# Patient Record
Sex: FEMALE | Race: WHITE | NOT HISPANIC OR LATINO
[De-identification: names, ages, dates, MRNs, and addresses within clinical notes are randomized per-mention and may not be internally consistent; named-entity substitution may affect disease eponyms.]

---

## 2017-07-12 ENCOUNTER — APPOINTMENT (OUTPATIENT)
Dept: CARDIOLOGY | Facility: CLINIC | Age: 52
End: 2017-07-12

## 2017-07-12 VITALS
BODY MASS INDEX: 47.09 KG/M2 | DIASTOLIC BLOOD PRESSURE: 84 MMHG | HEIGHT: 66 IN | HEART RATE: 74 BPM | SYSTOLIC BLOOD PRESSURE: 146 MMHG | OXYGEN SATURATION: 96 % | WEIGHT: 293 LBS

## 2017-07-12 DIAGNOSIS — F17.200 NICOTINE DEPENDENCE, UNSPECIFIED, UNCOMPLICATED: ICD-10-CM

## 2017-07-12 DIAGNOSIS — Z87.898 PERSONAL HISTORY OF OTHER SPECIFIED CONDITIONS: ICD-10-CM

## 2017-08-04 ENCOUNTER — APPOINTMENT (OUTPATIENT)
Dept: CARDIOLOGY | Facility: CLINIC | Age: 52
End: 2017-08-04
Payer: COMMERCIAL

## 2017-08-04 PROCEDURE — A9502: CPT

## 2017-08-04 PROCEDURE — 78452 HT MUSCLE IMAGE SPECT MULT: CPT

## 2017-08-04 PROCEDURE — 93015 CV STRESS TEST SUPVJ I&R: CPT

## 2017-08-04 PROCEDURE — 93306 TTE W/DOPPLER COMPLETE: CPT

## 2017-08-09 ENCOUNTER — APPOINTMENT (OUTPATIENT)
Dept: CARDIOLOGY | Facility: CLINIC | Age: 52
End: 2017-08-09
Payer: COMMERCIAL

## 2017-08-09 VITALS
OXYGEN SATURATION: 97 % | DIASTOLIC BLOOD PRESSURE: 88 MMHG | HEART RATE: 80 BPM | HEIGHT: 66 IN | SYSTOLIC BLOOD PRESSURE: 140 MMHG | BODY MASS INDEX: 47.09 KG/M2 | WEIGHT: 293 LBS

## 2017-08-09 PROCEDURE — 99214 OFFICE O/P EST MOD 30 MIN: CPT

## 2017-08-21 ENCOUNTER — APPOINTMENT (OUTPATIENT)
Dept: ULTRASOUND IMAGING | Facility: CLINIC | Age: 52
End: 2017-08-21

## 2017-08-21 ENCOUNTER — OUTPATIENT (OUTPATIENT)
Dept: OUTPATIENT SERVICES | Facility: HOSPITAL | Age: 52
LOS: 1 days | End: 2017-08-21
Payer: COMMERCIAL

## 2017-08-21 DIAGNOSIS — Z00.8 ENCOUNTER FOR OTHER GENERAL EXAMINATION: ICD-10-CM

## 2017-08-21 PROCEDURE — 76700 US EXAM ABDOM COMPLETE: CPT

## 2017-08-22 PROCEDURE — 76700 US EXAM ABDOM COMPLETE: CPT | Mod: 26

## 2017-09-09 ENCOUNTER — APPOINTMENT (OUTPATIENT)
Dept: ORTHOPEDIC SURGERY | Facility: CLINIC | Age: 52
End: 2017-09-09
Payer: COMMERCIAL

## 2017-09-09 VITALS
WEIGHT: 293 LBS | BODY MASS INDEX: 48.82 KG/M2 | HEIGHT: 65 IN | SYSTOLIC BLOOD PRESSURE: 122 MMHG | DIASTOLIC BLOOD PRESSURE: 76 MMHG | HEART RATE: 82 BPM

## 2017-09-09 DIAGNOSIS — Z87.39 PERSONAL HISTORY OF OTHER DISEASES OF THE MUSCULOSKELETAL SYSTEM AND CONNECTIVE TISSUE: ICD-10-CM

## 2017-09-09 DIAGNOSIS — M51.26 OTHER INTERVERTEBRAL DISC DISPLACEMENT, LUMBAR REGION: ICD-10-CM

## 2017-09-09 DIAGNOSIS — Z86.69 PERSONAL HISTORY OF OTHER DISEASES OF THE NERVOUS SYSTEM AND SENSE ORGANS: ICD-10-CM

## 2017-09-09 PROCEDURE — 73502 X-RAY EXAM HIP UNI 2-3 VIEWS: CPT

## 2017-09-09 PROCEDURE — 99203 OFFICE O/P NEW LOW 30 MIN: CPT

## 2017-09-09 RX ORDER — DICLOFENAC SODIUM 75 MG/1
75 TABLET, DELAYED RELEASE ORAL
Qty: 60 | Refills: 0 | Status: DISCONTINUED | COMMUNITY
Start: 2017-04-05 | End: 2017-09-09

## 2017-09-09 RX ORDER — HYDROCODONE BITARTRATE AND ACETAMINOPHEN 5; 300 MG/1; MG/1
5-300 TABLET ORAL
Qty: 15 | Refills: 0 | Status: DISCONTINUED | COMMUNITY
Start: 2017-07-06 | End: 2017-09-09

## 2017-09-09 RX ORDER — DIAZEPAM 5 MG/1
5 TABLET ORAL
Qty: 1 | Refills: 0 | Status: DISCONTINUED | COMMUNITY
Start: 2017-05-23 | End: 2017-09-09

## 2017-09-09 RX ORDER — TRAMADOL HYDROCHLORIDE 50 MG/1
50 TABLET, COATED ORAL
Qty: 60 | Refills: 0 | Status: DISCONTINUED | COMMUNITY
Start: 2017-03-06 | End: 2017-09-09

## 2017-09-20 PROBLEM — Z00.00 ENCOUNTER FOR PREVENTIVE HEALTH EXAMINATION: Noted: 2017-09-20

## 2017-10-16 ENCOUNTER — APPOINTMENT (OUTPATIENT)
Age: 52
End: 2017-10-16
Payer: COMMERCIAL

## 2017-10-16 ENCOUNTER — NON-APPOINTMENT (OUTPATIENT)
Age: 52
End: 2017-10-16

## 2017-10-16 ENCOUNTER — APPOINTMENT (OUTPATIENT)
Age: 52
End: 2017-10-16

## 2017-10-16 VITALS
HEART RATE: 86 BPM | HEIGHT: 65 IN | OXYGEN SATURATION: 96 % | DIASTOLIC BLOOD PRESSURE: 90 MMHG | SYSTOLIC BLOOD PRESSURE: 130 MMHG | BODY MASS INDEX: 48.82 KG/M2 | WEIGHT: 293 LBS

## 2017-10-16 VITALS
HEART RATE: 82 BPM | HEIGHT: 65 IN | SYSTOLIC BLOOD PRESSURE: 130 MMHG | TEMPERATURE: 98.2 F | DIASTOLIC BLOOD PRESSURE: 82 MMHG | BODY MASS INDEX: 48.82 KG/M2 | OXYGEN SATURATION: 97 % | RESPIRATION RATE: 16 BRPM | WEIGHT: 293 LBS

## 2017-10-16 PROCEDURE — 93000 ELECTROCARDIOGRAM COMPLETE: CPT

## 2017-10-16 PROCEDURE — 99214 OFFICE O/P EST MOD 30 MIN: CPT

## 2017-11-01 ENCOUNTER — OUTPATIENT (OUTPATIENT)
Dept: OUTPATIENT SERVICES | Facility: HOSPITAL | Age: 52
LOS: 1 days | End: 2017-11-01
Payer: COMMERCIAL

## 2017-11-01 VITALS — WEIGHT: 293 LBS | HEIGHT: 65.75 IN

## 2017-11-01 DIAGNOSIS — Z98.890 OTHER SPECIFIED POSTPROCEDURAL STATES: Chronic | ICD-10-CM

## 2017-11-01 DIAGNOSIS — Z01.818 ENCOUNTER FOR OTHER PREPROCEDURAL EXAMINATION: ICD-10-CM

## 2017-11-01 DIAGNOSIS — M19.90 UNSPECIFIED OSTEOARTHRITIS, UNSPECIFIED SITE: ICD-10-CM

## 2017-11-01 DIAGNOSIS — M16.11 UNILATERAL PRIMARY OSTEOARTHRITIS, RIGHT HIP: ICD-10-CM

## 2017-11-01 DIAGNOSIS — Z90.89 ACQUIRED ABSENCE OF OTHER ORGANS: Chronic | ICD-10-CM

## 2017-11-01 LAB
ALBUMIN SERPL ELPH-MCNC: 3.6 G/DL — SIGNIFICANT CHANGE UP (ref 3.3–5)
ALP SERPL-CCNC: 107 U/L — SIGNIFICANT CHANGE UP (ref 30–120)
ALT FLD-CCNC: 22 U/L DA — SIGNIFICANT CHANGE UP (ref 10–60)
ANION GAP SERPL CALC-SCNC: 7 MMOL/L — SIGNIFICANT CHANGE UP (ref 5–17)
APTT BLD: 32 SEC — SIGNIFICANT CHANGE UP (ref 27.5–37.4)
AST SERPL-CCNC: 20 U/L — SIGNIFICANT CHANGE UP (ref 10–40)
BILIRUB SERPL-MCNC: 0.3 MG/DL — SIGNIFICANT CHANGE UP (ref 0.2–1.2)
BLD GP AB SCN SERPL QL: SIGNIFICANT CHANGE UP
BUN SERPL-MCNC: 22 MG/DL — SIGNIFICANT CHANGE UP (ref 7–23)
CALCIUM SERPL-MCNC: 9.2 MG/DL — SIGNIFICANT CHANGE UP (ref 8.4–10.5)
CHLORIDE SERPL-SCNC: 105 MMOL/L — SIGNIFICANT CHANGE UP (ref 96–108)
CO2 SERPL-SCNC: 29 MMOL/L — SIGNIFICANT CHANGE UP (ref 22–31)
CREAT SERPL-MCNC: 0.88 MG/DL — SIGNIFICANT CHANGE UP (ref 0.5–1.3)
GLUCOSE SERPL-MCNC: 97 MG/DL — SIGNIFICANT CHANGE UP (ref 70–99)
HCT VFR BLD CALC: 48.2 % — HIGH (ref 34.5–45)
HGB BLD-MCNC: 15.1 G/DL — SIGNIFICANT CHANGE UP (ref 11.5–15.5)
INR BLD: 0.96 RATIO — SIGNIFICANT CHANGE UP (ref 0.88–1.16)
MCHC RBC-ENTMCNC: 30.6 PG — SIGNIFICANT CHANGE UP (ref 27–34)
MCHC RBC-ENTMCNC: 31.3 GM/DL — LOW (ref 32–36)
MCV RBC AUTO: 97.8 FL — SIGNIFICANT CHANGE UP (ref 80–100)
MRSA PCR RESULT.: SIGNIFICANT CHANGE UP
PLATELET # BLD AUTO: 282 K/UL — SIGNIFICANT CHANGE UP (ref 150–400)
POTASSIUM SERPL-MCNC: 4.4 MMOL/L — SIGNIFICANT CHANGE UP (ref 3.5–5.3)
POTASSIUM SERPL-SCNC: 4.4 MMOL/L — SIGNIFICANT CHANGE UP (ref 3.5–5.3)
PROT SERPL-MCNC: 7.6 G/DL — SIGNIFICANT CHANGE UP (ref 6–8.3)
PROTHROM AB SERPL-ACNC: 10.4 SEC — SIGNIFICANT CHANGE UP (ref 9.8–12.7)
RBC # BLD: 4.93 M/UL — SIGNIFICANT CHANGE UP (ref 3.8–5.2)
RBC # FLD: 12.9 % — SIGNIFICANT CHANGE UP (ref 10.3–14.5)
S AUREUS DNA NOSE QL NAA+PROBE: SIGNIFICANT CHANGE UP
SODIUM SERPL-SCNC: 141 MMOL/L — SIGNIFICANT CHANGE UP (ref 135–145)
WBC # BLD: 10.9 K/UL — HIGH (ref 3.8–10.5)
WBC # FLD AUTO: 10.9 K/UL — HIGH (ref 3.8–10.5)

## 2017-11-01 PROCEDURE — G0463: CPT

## 2017-11-01 PROCEDURE — 85027 COMPLETE CBC AUTOMATED: CPT

## 2017-11-01 PROCEDURE — 85610 PROTHROMBIN TIME: CPT

## 2017-11-01 PROCEDURE — 87641 MR-STAPH DNA AMP PROBE: CPT

## 2017-11-01 PROCEDURE — 86900 BLOOD TYPING SEROLOGIC ABO: CPT

## 2017-11-01 PROCEDURE — 93010 ELECTROCARDIOGRAM REPORT: CPT | Mod: NC

## 2017-11-01 PROCEDURE — 86850 RBC ANTIBODY SCREEN: CPT

## 2017-11-01 PROCEDURE — 85730 THROMBOPLASTIN TIME PARTIAL: CPT

## 2017-11-01 PROCEDURE — 93005 ELECTROCARDIOGRAM TRACING: CPT

## 2017-11-01 PROCEDURE — 80053 COMPREHEN METABOLIC PANEL: CPT

## 2017-11-01 PROCEDURE — 87640 STAPH A DNA AMP PROBE: CPT

## 2017-11-01 PROCEDURE — 86901 BLOOD TYPING SEROLOGIC RH(D): CPT

## 2017-11-01 NOTE — H&P PST ADULT - NSANTHOSAYNRD_GEN_A_CORE
No. SYLWIA screening performed.  STOP BANG Legend: 0-2 = LOW Risk; 3-4 = INTERMEDIATE Risk; 5-8 = HIGH Risk

## 2017-11-01 NOTE — H&P PST ADULT - PMH
Anxiety    Cervical disc herniation  unsure level  Goiter    Hypothyroid    Lumbar disc herniation  L4-5, treated with epidural injections with Dr. Stephenson. Last done in 2016  Mononucleosis  age 10  Morbid obesity    Neuropathy  bilateral lower extremities  Osteoarthritis    Sedentary lifestyle    Tendon laceration  right lower leg 25 years ago  URI (upper respiratory infection)  started at [facility] on 10/30/17

## 2017-11-01 NOTE — H&P PST ADULT - MUSCULOSKELETAL
details… detailed exam diminished strength/right hip/no calf tenderness/decreased ROM/no joint swelling/decreased ROM due to pain/no joint warmth/no joint erythema

## 2017-11-01 NOTE — H&P PST ADULT - HISTORY OF PRESENT ILLNESS
52 yo female presents with 2 year history of right hip., groin and buttocks pain with radiation to the knee. Pain worsened in the last year. Pain 9-10/10 constant in nature whether at rest or with activity. Pain mildly relieved with motrin. Takes lyrica at bedtime for neuropathy and gets relief to be able to sleep.

## 2017-11-01 NOTE — H&P PST ADULT - PSH
S/P laminectomy  T11-12, 2010  S/P T&A (status post tonsillectomy and adenoidectomy)  age 19  S/P tendon repair  right lower leg 25 years ago

## 2017-11-01 NOTE — H&P PST ADULT - PROBLEM SELECTOR PLAN 1
right THR, medical and cardiac clearances requested. synthroid AM of surgery with sips of water and may take xanax if needed. pepcid and surgical wash instructions reviewed and verbalized understanding. Patient stated that she is unable to attend the joint replacement class. Seen in PST by Sonia Guerrero.. Pharmacy consult done by Dr. Green.

## 2017-11-01 NOTE — H&P PST ADULT - RS GEN PE MLT RESP DETAILS PC
no wheezes/airway patent/breath sounds equal/no rales/good air movement/respirations non-labored/no rhonchi/clear to auscultation bilaterally

## 2017-11-10 NOTE — PROGRESS NOTE ADULT - SUBJECTIVE AND OBJECTIVE BOX
Admission medication reconciliation/Presurgical evaluation:  Allergies:  Topical steroid: skin rash from the ointment    Home Medications:   · 	Levothyroxine (Synthroid®) 175 mcg once a day  · 	Pregabalin (Lyrica®) 75 mg once a day (at bedtime)  · 	Nortriptyline 10 mg once a day (at bedtime)  · 	Alprazolam (Xanax®) 1 mg once a day, As Needed - for anxiety  · 	Zolpidem 5 mg once a day (at bedtime), As Needed - for insomnia  · 	Vitamin B-100 once a day  · 	Emergen-C: 2 tabs once a day  · 	Fluticasone-Salmeterol (Advair Diskus®) 250 mcg-50 mcg inhalation powder 2 puffs 2 times a day  · 	Azithromycin 5 Day Dose Pack 250 mg   · 	Benzonatate (Tessalon®) 200 mg 3 times a day  · 	Ibuprofen (Motrin®) 600 mg every 6 hours      Past Medical History:  Anxiety    Cervical disc herniation  unsure level  Current smoker (approx. 1/3 ppd)  Goiter    Hypothyroid    Lumbar disc herniation  L4-5, treated with epidural injections with Dr. Stephenson. Last done in 2016  Mononucleosis  age 10  Neuropathy  bilateral lower extremities  Osteoarthritis    Sedentary lifestyle    URI (upper respiratory infection)  started [facility] on 10/30/17    Past Surgical History:  S/P laminectomy  T11-12, 2010  S/P tendon repair  right lower leg 25 years ago    PST values of interest:  BMI > 50 (morbid obesity)  WBC 10.9 (­)  SCr 0.88 mg/dL  QTc 441 (WNL)  LFTs WNL    Recommendations:  1.	Repeat CBC (WBC) preop to confirm resolution of recent URI  2.	IV TXA  3.	Acetaminophen and celecoxib for multimodal pain management  4.	VTE prophylaxis: Eliquis 2.5mg q12h x 35 days (BMI > 40, per current protocol)  5.	Current smoker: offer patient nicotine patch (14mg or 7mg, depending on true smoking patterns) and gum/lozenge/inhaler for breakthrough cravings  6.	Note that patient takes 3 medications at bedtime to facilitate sleep: Lyrica 75mg, Nortriptyline 10mg, Zolpidem 5mg)

## 2017-11-14 RX ORDER — ONDANSETRON 8 MG/1
4 TABLET, FILM COATED ORAL EVERY 6 HOURS
Qty: 0 | Refills: 0 | Status: DISCONTINUED | OUTPATIENT
Start: 2017-11-15 | End: 2017-11-17

## 2017-11-14 RX ORDER — DOCUSATE SODIUM 100 MG
100 CAPSULE ORAL THREE TIMES A DAY
Qty: 0 | Refills: 0 | Status: DISCONTINUED | OUTPATIENT
Start: 2017-11-15 | End: 2017-11-17

## 2017-11-14 RX ORDER — SENNA PLUS 8.6 MG/1
2 TABLET ORAL AT BEDTIME
Qty: 0 | Refills: 0 | Status: DISCONTINUED | OUTPATIENT
Start: 2017-11-15 | End: 2017-11-17

## 2017-11-14 RX ORDER — POLYETHYLENE GLYCOL 3350 17 G/17G
17 POWDER, FOR SOLUTION ORAL DAILY
Qty: 0 | Refills: 0 | Status: DISCONTINUED | OUTPATIENT
Start: 2017-11-15 | End: 2017-11-17

## 2017-11-14 RX ORDER — MAGNESIUM HYDROXIDE 400 MG/1
30 TABLET, CHEWABLE ORAL DAILY
Qty: 0 | Refills: 0 | Status: DISCONTINUED | OUTPATIENT
Start: 2017-11-15 | End: 2017-11-17

## 2017-11-14 RX ORDER — SODIUM CHLORIDE 9 MG/ML
1000 INJECTION, SOLUTION INTRAVENOUS
Qty: 0 | Refills: 0 | Status: DISCONTINUED | OUTPATIENT
Start: 2017-11-15 | End: 2017-11-17

## 2017-11-15 ENCOUNTER — INPATIENT (INPATIENT)
Facility: HOSPITAL | Age: 52
LOS: 1 days | Discharge: NURSING, REHAB OR LONG-TERM CARE | DRG: 470 | End: 2017-11-17
Attending: ORTHOPAEDIC SURGERY | Admitting: ORTHOPAEDIC SURGERY
Payer: COMMERCIAL

## 2017-11-15 ENCOUNTER — APPOINTMENT (OUTPATIENT)
Dept: ORTHOPEDIC SURGERY | Facility: HOSPITAL | Age: 52
End: 2017-11-15

## 2017-11-15 ENCOUNTER — RESULT REVIEW (OUTPATIENT)
Age: 52
End: 2017-11-15

## 2017-11-15 VITALS
DIASTOLIC BLOOD PRESSURE: 82 MMHG | HEIGHT: 66 IN | RESPIRATION RATE: 24 BRPM | HEART RATE: 98 BPM | SYSTOLIC BLOOD PRESSURE: 145 MMHG | OXYGEN SATURATION: 99 % | TEMPERATURE: 98 F | WEIGHT: 293 LBS

## 2017-11-15 DIAGNOSIS — Z98.890 OTHER SPECIFIED POSTPROCEDURAL STATES: Chronic | ICD-10-CM

## 2017-11-15 DIAGNOSIS — M54.16 RADICULOPATHY, LUMBAR REGION: ICD-10-CM

## 2017-11-15 DIAGNOSIS — M16.11 UNILATERAL PRIMARY OSTEOARTHRITIS, RIGHT HIP: ICD-10-CM

## 2017-11-15 DIAGNOSIS — Z90.89 ACQUIRED ABSENCE OF OTHER ORGANS: Chronic | ICD-10-CM

## 2017-11-15 LAB
ANION GAP SERPL CALC-SCNC: 10 MMOL/L — SIGNIFICANT CHANGE UP (ref 5–17)
BUN SERPL-MCNC: 20 MG/DL — SIGNIFICANT CHANGE UP (ref 7–23)
CALCIUM SERPL-MCNC: 8.6 MG/DL — SIGNIFICANT CHANGE UP (ref 8.4–10.5)
CHLORIDE SERPL-SCNC: 102 MMOL/L — SIGNIFICANT CHANGE UP (ref 96–108)
CO2 SERPL-SCNC: 24 MMOL/L — SIGNIFICANT CHANGE UP (ref 22–31)
CREAT SERPL-MCNC: 1.02 MG/DL — SIGNIFICANT CHANGE UP (ref 0.5–1.3)
GLUCOSE SERPL-MCNC: 169 MG/DL — HIGH (ref 70–99)
HCG UR QL: NEGATIVE — SIGNIFICANT CHANGE UP
HCT VFR BLD CALC: 39.5 % — SIGNIFICANT CHANGE UP (ref 34.5–45)
HCT VFR BLD CALC: 45.8 % — HIGH (ref 34.5–45)
HGB BLD-MCNC: 13 G/DL — SIGNIFICANT CHANGE UP (ref 11.5–15.5)
HGB BLD-MCNC: 14.8 G/DL — SIGNIFICANT CHANGE UP (ref 11.5–15.5)
MCHC RBC-ENTMCNC: 30.7 PG — SIGNIFICANT CHANGE UP (ref 27–34)
MCHC RBC-ENTMCNC: 32.2 GM/DL — SIGNIFICANT CHANGE UP (ref 32–36)
MCV RBC AUTO: 95.3 FL — SIGNIFICANT CHANGE UP (ref 80–100)
PLATELET # BLD AUTO: 316 K/UL — SIGNIFICANT CHANGE UP (ref 150–400)
POTASSIUM SERPL-MCNC: 4.4 MMOL/L — SIGNIFICANT CHANGE UP (ref 3.5–5.3)
POTASSIUM SERPL-SCNC: 4.4 MMOL/L — SIGNIFICANT CHANGE UP (ref 3.5–5.3)
RBC # BLD: 4.81 M/UL — SIGNIFICANT CHANGE UP (ref 3.8–5.2)
RBC # FLD: 12.8 % — SIGNIFICANT CHANGE UP (ref 10.3–14.5)
SODIUM SERPL-SCNC: 136 MMOL/L — SIGNIFICANT CHANGE UP (ref 135–145)
WBC # BLD: 10.6 K/UL — HIGH (ref 3.8–10.5)
WBC # FLD AUTO: 10.6 K/UL — HIGH (ref 3.8–10.5)

## 2017-11-15 PROCEDURE — 88305 TISSUE EXAM BY PATHOLOGIST: CPT | Mod: 26

## 2017-11-15 PROCEDURE — 73502 X-RAY EXAM HIP UNI 2-3 VIEWS: CPT | Mod: 26,RT

## 2017-11-15 PROCEDURE — 99223 1ST HOSP IP/OBS HIGH 75: CPT

## 2017-11-15 PROCEDURE — 27130 TOTAL HIP ARTHROPLASTY: CPT | Mod: RT

## 2017-11-15 PROCEDURE — 88311 DECALCIFY TISSUE: CPT | Mod: 26

## 2017-11-15 PROCEDURE — 27130 TOTAL HIP ARTHROPLASTY: CPT | Mod: 82,RT

## 2017-11-15 RX ORDER — INFLUENZA VIRUS VACCINE 15; 15; 15; 15 UG/.5ML; UG/.5ML; UG/.5ML; UG/.5ML
0.5 SUSPENSION INTRAMUSCULAR ONCE
Qty: 0 | Refills: 0 | Status: DISCONTINUED | OUTPATIENT
Start: 2017-11-15 | End: 2017-11-17

## 2017-11-15 RX ORDER — OXYCODONE HYDROCHLORIDE 5 MG/1
10 TABLET ORAL
Qty: 0 | Refills: 0 | Status: DISCONTINUED | OUTPATIENT
Start: 2017-11-15 | End: 2017-11-16

## 2017-11-15 RX ORDER — ALPRAZOLAM 0.25 MG
1 TABLET ORAL DAILY
Qty: 0 | Refills: 0 | Status: DISCONTINUED | OUTPATIENT
Start: 2017-11-15 | End: 2017-11-16

## 2017-11-15 RX ORDER — ACETAMINOPHEN 500 MG
1000 TABLET ORAL EVERY 6 HOURS
Qty: 0 | Refills: 0 | Status: COMPLETED | OUTPATIENT
Start: 2017-11-15 | End: 2017-11-16

## 2017-11-15 RX ORDER — PANTOPRAZOLE SODIUM 20 MG/1
40 TABLET, DELAYED RELEASE ORAL
Qty: 0 | Refills: 0 | Status: DISCONTINUED | OUTPATIENT
Start: 2017-11-15 | End: 2017-11-17

## 2017-11-15 RX ORDER — HYDROMORPHONE HYDROCHLORIDE 2 MG/ML
0.5 INJECTION INTRAMUSCULAR; INTRAVENOUS; SUBCUTANEOUS
Qty: 0 | Refills: 0 | Status: DISCONTINUED | OUTPATIENT
Start: 2017-11-15 | End: 2017-11-15

## 2017-11-15 RX ORDER — HYDROMORPHONE HYDROCHLORIDE 2 MG/ML
0.5 INJECTION INTRAMUSCULAR; INTRAVENOUS; SUBCUTANEOUS ONCE
Qty: 0 | Refills: 0 | Status: DISCONTINUED | OUTPATIENT
Start: 2017-11-15 | End: 2017-11-15

## 2017-11-15 RX ORDER — LEVOTHYROXINE SODIUM 125 MCG
175 TABLET ORAL DAILY
Qty: 0 | Refills: 0 | Status: DISCONTINUED | OUTPATIENT
Start: 2017-11-15 | End: 2017-11-17

## 2017-11-15 RX ORDER — SODIUM CHLORIDE 9 MG/ML
1000 INJECTION, SOLUTION INTRAVENOUS
Qty: 0 | Refills: 0 | Status: DISCONTINUED | OUTPATIENT
Start: 2017-11-15 | End: 2017-11-15

## 2017-11-15 RX ORDER — BUDESONIDE AND FORMOTEROL FUMARATE DIHYDRATE 160; 4.5 UG/1; UG/1
2 AEROSOL RESPIRATORY (INHALATION)
Qty: 0 | Refills: 0 | Status: DISCONTINUED | OUTPATIENT
Start: 2017-11-15 | End: 2017-11-16

## 2017-11-15 RX ORDER — ACETAMINOPHEN 500 MG
1000 TABLET ORAL ONCE
Qty: 0 | Refills: 0 | Status: COMPLETED | OUTPATIENT
Start: 2017-11-15 | End: 2017-11-15

## 2017-11-15 RX ORDER — ONDANSETRON 8 MG/1
4 TABLET, FILM COATED ORAL ONCE
Qty: 0 | Refills: 0 | Status: DISCONTINUED | OUTPATIENT
Start: 2017-11-15 | End: 2017-11-15

## 2017-11-15 RX ORDER — CEFAZOLIN SODIUM 1 G
3000 VIAL (EA) INJECTION EVERY 8 HOURS
Qty: 0 | Refills: 0 | Status: COMPLETED | OUTPATIENT
Start: 2017-11-15 | End: 2017-11-16

## 2017-11-15 RX ORDER — CELECOXIB 200 MG/1
200 CAPSULE ORAL
Qty: 0 | Refills: 0 | Status: DISCONTINUED | OUTPATIENT
Start: 2017-11-15 | End: 2017-11-17

## 2017-11-15 RX ORDER — CELECOXIB 200 MG/1
200 CAPSULE ORAL ONCE
Qty: 0 | Refills: 0 | Status: COMPLETED | OUTPATIENT
Start: 2017-11-15 | End: 2017-11-15

## 2017-11-15 RX ORDER — OXYCODONE HYDROCHLORIDE 5 MG/1
5 TABLET ORAL
Qty: 0 | Refills: 0 | Status: DISCONTINUED | OUTPATIENT
Start: 2017-11-15 | End: 2017-11-16

## 2017-11-15 RX ORDER — APIXABAN 2.5 MG/1
2.5 TABLET, FILM COATED ORAL EVERY 12 HOURS
Qty: 0 | Refills: 0 | Status: DISCONTINUED | OUTPATIENT
Start: 2017-11-16 | End: 2017-11-17

## 2017-11-15 RX ORDER — TRANEXAMIC ACID 100 MG/ML
1000 INJECTION, SOLUTION INTRAVENOUS ONCE
Qty: 0 | Refills: 0 | Status: COMPLETED | OUTPATIENT
Start: 2017-11-15 | End: 2017-11-15

## 2017-11-15 RX ORDER — ACETAMINOPHEN 500 MG
1000 TABLET ORAL EVERY 8 HOURS
Qty: 0 | Refills: 0 | Status: DISCONTINUED | OUTPATIENT
Start: 2017-11-16 | End: 2017-11-17

## 2017-11-15 RX ORDER — APREPITANT 80 MG/1
40 CAPSULE ORAL ONCE
Qty: 0 | Refills: 0 | Status: COMPLETED | OUTPATIENT
Start: 2017-11-15 | End: 2017-11-15

## 2017-11-15 RX ORDER — NORTRIPTYLINE HYDROCHLORIDE 10 MG/1
10 CAPSULE ORAL AT BEDTIME
Qty: 0 | Refills: 0 | Status: DISCONTINUED | OUTPATIENT
Start: 2017-11-15 | End: 2017-11-17

## 2017-11-15 RX ORDER — HYDROMORPHONE HYDROCHLORIDE 2 MG/ML
0.5 INJECTION INTRAMUSCULAR; INTRAVENOUS; SUBCUTANEOUS
Qty: 0 | Refills: 0 | Status: DISCONTINUED | OUTPATIENT
Start: 2017-11-15 | End: 2017-11-17

## 2017-11-15 RX ORDER — CEFAZOLIN SODIUM 1 G
3000 VIAL (EA) INJECTION ONCE
Qty: 0 | Refills: 0 | Status: COMPLETED | OUTPATIENT
Start: 2017-11-15 | End: 2017-11-15

## 2017-11-15 RX ADMIN — HYDROMORPHONE HYDROCHLORIDE 0.5 MILLIGRAM(S): 2 INJECTION INTRAMUSCULAR; INTRAVENOUS; SUBCUTANEOUS at 11:23

## 2017-11-15 RX ADMIN — HYDROMORPHONE HYDROCHLORIDE 0.5 MILLIGRAM(S): 2 INJECTION INTRAMUSCULAR; INTRAVENOUS; SUBCUTANEOUS at 13:43

## 2017-11-15 RX ADMIN — HYDROMORPHONE HYDROCHLORIDE 0.5 MILLIGRAM(S): 2 INJECTION INTRAMUSCULAR; INTRAVENOUS; SUBCUTANEOUS at 16:37

## 2017-11-15 RX ADMIN — CELECOXIB 200 MILLIGRAM(S): 200 CAPSULE ORAL at 18:12

## 2017-11-15 RX ADMIN — Medication 100 MILLIGRAM(S): at 16:37

## 2017-11-15 RX ADMIN — Medication 400 MILLIGRAM(S): at 22:34

## 2017-11-15 RX ADMIN — HYDROMORPHONE HYDROCHLORIDE 0.5 MILLIGRAM(S): 2 INJECTION INTRAMUSCULAR; INTRAVENOUS; SUBCUTANEOUS at 11:12

## 2017-11-15 RX ADMIN — OXYCODONE HYDROCHLORIDE 10 MILLIGRAM(S): 5 TABLET ORAL at 21:44

## 2017-11-15 RX ADMIN — NORTRIPTYLINE HYDROCHLORIDE 10 MILLIGRAM(S): 10 CAPSULE ORAL at 21:14

## 2017-11-15 RX ADMIN — HYDROMORPHONE HYDROCHLORIDE 0.5 MILLIGRAM(S): 2 INJECTION INTRAMUSCULAR; INTRAVENOUS; SUBCUTANEOUS at 19:30

## 2017-11-15 RX ADMIN — HYDROMORPHONE HYDROCHLORIDE 0.5 MILLIGRAM(S): 2 INJECTION INTRAMUSCULAR; INTRAVENOUS; SUBCUTANEOUS at 16:50

## 2017-11-15 RX ADMIN — Medication 1 MILLIGRAM(S): at 14:00

## 2017-11-15 RX ADMIN — Medication 1000 MILLIGRAM(S): at 16:34

## 2017-11-15 RX ADMIN — HYDROMORPHONE HYDROCHLORIDE 0.5 MILLIGRAM(S): 2 INJECTION INTRAMUSCULAR; INTRAVENOUS; SUBCUTANEOUS at 13:29

## 2017-11-15 RX ADMIN — APREPITANT 40 MILLIGRAM(S): 80 CAPSULE ORAL at 06:34

## 2017-11-15 RX ADMIN — HYDROMORPHONE HYDROCHLORIDE 0.5 MILLIGRAM(S): 2 INJECTION INTRAMUSCULAR; INTRAVENOUS; SUBCUTANEOUS at 11:52

## 2017-11-15 RX ADMIN — HYDROMORPHONE HYDROCHLORIDE 0.5 MILLIGRAM(S): 2 INJECTION INTRAMUSCULAR; INTRAVENOUS; SUBCUTANEOUS at 22:34

## 2017-11-15 RX ADMIN — HYDROMORPHONE HYDROCHLORIDE 0.5 MILLIGRAM(S): 2 INJECTION INTRAMUSCULAR; INTRAVENOUS; SUBCUTANEOUS at 23:04

## 2017-11-15 RX ADMIN — Medication 75 MILLIGRAM(S): at 21:14

## 2017-11-15 RX ADMIN — OXYCODONE HYDROCHLORIDE 10 MILLIGRAM(S): 5 TABLET ORAL at 21:14

## 2017-11-15 RX ADMIN — CELECOXIB 200 MILLIGRAM(S): 200 CAPSULE ORAL at 18:30

## 2017-11-15 RX ADMIN — SODIUM CHLORIDE 100 MILLILITER(S): 9 INJECTION, SOLUTION INTRAVENOUS at 10:43

## 2017-11-15 RX ADMIN — HYDROMORPHONE HYDROCHLORIDE 0.5 MILLIGRAM(S): 2 INJECTION INTRAMUSCULAR; INTRAVENOUS; SUBCUTANEOUS at 12:10

## 2017-11-15 RX ADMIN — HYDROMORPHONE HYDROCHLORIDE 0.5 MILLIGRAM(S): 2 INJECTION INTRAMUSCULAR; INTRAVENOUS; SUBCUTANEOUS at 20:00

## 2017-11-15 RX ADMIN — Medication 400 MILLIGRAM(S): at 15:24

## 2017-11-15 RX ADMIN — CELECOXIB 200 MILLIGRAM(S): 200 CAPSULE ORAL at 06:34

## 2017-11-15 NOTE — BRIEF OPERATIVE NOTE - PROCEDURE
<<-----Click on this checkbox to enter Procedure Hip replacement  11/15/2017  Right  Active  Fabián Frankel

## 2017-11-15 NOTE — PHYSICAL THERAPY INITIAL EVALUATION ADULT - PRECAUTIONS/LIMITATIONS, REHAB EVAL
right hip precautions/surgical precautions/No flexion greater than 90 degrees; no internal rotation, no ADDUCTION past the midline

## 2017-11-15 NOTE — PHYSICAL THERAPY INITIAL EVALUATION ADULT - RANGE OF MOTION EXAMINATION, REHAB EVAL
deficits as listed below/Left LE ROM was WFL (within functional limits)/right hip 0-90 deg due to right hip precautions

## 2017-11-15 NOTE — OCCUPATIONAL THERAPY INITIAL EVALUATION ADULT - TRANSFER TRAINING, PT EVAL
Patient will transfer to toilet with DME as needed with supervision maintaining THP's within 2-3 sessions.

## 2017-11-15 NOTE — CONSULT NOTE ADULT - ASSESSMENT
POD#0 s/p RIGHT THR  - Pain control  - Bowel regimen  - PT/OT  - DVT PPx per Ortho - Eliquis    Insomnia  -     Nicotine Dependence  - POD#0 s/p RIGHT THR  - Pain control  - Bowel regimen  - PT/OT  - DVT PPx per Ortho - Eliquis    Insomnia  - continue Nortryptiline, Lyrica  - HOLD Ambien PRN    Anxiety (described as "severe" by patient)  - Xanax PRN    Nicotine Dependence  - Counseled for 4 minutes, not currently motivated to quit    Hypothyroidism  - c/w synthroid

## 2017-11-15 NOTE — PROGRESS NOTE ADULT - SUBJECTIVE AND OBJECTIVE BOX
Ortho Post Op Check    Procedure: Right posterior THR  Surgeon: Dr. Anders    Pt comfortable without complaints, pain moderately controlled-5/5; on interval pain Rx; Denies CP, SOB, N/V, numbness/tingling;   tolerated PO fluid well; minimal to moderate anxiety (from preop)  Pain Rx:  acetaminophen  IVPB. 1000 milliGRAM(s) IV Intermittent every 6 hours  ALPRAZolam 1 milliGRAM(s) Oral daily PRN  celecoxib 200 milliGRAM(s) Oral two times a day with meals  HYDROmorphone  Injectable 0.5 milliGRAM(s) IV Push every 3 hours PRN  nortriptyline 10 milliGRAM(s) Oral at bedtime  oxyCODONE    IR 5 milliGRAM(s) Oral every 3 hours PRN  oxyCODONE    IR 10 milliGRAM(s) Oral every 3 hours PRN  pregabalin 75 milliGRAM(s) Oral at bedtime    General Exam:  Vital Signs Last 24 Hrs  T(C): 36.6 (11-15-17 @ 11:58), Max: 36.6 (11-15-17 @ 11:58)  T(F): 97.8 (11-15-17 @ 11:58), Max: 97.8 (11-15-17 @ 11:58)  HR: 76 (11-15-17 @ 11:58) (74 - 84)  BP: 119/66 (11-15-17 @ 11:58) (105/69 - 124/69)  RR: 18 (11-15-17 @ 11:58) (16 - 22)  SpO2: 100% (11-15-17 @ 11:58) (99% - 100%)    General: Pt Alert and oriented, NAD, controlled moderately surgical pain.  Ext(hip): Right Hip Aquacel AG Dressing clean, dry, & intact, no soft tissue swelling thigh.  ROM Extension 0 deg.  Flexion 30 deg. PROM tolerable  Neuro/Vasc: Feet toes warm, pink. DP = 2+. No calf tenderness bilat.. Has sensation over feet & toes bilat. Full AROM bilat feet & toes. EHL =5/5  Urine Out [11P-7A] =voided; HVAC = N/A.  VTEP: On Venodynes Bilat ; Eliquis to start in AM.    A/P: 51yFemale POD#0 s/p right posterior THR  - Stable, from orthopedics stand point  - Pain Control interval Rx; Pain management RN to see patient  - DVT ppx: ordered  - Post op abx:ordered  - Hospitalist eval pending   - Weight bearing status: full  - postop labs to be checked by Medicine and Ortho

## 2017-11-15 NOTE — CONSULT NOTE ADULT - SUBJECTIVE AND OBJECTIVE BOX
PCP:  Dr. Jerome    CC:  Patient is a 51y old  Female who presents with a chief complaint of "right groin, hip and buttocks" (14 Nov 2017 16:28)    HPI:  52yo F admitted s/p right TKR today.  Has had progressively worsening right hip, groin, and buttock pain over last 2 years.  Worsened over the past year and starting to affect her quality of life.  Pain 9/10 consistently, mild relief with NSAIDS.    Anesthesia:    Baseline functional status:    REVIEW OF SYSTEMS:  CONSTITUTIONAL: No fever, weight loss, or fatigue  EYES: No eye pain, visual disturbances, or discharge  ENMT:  No difficulty hearing, tinnitus, vertigo; No sinus or throat pain  NECK: No pain or stiffness  BREASTS: No pain, masses, or nipple discharge  RESPIRATORY: No cough, wheezing, chills or hemoptysis; No shortness of breath  CARDIOVASCULAR: No chest pain, palpitations, dizziness, or leg swelling  GASTROINTESTINAL: No abdominal or epigastric pain. No nausea, vomiting, or hematemesis; No diarrhea or constipation. No melena or hematochezia.  GENITOURINARY: No dysuria, frequency, hematuria, or incontinence  NEUROLOGICAL: No headaches, memory loss, loss of strength, numbness, or tremors  SKIN: No itching, burning, rashes, or lesions   LYMPH NODES: No enlarged glands  ENDOCRINE: No heat or cold intolerance; No hair loss  MUSCULOSKELETAL: No muscle or back pain  PSYCHIATRIC: No depression, anxiety, mood swings, or difficulty sleeping  HEME/LYMPH: No easy bruising, or bleeding gums  ALLERGY AND IMMUNOLOGIC: No hives or eczema    PAST MEDICAL & SURGICAL HISTORY:  Sedentary lifestyle  Mononucleosis: age 10  Anxiety  Tendon laceration: right lower leg 25 years ago  Cervical disc herniation: unsure level  Morbid obesity  Neuropathy: bilateral lower extremities  URI (upper respiratory infection): started [facility]  Hypothyroid  Goiter  Lumbar disc herniation: L4-5, treated with epidural injections with Dr. Stephenson. Last done in 2016  Osteoarthritis  S/P tendon repair: right lower leg 25 years ago  S/P T&A (status post tonsillectomy and adenoidectomy): age 19  S/P laminectomy: T11-12, 2010    SOCIAL HISTORY:  Lives: Alone  Smoking Hx: Current smoker, 0.3 PPD x 40 years  ETOH Hx: 1-2 drinks/2-4 times a month on average  Illicit Drug Use:  None    Allergies    chlorohexadine (Rash)  topical corticosteroids (Rash)    Home Medications:  Advair Diskus 250 mcg-50 mcg inhalation powder: 2 puff(s) inhaled 2 times a day (15 Nov 2017 06:17)  Azithromycin 5 Day Dose Pack 250 mg oral tablet:  (15 Nov 2017 06:17)  benzonatate 200 mg oral capsule: 1 cap(s) orally 3 times a day (15 Nov 2017 06:17)  Emergen-C: 2 tab(s) orally once a day (15 Nov 2017 06:17)  Lyrica 75 mg oral capsule: 1 cap(s) orally once a day (at bedtime) (15 Nov 2017 06:17)  Motrin 600 mg oral tablet: 1 tab(s) orally every 6 hours (15 Nov 2017 06:17)  nortriptyline 10 mg oral capsule: 1 cap(s) orally once a day (at bedtime) (15 Nov 2017 06:17)  Synthroid 175 mcg (0.175 mg) oral tablet: 1 tab(s) orally once a day (15 Nov 2017 06:17)  Vitamin B-100 oral tablet: 1 tab(s) orally once a day (15 Nov 2017 06:17)  Xanax 1 mg oral tablet: 1 tab(s) orally once a day, As Needed - for anxiety (15 Nov 2017 06:17)  zolpidem 5 mg oral tablet: 1 tab(s) orally once a day (at bedtime), As Needed - for insomnia (15 Nov 2017 06:17)    FAMILY HISTORY:  Family history unknown: patient adopted    PHYSICAL EXAM:  Vital Signs Last 24 Hrs  T(C): 36.3 (15 Nov 2017 10:13), Max: 36.9 (15 Nov 2017 06:25)  T(F): 97.4 (15 Nov 2017 10:13), Max: 98.5 (15 Nov 2017 06:25)  HR: 74 (15 Nov 2017 10:45) (74 - 98)  BP: 108/47 (15 Nov 2017 10:45) (105/69 - 145/82)  BP(mean): --  RR: 18 (15 Nov 2017 10:45) (16 - 24)  SpO2: 100% (15 Nov 2017 10:45) (99% - 100%)    GENERAL: NAD, well-groomed, well-developed, awake, alert, oriented x 3, fluent and coherent speech  HEAD:  Atraumatic, Normocephalic  EYES: EOMI, PERRLA, conjunctiva and sclera clear  ENMT: No tonsillar erythema, exudates, or enlargement; Moist mucous membranes, Good dentition, No lesions  NECK: Supple, No JVD, No Cervical LAD, No thyromegaly, No thyroid nodules  NERVOUS SYSTEM:  Good concentration; Moving all 4 extremities;  No facial droop  CHEST/LUNG: Clear to auscultation bilaterally; No rales, rhonchi, wheezing, or rubs  HEART: Regular rate and rhythm; No murmurs, rubs, or gallops  ABDOMEN: Soft, Nontender, Nondistended, Bowel sounds present, No palpable masses or organomegaly, No bruits  EXTREMITIES:  2+ Peripheral Pulses, No clubbing, cyanosis, or edema  LYMPH: No lymphadenopathy note  SKIN: No rashes or lesions  INCISION:  Dressing clean/dry/intact    LABS:                        14.8   10.6  )-----------( 316      ( 15 Nov 2017 06:29 )             45.8      EKG:   Personally Reviewed: YES PCP:  Dr. Jerome    CC:  Patient is a 51y old  Female who presents with a chief complaint of "right groin, hip and buttocks" (14 Nov 2017 16:28)    HPI:  52yo F admitted s/p right THR today.  Has had progressively worsening right hip, groin, and buttock pain over last 2 years.  Worsened over the past year and starting to affect her quality of life.  Pain 9/10 consistently, mild relief with NSAIDS.  Had a fall in the 1990s onto right side which may have been one of the triggers for her osteoarthritis of left hip.  Gained 30lbs over last 6 months due to limited mobility (due to hip pain).    Baseline functional status:  Uses cane    REVIEW OF SYSTEMS:  CONSTITUTIONAL: No fever, weight loss, or fatigue  EYES: No eye pain, visual disturbances, or discharge  ENMT:  No difficulty hearing, tinnitus, vertigo; No sinus or throat pain  NECK: No pain or stiffness  BREASTS: No pain, masses, or nipple discharge  RESPIRATORY: No cough, wheezing, chills or hemoptysis; No shortness of breath  CARDIOVASCULAR: No chest pain, palpitations, dizziness, or leg swelling  GASTROINTESTINAL: No abdominal or epigastric pain. No nausea, vomiting, or hematemesis; No diarrhea or constipation. No melena or hematochezia.  GENITOURINARY: No dysuria, frequency, hematuria, or incontinence  NEUROLOGICAL: No headaches, memory loss, loss of strength,  or tremors, + b/l numbness of toes since back surgery  SKIN: No itching, burning, rashes, or lesions   LYMPH NODES: No enlarged glands  ENDOCRINE: No heat or cold intolerance; No hair loss  MUSCULOSKELETAL: No muscle or back pain  PSYCHIATRIC: No depression, anxiety, mood swings, or difficulty sleeping  HEME/LYMPH: No easy bruising, or bleeding gums  ALLERGY AND IMMUNOLOGIC: No hives or eczema    PAST MEDICAL & SURGICAL HISTORY:  Sedentary lifestyle  Mononucleosis: age 10  Anxiety  Tendon laceration: right lower leg 25 years ago  Cervical disc herniation: unsure level  Morbid obesity  Neuropathy: bilateral lower extremities  URI (upper respiratory infection): started [facility]17  Hypothyroid  Goiter  Lumbar disc herniation: L4-5, treated with epidural injections with Dr. Stephenson. Last done in 2016  Osteoarthritis  S/P tendon repair: right lower leg 25 years ago  S/P T&A (status post tonsillectomy and adenoidectomy): age 19  S/P laminectomy: T11-12, 2010    SOCIAL HISTORY:  Lives: Alone  Smoking Hx: Current smoker, 0.3 PPD x 40 years  ETOH Hx: 1-2 drinks/2-4 times a month on average  Illicit Drug Use:  None    Allergies    chlorohexadine (Rash)  topical corticosteroids (Rash)    Home Medications:  Advair Diskus 250 mcg-50 mcg inhalation powder: 2 puff(s) inhaled 2 times a day (15 Nov 2017 06:17)  Azithromycin 5 Day Dose Pack 250 mg oral tablet:  (15 Nov 2017 06:17)  benzonatate 200 mg oral capsule: 1 cap(s) orally 3 times a day (15 Nov 2017 06:17)  Emergen-C: 2 tab(s) orally once a day (15 Nov 2017 06:17)  Lyrica 75 mg oral capsule: 1 cap(s) orally once a day (at bedtime) (15 Nov 2017 06:17)  Motrin 600 mg oral tablet: 1 tab(s) orally every 6 hours (15 Nov 2017 06:17)  nortriptyline 10 mg oral capsule: 1 cap(s) orally once a day (at bedtime) (15 Nov 2017 06:17)  Synthroid 175 mcg (0.175 mg) oral tablet: 1 tab(s) orally once a day (15 Nov 2017 06:17)  Vitamin B-100 oral tablet: 1 tab(s) orally once a day (15 Nov 2017 06:17)  Xanax 1 mg oral tablet: 1 tab(s) orally once a day, As Needed - for anxiety (15 Nov 2017 06:17)  zolpidem 5 mg oral tablet: 1 tab(s) orally once a day (at bedtime), As Needed - for insomnia (15 Nov 2017 06:17)    FAMILY HISTORY:  Family history unknown: patient adopted    PHYSICAL EXAM:  Vital Signs Last 24 Hrs  T(C): 36.3 (15 Nov 2017 10:13), Max: 36.9 (15 Nov 2017 06:25)  T(F): 97.4 (15 Nov 2017 10:13), Max: 98.5 (15 Nov 2017 06:25)  HR: 74 (15 Nov 2017 10:45) (74 - 98)  BP: 108/47 (15 Nov 2017 10:45) (105/69 - 145/82)  BP(mean): --  RR: 18 (15 Nov 2017 10:45) (16 - 24)  SpO2: 100% (15 Nov 2017 10:45) (99% - 100%)    GENERAL: NAD, well-groomed, well-developed, awake, alert, oriented x 3, fluent and coherent speech, obese  HEAD:  Atraumatic, Normocephalic  EYES: EOMI, PERRLA, conjunctiva and sclera clear  ENMT: No tonsillar erythema, exudates, or enlargement; Moist mucous membranes, Good dentition, No lesions  NECK: Supple, No JVD, No Cervical LAD, No thyromegaly, No thyroid nodules  NERVOUS SYSTEM:  Good concentration; Moving all 4 extremities;  No facial droop  CHEST/LUNG: Clear to auscultation bilaterally; No rales, rhonchi, wheezing, or rubs  HEART: Regular rate and rhythm; No murmurs, rubs, or gallops  ABDOMEN: Soft, Nontender, Nondistended, Bowel sounds present, No palpable masses or organomegaly, No bruits  EXTREMITIES:  2+ Peripheral Pulses, No clubbing, cyanosis, or edema  LYMPH: No lymphadenopathy note  SKIN: No rashes or lesions  INCISION:  Dressing clean/dry/intact    LABS:                        14.8   10.6  )-----------( 316      ( 15 Nov 2017 06:29 )             45.8      EKG:   Personally Reviewed: YES  NSR

## 2017-11-15 NOTE — PHYSICAL THERAPY INITIAL EVALUATION ADULT - GAIT DEVIATIONS NOTED, PT EVAL
decreased weight-shifting ability/decreased travon/decreased step length/decreased velocity of limb motion

## 2017-11-15 NOTE — PHYSICAL THERAPY INITIAL EVALUATION ADULT - TRANSFER TRAINING, PT EVAL
Goals 2-4 days, Pt will perform sit to stand w/ rolling walker Goals 2-4 days, Pt will perform sit to stand w/ rolling walker w/ CGA

## 2017-11-16 ENCOUNTER — TRANSCRIPTION ENCOUNTER (OUTPATIENT)
Age: 52
End: 2017-11-16

## 2017-11-16 LAB
ANION GAP SERPL CALC-SCNC: 8 MMOL/L — SIGNIFICANT CHANGE UP (ref 5–17)
BUN SERPL-MCNC: 15 MG/DL — SIGNIFICANT CHANGE UP (ref 7–23)
CALCIUM SERPL-MCNC: 8.9 MG/DL — SIGNIFICANT CHANGE UP (ref 8.4–10.5)
CHLORIDE SERPL-SCNC: 104 MMOL/L — SIGNIFICANT CHANGE UP (ref 96–108)
CO2 SERPL-SCNC: 25 MMOL/L — SIGNIFICANT CHANGE UP (ref 22–31)
CREAT SERPL-MCNC: 0.77 MG/DL — SIGNIFICANT CHANGE UP (ref 0.5–1.3)
GLUCOSE SERPL-MCNC: 108 MG/DL — HIGH (ref 70–99)
HCT VFR BLD CALC: 38.2 % — SIGNIFICANT CHANGE UP (ref 34.5–45)
HGB BLD-MCNC: 12.8 G/DL — SIGNIFICANT CHANGE UP (ref 11.5–15.5)
MCHC RBC-ENTMCNC: 31.9 PG — SIGNIFICANT CHANGE UP (ref 27–34)
MCHC RBC-ENTMCNC: 33.5 GM/DL — SIGNIFICANT CHANGE UP (ref 32–36)
MCV RBC AUTO: 95.3 FL — SIGNIFICANT CHANGE UP (ref 80–100)
PLATELET # BLD AUTO: 261 K/UL — SIGNIFICANT CHANGE UP (ref 150–400)
POTASSIUM SERPL-MCNC: 4 MMOL/L — SIGNIFICANT CHANGE UP (ref 3.5–5.3)
POTASSIUM SERPL-SCNC: 4 MMOL/L — SIGNIFICANT CHANGE UP (ref 3.5–5.3)
RBC # BLD: 4 M/UL — SIGNIFICANT CHANGE UP (ref 3.8–5.2)
RBC # FLD: 12.3 % — SIGNIFICANT CHANGE UP (ref 10.3–14.5)
SODIUM SERPL-SCNC: 137 MMOL/L — SIGNIFICANT CHANGE UP (ref 135–145)
WBC # BLD: 12.7 K/UL — HIGH (ref 3.8–10.5)
WBC # FLD AUTO: 12.7 K/UL — HIGH (ref 3.8–10.5)

## 2017-11-16 PROCEDURE — 99233 SBSQ HOSP IP/OBS HIGH 50: CPT

## 2017-11-16 RX ORDER — HYDROMORPHONE HYDROCHLORIDE 2 MG/ML
6 INJECTION INTRAMUSCULAR; INTRAVENOUS; SUBCUTANEOUS
Qty: 0 | Refills: 0 | Status: DISCONTINUED | OUTPATIENT
Start: 2017-11-16 | End: 2017-11-17

## 2017-11-16 RX ORDER — ZOLPIDEM TARTRATE 10 MG/1
5 TABLET ORAL AT BEDTIME
Qty: 0 | Refills: 0 | Status: DISCONTINUED | OUTPATIENT
Start: 2017-11-16 | End: 2017-11-17

## 2017-11-16 RX ORDER — HYDROMORPHONE HYDROCHLORIDE 2 MG/ML
8 INJECTION INTRAMUSCULAR; INTRAVENOUS; SUBCUTANEOUS
Qty: 0 | Refills: 0 | Status: DISCONTINUED | OUTPATIENT
Start: 2017-11-16 | End: 2017-11-17

## 2017-11-16 RX ORDER — HYDROMORPHONE HYDROCHLORIDE 2 MG/ML
6 INJECTION INTRAMUSCULAR; INTRAVENOUS; SUBCUTANEOUS
Qty: 0 | Refills: 0 | Status: DISCONTINUED | OUTPATIENT
Start: 2017-11-16 | End: 2017-11-16

## 2017-11-16 RX ORDER — ALPRAZOLAM 0.25 MG
1 TABLET ORAL EVERY 8 HOURS
Qty: 0 | Refills: 0 | Status: DISCONTINUED | OUTPATIENT
Start: 2017-11-16 | End: 2017-11-17

## 2017-11-16 RX ORDER — HYDROMORPHONE HYDROCHLORIDE 2 MG/ML
4 INJECTION INTRAMUSCULAR; INTRAVENOUS; SUBCUTANEOUS
Qty: 0 | Refills: 0 | Status: DISCONTINUED | OUTPATIENT
Start: 2017-11-16 | End: 2017-11-16

## 2017-11-16 RX ADMIN — Medication 100 MILLIGRAM(S): at 00:13

## 2017-11-16 RX ADMIN — ZOLPIDEM TARTRATE 5 MILLIGRAM(S): 10 TABLET ORAL at 21:31

## 2017-11-16 RX ADMIN — HYDROMORPHONE HYDROCHLORIDE 8 MILLIGRAM(S): 2 INJECTION INTRAMUSCULAR; INTRAVENOUS; SUBCUTANEOUS at 21:31

## 2017-11-16 RX ADMIN — Medication 1000 MILLIGRAM(S): at 17:37

## 2017-11-16 RX ADMIN — CELECOXIB 200 MILLIGRAM(S): 200 CAPSULE ORAL at 09:08

## 2017-11-16 RX ADMIN — Medication 1000 MILLIGRAM(S): at 09:08

## 2017-11-16 RX ADMIN — CELECOXIB 200 MILLIGRAM(S): 200 CAPSULE ORAL at 17:37

## 2017-11-16 RX ADMIN — Medication 100 MILLIGRAM(S): at 21:30

## 2017-11-16 RX ADMIN — HYDROMORPHONE HYDROCHLORIDE 8 MILLIGRAM(S): 2 INJECTION INTRAMUSCULAR; INTRAVENOUS; SUBCUTANEOUS at 22:01

## 2017-11-16 RX ADMIN — SENNA PLUS 2 TABLET(S): 8.6 TABLET ORAL at 21:32

## 2017-11-16 RX ADMIN — Medication 1 MILLIGRAM(S): at 00:12

## 2017-11-16 RX ADMIN — Medication 1000 MILLIGRAM(S): at 03:48

## 2017-11-16 RX ADMIN — Medication 175 MICROGRAM(S): at 05:26

## 2017-11-16 RX ADMIN — Medication 400 MILLIGRAM(S): at 02:48

## 2017-11-16 RX ADMIN — HYDROMORPHONE HYDROCHLORIDE 8 MILLIGRAM(S): 2 INJECTION INTRAMUSCULAR; INTRAVENOUS; SUBCUTANEOUS at 18:36

## 2017-11-16 RX ADMIN — APIXABAN 2.5 MILLIGRAM(S): 2.5 TABLET, FILM COATED ORAL at 21:31

## 2017-11-16 RX ADMIN — HYDROMORPHONE HYDROCHLORIDE 6 MILLIGRAM(S): 2 INJECTION INTRAMUSCULAR; INTRAVENOUS; SUBCUTANEOUS at 09:08

## 2017-11-16 RX ADMIN — HYDROMORPHONE HYDROCHLORIDE 0.5 MILLIGRAM(S): 2 INJECTION INTRAMUSCULAR; INTRAVENOUS; SUBCUTANEOUS at 03:18

## 2017-11-16 RX ADMIN — HYDROMORPHONE HYDROCHLORIDE 0.5 MILLIGRAM(S): 2 INJECTION INTRAMUSCULAR; INTRAVENOUS; SUBCUTANEOUS at 05:26

## 2017-11-16 RX ADMIN — HYDROMORPHONE HYDROCHLORIDE 8 MILLIGRAM(S): 2 INJECTION INTRAMUSCULAR; INTRAVENOUS; SUBCUTANEOUS at 12:48

## 2017-11-16 RX ADMIN — HYDROMORPHONE HYDROCHLORIDE 8 MILLIGRAM(S): 2 INJECTION INTRAMUSCULAR; INTRAVENOUS; SUBCUTANEOUS at 16:14

## 2017-11-16 RX ADMIN — Medication 75 MILLIGRAM(S): at 21:31

## 2017-11-16 RX ADMIN — HYDROMORPHONE HYDROCHLORIDE 0.5 MILLIGRAM(S): 2 INJECTION INTRAMUSCULAR; INTRAVENOUS; SUBCUTANEOUS at 02:48

## 2017-11-16 RX ADMIN — HYDROMORPHONE HYDROCHLORIDE 8 MILLIGRAM(S): 2 INJECTION INTRAMUSCULAR; INTRAVENOUS; SUBCUTANEOUS at 15:28

## 2017-11-16 RX ADMIN — HYDROMORPHONE HYDROCHLORIDE 8 MILLIGRAM(S): 2 INJECTION INTRAMUSCULAR; INTRAVENOUS; SUBCUTANEOUS at 19:22

## 2017-11-16 RX ADMIN — NORTRIPTYLINE HYDROCHLORIDE 10 MILLIGRAM(S): 10 CAPSULE ORAL at 21:31

## 2017-11-16 RX ADMIN — Medication 1000 MILLIGRAM(S): at 00:00

## 2017-11-16 RX ADMIN — APIXABAN 2.5 MILLIGRAM(S): 2.5 TABLET, FILM COATED ORAL at 09:08

## 2017-11-16 RX ADMIN — Medication 1 MILLIGRAM(S): at 12:12

## 2017-11-16 RX ADMIN — HYDROMORPHONE HYDROCHLORIDE 0.5 MILLIGRAM(S): 2 INJECTION INTRAMUSCULAR; INTRAVENOUS; SUBCUTANEOUS at 05:56

## 2017-11-16 RX ADMIN — HYDROMORPHONE HYDROCHLORIDE 8 MILLIGRAM(S): 2 INJECTION INTRAMUSCULAR; INTRAVENOUS; SUBCUTANEOUS at 12:11

## 2017-11-16 RX ADMIN — Medication 100 MILLIGRAM(S): at 12:11

## 2017-11-16 RX ADMIN — HYDROMORPHONE HYDROCHLORIDE 6 MILLIGRAM(S): 2 INJECTION INTRAMUSCULAR; INTRAVENOUS; SUBCUTANEOUS at 10:00

## 2017-11-16 NOTE — DISCHARGE NOTE ADULT - PATIENT PORTAL LINK FT
“You can access the [employer] Patient Portal, offered by [facility], by registering with the following website: [email or web address]”

## 2017-11-16 NOTE — PROGRESS NOTE ADULT - ASSESSMENT
POD#1 s/p RIGHT THR  - Pain controlled overall  - Bowel regimen  - PT/OT  - DVT PPx per Ortho - Eliquis    Insomnia  - continue Nortryptiline, Lyrica  - will order Ambien PRN, patient distressed about not being able to sleep and does not want to take Xanax to help her sleep (would like to reserve for her anxiety)    Anxiety (described as "severe" by patient)  - Xanax PRN    Recent URI  - completed Zithromax, Tessalon Perles, and Advair (was placed on this for URI)  - d/c symbicort    Nicotine Dependence  - Counseled, not motivated to quit  - does not want nicotine patch     Hypothyroidism  - c/w synthroid

## 2017-11-16 NOTE — DISCHARGE NOTE ADULT - CARE PROVIDER_API CALL
Luis Anders), Orthopaedic Surgery  [address] [place] [address]  Phone: [phone]  Fax: [phone]

## 2017-11-16 NOTE — PROGRESS NOTE ADULT - SUBJECTIVE AND OBJECTIVE BOX
Post-Anesthetic Evaluation:    The Patient was interviewed and evaluated    Vital Signs Last 24 Hrs  T(C): 36.8 (16 Nov 2017 11:59), Max: 36.9 (15 Nov 2017 19:00)  T(F): 98.2 (16 Nov 2017 11:59), Max: 98.5 (15 Nov 2017 19:00)  HR: 87 (16 Nov 2017 11:59) (62 - 87)  BP: 103/68 (16 Nov 2017 11:59) (103/68 - 148/73)  BP(mean): --  RR: 16 (16 Nov 2017 11:59) (16 - 18)  SpO2: 100% (16 Nov 2017 11:59) (96% - 100%)    Evaluation:      (X) No apparent complications or complaints regarding anesthesia care at this time  (X) All questions were answered    Condition:  (X) Stable      ( ) Guarded      ( ) Critical    Recommendations:  (X) None     ( ) Other:

## 2017-11-16 NOTE — DIETITIAN INITIAL EVALUATION ADULT. - OTHER INFO
51F s/p R THR. Nutrition consult ordered for BMI >40. Current BMI 50.6, pt reports she had recent wt gain secondary to limited mobility secondary to hip pain as well as death of spouse (comfort eating). Pt reports she was going to have bariatric sx when she was cleared for hip replacement sx- she reports she saw dietitian and was educated on portion control and has written education material as well. Verbally reinforced general wt loss strategies through nutrition/exercise. Pt states she still may have bariatric sx in the future if lifestyles modifications are not successful.

## 2017-11-16 NOTE — DISCHARGE NOTE ADULT - MEDICATION SUMMARY - MEDICATIONS TO TAKE
I will START or STAY ON the medications listed below when I get home from the hospital:    acetaminophen 500 mg oral tablet  -- 2 tab(s) by mouth every 8 hours  -- Indication: For Pain    HYDROmorphone 8 mg oral tablet  -- 1 tab(s) by mouth every 3 hours, As needed, Moderate Pain (4 - 6)  -- Indication: For Pain     HYDROmorphone 2 mg oral tablet  -- 3 tab(s) by mouth every 3 hours, As needed, Mild Pain (1 - 3)  -- Indication: For Pain    celecoxib 200 mg oral capsule  -- 1 cap(s) by mouth 2 times a day (with meals) for 21 days total postoperatively  -- Indication: For Prevention  of excess bone growth at surgical site    apixaban 2.5 mg oral tablet  -- 1 tab(s) by mouth every 12 hours for 35 days total postoperatively   -- Indication: For Prevention of blood clots    Lyrica 75 mg oral capsule  -- 1 cap(s) by mouth once a day (at bedtime)  -- Indication: For Pain    nortriptyline 10 mg oral capsule  -- 1 cap(s) by mouth once a day (at bedtime)  -- Indication: For Pain    Xanax 1 mg oral tablet  -- 1 tab(s) by mouth once a day, As Needed - for anxiety  -- Indication: For anxiety    zolpidem 5 mg oral tablet  -- 1 tab(s) by mouth once a day (at bedtime), As Needed - for insomnia  -- Indication: For Sleep    senna oral tablet  -- 2 tab(s) by mouth once a day (at bedtime)  -- Indication: For constipation    docusate sodium 100 mg oral capsule  -- 1 cap(s) by mouth 3 times a day  -- Indication: For Stool softener    polyethylene glycol 3350 oral powder for reconstitution  -- 17 gram(s) by mouth once a day, As needed, Constipation  -- Indication: For constipation    pantoprazole 40 mg oral delayed release tablet  -- 1 tab(s) by mouth once a day (before a meal)  -- Indication: For Prevention of ulcers     nicotine 14 mg/24 hr transdermal film, extended release  -- transdermal once a day as directed  -- Indication: For Smoking cessation    Synthroid 175 mcg (0.175 mg) oral tablet  -- 1 tab(s) by mouth once a day  -- Indication: For thyroid disease    Vitamin B-100 oral tablet  -- 1 tab(s) by mouth once a day  -- Indication: For vitamin

## 2017-11-16 NOTE — DISCHARGE NOTE ADULT - MEDICATION SUMMARY - MEDICATIONS TO STOP TAKING
I will STOP taking the medications listed below when I get home from the hospital:    Emergen-C  -- 2 tab(s) by mouth once a day    Advair Diskus 250 mcg-50 mcg inhalation powder  -- 2 puff(s) inhaled 2 times a day    Motrin 600 mg oral tablet  -- 1 tab(s) by mouth every 6 hours

## 2017-11-16 NOTE — DISCHARGE NOTE ADULT - PLAN OF CARE
Improve quality of life Your new total hip replacement requires proper care.  Your surgical care provider is your best resource for information.  Your Physical Therapy /Occupational Therapy will include Ambulation, Transfers , Stairs, ADLs (activities of daily living), range of motion, and isometrics.  Your participation is vital for the fullest recovery and best results.  You may bear full weight as tolerated with rolling walker, cane or assistive device.    TOTAL HIP PRECAUTIONS   Do not bend your hip more than 90 degrees.   Do not rotate your leg drastically inward.   Continue abduction pillow while in bed.   Sit in Hip Chair or a chair that does not allow your to bend more than 90 degrees.  Do not sit on low chairs or low toilet seats.  Do not take a tub bath yet.   Do not resume driving until you have your surgeon’s permission.     Keep incision clean and dry.  Change the dressing daily if there is drainage noted.  When there is no drainage the wound may be open to air.   The wound is closed with either sutures (stiches) or Prineo (glued on mesh tape.)  Both sutures and Prineo are removed 2 weeks after surgery at rehab facility or in surgeon's office.  If there is no wet drainage you may shower and pat dry with a clean towel.

## 2017-11-16 NOTE — PROGRESS NOTE ADULT - SUBJECTIVE AND OBJECTIVE BOX
Post Op Day #1     SUBJECTIVE    50yo Female status post Right THR .   Patient is alert and comfortable.    Complains of severe pain, unrelieved with current pain regimen. Also complains of severe anxiety.   Denies nausea, vomiting, chest pain, shortness of breath, abdominal pain or fever.       OBJECTIVE    Vital Signs Last 24 Hrs  T(C): 36.7 (16 Nov 2017 07:48), Max: 36.9 (15 Nov 2017 19:00)  T(F): 98 (16 Nov 2017 07:48), Max: 98.5 (15 Nov 2017 19:00)  HR: 62 (16 Nov 2017 07:48) (62 - 80)  BP: 114/78 (16 Nov 2017 07:48) (107/68 - 148/73)  BP(mean): --  RR: 16 (16 Nov 2017 07:48) (16 - 18)  SpO2: 99% (16 Nov 2017 07:48) (96% - 100%)  I&O's Summary    15 Nov 2017 07:01  -  16 Nov 2017 07:00  --------------------------------------------------------  IN: 2100 mL / OUT: 900 mL / NET: 1200 mL        PHYSICAL EXAM    Right Hip dressing is clean, dry and intact.   The calf is supple/nontender.   Passive range of motion is acceptable to due postoperative pain.   Sensation to light touch is grossly intact distally.   The lateral cutaneous nerve is intact.   Motor function distally is intact.   No foot drop.   (2+) dorsalis pedis pulse. Capillary refill is less than 2 seconds. No cyanosis.                          12.8   12.7<H> )-----------( 261      ( 16 Nov 2017 07:13 )             38.2   16 Nov 2017 07:13                        13.0   x     )-----------( x        ( 15 Nov 2017 17:24 )             39.5   15 Nov 2017 17:24    16 Nov 2017 07:13    137    |  104    |  15     ----------------------------<  108<H>  4.0     |  25     |  0.77   15 Nov 2017 17:24    136    |  102    |  20     ----------------------------<  169<H>  4.4     |  24     |  1.02     Ca    8.9        16 Nov 2017 07:13  Ca    8.6        15 Nov 2017 17:24        ASSESSMENT AND PLAN    - Orthopedically stable  - DVT prophylaxis: PAS + Eliquis  - Anxiety- Xanax ordered  - Pain- Dilaudid dose adjusted based on pain management recommendation.  -  HO prophylaxis: Celebrex 200mg PO twice daily x21 days  - Continue physical therapy and occupational therapy  - Weight bearing as tolerated of the right lower extremity with assistance of a walker  - Incentive spirometry encouraged  - Pain control as clinically indicated  - Disposition: subacute rehabilitation

## 2017-11-16 NOTE — PROGRESS NOTE ADULT - SUBJECTIVE AND OBJECTIVE BOX
INTERVAL HPI/OVERNIGHT EVENTS:   Patient seen and examined.  Pain controlled overall.  Eating, voided, no BM yet.  C/o anxiety due to surgery,  passed away approx 1 year ago.  C/o insomnia last night and requesting Ambien PRN which she takes at home.    REVIEW OF SYSTEMS:  See HPI,  all others negative    PHYSICAL EXAM:  Vital Signs Last 24 Hrs  T(C): 36.7 (16 Nov 2017 07:48), Max: 36.9 (15 Nov 2017 19:00)  T(F): 98 (16 Nov 2017 07:48), Max: 98.5 (15 Nov 2017 19:00)  HR: 62 (16 Nov 2017 07:48) (62 - 84)  BP: 114/78 (16 Nov 2017 07:48) (105/69 - 148/73)  BP(mean): --  RR: 16 (16 Nov 2017 07:48) (16 - 22)  SpO2: 99% (16 Nov 2017 07:48) (96% - 100%)    GENERAL: NAD, well-groomed, well-developed, awake, alert, oriented x 3, fluent and coherent speech, anxious  HEAD:  Atraumatic, Normocephalic  EYES: EOMI, PERRLA, conjunctiva and sclera clear  ENMT: No tonsillar erythema, exudates, or enlargement; Moist mucous membranes, Good dentition, No lesions  NECK: Supple, No JVD, No Cervical LAD, No thyromegaly, No thyroid nodules felt  NERVOUS SYSTEM:  Good concentration; Moving all 4 extremities; No gross sensory deficits, No facial droop  CHEST/LUNG: Clear to auscultation bilaterally; No rales, rhonchi, wheezing, or rubs  HEART: Regular rate and rhythm; No murmurs, rubs, or gallops  ABDOMEN: Soft, Nontender, Nondistended, Bowel sounds present, No palpable masses or organomegaly, No bruits  EXTREMITIES:  2+ Peripheral Pulses, No clubbing, cyanosis, or edema  LYMPH: No lymphadenopathy noted  SKIN: No rashes or lesions  INCISION: dressing c/d/i    LABS:                        12.8   12.7  )-----------( 261      ( 16 Nov 2017 07:13 )             38.2     16 Nov 2017 07:13    137    |  104    |  15     ----------------------------<  108    4.0     |  25     |  0.77     Ca    8.9        16 Nov 2017 07:13

## 2017-11-17 VITALS
OXYGEN SATURATION: 100 % | SYSTOLIC BLOOD PRESSURE: 110 MMHG | TEMPERATURE: 98 F | HEART RATE: 85 BPM | RESPIRATION RATE: 18 BRPM | DIASTOLIC BLOOD PRESSURE: 72 MMHG

## 2017-11-17 LAB
ANION GAP SERPL CALC-SCNC: 7 MMOL/L — SIGNIFICANT CHANGE UP (ref 5–17)
BUN SERPL-MCNC: 19 MG/DL — SIGNIFICANT CHANGE UP (ref 7–23)
CALCIUM SERPL-MCNC: 8.9 MG/DL — SIGNIFICANT CHANGE UP (ref 8.4–10.5)
CHLORIDE SERPL-SCNC: 103 MMOL/L — SIGNIFICANT CHANGE UP (ref 96–108)
CO2 SERPL-SCNC: 27 MMOL/L — SIGNIFICANT CHANGE UP (ref 22–31)
CREAT SERPL-MCNC: 0.86 MG/DL — SIGNIFICANT CHANGE UP (ref 0.5–1.3)
GLUCOSE SERPL-MCNC: 85 MG/DL — SIGNIFICANT CHANGE UP (ref 70–99)
HCT VFR BLD CALC: 37.2 % — SIGNIFICANT CHANGE UP (ref 34.5–45)
HGB BLD-MCNC: 12 G/DL — SIGNIFICANT CHANGE UP (ref 11.5–15.5)
MCHC RBC-ENTMCNC: 31.3 PG — SIGNIFICANT CHANGE UP (ref 27–34)
MCHC RBC-ENTMCNC: 32.2 GM/DL — SIGNIFICANT CHANGE UP (ref 32–36)
MCV RBC AUTO: 97.1 FL — SIGNIFICANT CHANGE UP (ref 80–100)
PLATELET # BLD AUTO: 230 K/UL — SIGNIFICANT CHANGE UP (ref 150–400)
POTASSIUM SERPL-MCNC: 3.7 MMOL/L — SIGNIFICANT CHANGE UP (ref 3.5–5.3)
POTASSIUM SERPL-SCNC: 3.7 MMOL/L — SIGNIFICANT CHANGE UP (ref 3.5–5.3)
RBC # BLD: 3.83 M/UL — SIGNIFICANT CHANGE UP (ref 3.8–5.2)
RBC # FLD: 12.7 % — SIGNIFICANT CHANGE UP (ref 10.3–14.5)
SODIUM SERPL-SCNC: 137 MMOL/L — SIGNIFICANT CHANGE UP (ref 135–145)
WBC # BLD: 9.8 K/UL — SIGNIFICANT CHANGE UP (ref 3.8–10.5)
WBC # FLD AUTO: 9.8 K/UL — SIGNIFICANT CHANGE UP (ref 3.8–10.5)

## 2017-11-17 PROCEDURE — 80048 BASIC METABOLIC PNL TOTAL CA: CPT

## 2017-11-17 PROCEDURE — 88311 DECALCIFY TISSUE: CPT

## 2017-11-17 PROCEDURE — 97535 SELF CARE MNGMENT TRAINING: CPT

## 2017-11-17 PROCEDURE — 85027 COMPLETE CBC AUTOMATED: CPT

## 2017-11-17 PROCEDURE — 94664 DEMO&/EVAL PT USE INHALER: CPT

## 2017-11-17 PROCEDURE — C1776: CPT

## 2017-11-17 PROCEDURE — 97165 OT EVAL LOW COMPLEX 30 MIN: CPT

## 2017-11-17 PROCEDURE — 97161 PT EVAL LOW COMPLEX 20 MIN: CPT

## 2017-11-17 PROCEDURE — 97530 THERAPEUTIC ACTIVITIES: CPT

## 2017-11-17 PROCEDURE — 73502 X-RAY EXAM HIP UNI 2-3 VIEWS: CPT

## 2017-11-17 PROCEDURE — 97116 GAIT TRAINING THERAPY: CPT

## 2017-11-17 PROCEDURE — 99239 HOSP IP/OBS DSCHRG MGMT >30: CPT

## 2017-11-17 PROCEDURE — 81025 URINE PREGNANCY TEST: CPT

## 2017-11-17 PROCEDURE — 36415 COLL VENOUS BLD VENIPUNCTURE: CPT

## 2017-11-17 PROCEDURE — 88305 TISSUE EXAM BY PATHOLOGIST: CPT

## 2017-11-17 PROCEDURE — 85018 HEMOGLOBIN: CPT

## 2017-11-17 RX ORDER — ACETAMINOPHEN 500 MG
2 TABLET ORAL
Qty: 0 | Refills: 0 | DISCHARGE
Start: 2017-11-17 | End: 2017-11-30

## 2017-11-17 RX ORDER — APIXABAN 2.5 MG/1
1 TABLET, FILM COATED ORAL
Qty: 0 | Refills: 0 | DISCHARGE
Start: 2017-11-17

## 2017-11-17 RX ORDER — HYDROMORPHONE HYDROCHLORIDE 2 MG/ML
3 INJECTION INTRAMUSCULAR; INTRAVENOUS; SUBCUTANEOUS
Qty: 0 | Refills: 0 | DISCHARGE
Start: 2017-11-17

## 2017-11-17 RX ORDER — NICOTINE POLACRILEX 2 MG
1 GUM BUCCAL DAILY
Qty: 0 | Refills: 0 | Status: DISCONTINUED | OUTPATIENT
Start: 2017-11-17 | End: 2017-11-17

## 2017-11-17 RX ORDER — HYDROMORPHONE HYDROCHLORIDE 2 MG/ML
1 INJECTION INTRAMUSCULAR; INTRAVENOUS; SUBCUTANEOUS
Qty: 0 | Refills: 0 | DISCHARGE
Start: 2017-11-17

## 2017-11-17 RX ORDER — DOCUSATE SODIUM 100 MG
1 CAPSULE ORAL
Qty: 0 | Refills: 0 | DISCHARGE
Start: 2017-11-17

## 2017-11-17 RX ORDER — PANTOPRAZOLE SODIUM 20 MG/1
1 TABLET, DELAYED RELEASE ORAL
Qty: 0 | Refills: 0 | DISCHARGE
Start: 2017-11-17

## 2017-11-17 RX ORDER — POLYETHYLENE GLYCOL 3350 17 G/17G
17 POWDER, FOR SOLUTION ORAL
Qty: 0 | Refills: 0 | DISCHARGE
Start: 2017-11-17

## 2017-11-17 RX ORDER — NICOTINE POLACRILEX 2 MG
0 GUM BUCCAL
Qty: 0 | Refills: 0 | DISCHARGE
Start: 2017-11-17

## 2017-11-17 RX ORDER — SENNA PLUS 8.6 MG/1
2 TABLET ORAL
Qty: 0 | Refills: 0 | DISCHARGE
Start: 2017-11-17

## 2017-11-17 RX ORDER — CELECOXIB 200 MG/1
1 CAPSULE ORAL
Qty: 0 | Refills: 0 | DISCHARGE
Start: 2017-11-17

## 2017-11-17 RX ADMIN — Medication 1000 MILLIGRAM(S): at 00:19

## 2017-11-17 RX ADMIN — Medication 1000 MILLIGRAM(S): at 16:26

## 2017-11-17 RX ADMIN — HYDROMORPHONE HYDROCHLORIDE 8 MILLIGRAM(S): 2 INJECTION INTRAMUSCULAR; INTRAVENOUS; SUBCUTANEOUS at 11:00

## 2017-11-17 RX ADMIN — Medication 1000 MILLIGRAM(S): at 04:34

## 2017-11-17 RX ADMIN — APIXABAN 2.5 MILLIGRAM(S): 2.5 TABLET, FILM COATED ORAL at 09:49

## 2017-11-17 RX ADMIN — HYDROMORPHONE HYDROCHLORIDE 8 MILLIGRAM(S): 2 INJECTION INTRAMUSCULAR; INTRAVENOUS; SUBCUTANEOUS at 10:28

## 2017-11-17 RX ADMIN — Medication 100 MILLIGRAM(S): at 13:20

## 2017-11-17 RX ADMIN — Medication 175 MICROGRAM(S): at 05:40

## 2017-11-17 RX ADMIN — HYDROMORPHONE HYDROCHLORIDE 8 MILLIGRAM(S): 2 INJECTION INTRAMUSCULAR; INTRAVENOUS; SUBCUTANEOUS at 07:27

## 2017-11-17 RX ADMIN — HYDROMORPHONE HYDROCHLORIDE 8 MILLIGRAM(S): 2 INJECTION INTRAMUSCULAR; INTRAVENOUS; SUBCUTANEOUS at 00:19

## 2017-11-17 RX ADMIN — CELECOXIB 200 MILLIGRAM(S): 200 CAPSULE ORAL at 16:26

## 2017-11-17 RX ADMIN — Medication 1000 MILLIGRAM(S): at 16:25

## 2017-11-17 RX ADMIN — HYDROMORPHONE HYDROCHLORIDE 8 MILLIGRAM(S): 2 INJECTION INTRAMUSCULAR; INTRAVENOUS; SUBCUTANEOUS at 13:20

## 2017-11-17 RX ADMIN — HYDROMORPHONE HYDROCHLORIDE 8 MILLIGRAM(S): 2 INJECTION INTRAMUSCULAR; INTRAVENOUS; SUBCUTANEOUS at 08:15

## 2017-11-17 RX ADMIN — Medication 1 MILLIGRAM(S): at 00:20

## 2017-11-17 RX ADMIN — HYDROMORPHONE HYDROCHLORIDE 8 MILLIGRAM(S): 2 INJECTION INTRAMUSCULAR; INTRAVENOUS; SUBCUTANEOUS at 14:00

## 2017-11-17 RX ADMIN — HYDROMORPHONE HYDROCHLORIDE 8 MILLIGRAM(S): 2 INJECTION INTRAMUSCULAR; INTRAVENOUS; SUBCUTANEOUS at 00:49

## 2017-11-17 RX ADMIN — Medication 1000 MILLIGRAM(S): at 09:50

## 2017-11-17 RX ADMIN — HYDROMORPHONE HYDROCHLORIDE 8 MILLIGRAM(S): 2 INJECTION INTRAMUSCULAR; INTRAVENOUS; SUBCUTANEOUS at 04:50

## 2017-11-17 RX ADMIN — Medication 100 MILLIGRAM(S): at 05:40

## 2017-11-17 RX ADMIN — HYDROMORPHONE HYDROCHLORIDE 8 MILLIGRAM(S): 2 INJECTION INTRAMUSCULAR; INTRAVENOUS; SUBCUTANEOUS at 16:26

## 2017-11-17 RX ADMIN — Medication 1000 MILLIGRAM(S): at 09:49

## 2017-11-17 RX ADMIN — Medication 1 PATCH: at 13:24

## 2017-11-17 RX ADMIN — HYDROMORPHONE HYDROCHLORIDE 8 MILLIGRAM(S): 2 INJECTION INTRAMUSCULAR; INTRAVENOUS; SUBCUTANEOUS at 17:00

## 2017-11-17 RX ADMIN — CELECOXIB 200 MILLIGRAM(S): 200 CAPSULE ORAL at 09:50

## 2017-11-17 RX ADMIN — HYDROMORPHONE HYDROCHLORIDE 8 MILLIGRAM(S): 2 INJECTION INTRAMUSCULAR; INTRAVENOUS; SUBCUTANEOUS at 04:20

## 2017-11-17 RX ADMIN — PANTOPRAZOLE SODIUM 40 MILLIGRAM(S): 20 TABLET, DELAYED RELEASE ORAL at 05:40

## 2017-11-17 RX ADMIN — POLYETHYLENE GLYCOL 3350 17 GRAM(S): 17 POWDER, FOR SOLUTION ORAL at 09:49

## 2017-11-17 RX ADMIN — CELECOXIB 200 MILLIGRAM(S): 200 CAPSULE ORAL at 16:25

## 2017-11-17 RX ADMIN — CELECOXIB 200 MILLIGRAM(S): 200 CAPSULE ORAL at 09:49

## 2017-11-17 RX ADMIN — Medication 1 MILLIGRAM(S): at 16:55

## 2017-11-17 NOTE — PROGRESS NOTE ADULT - SUBJECTIVE AND OBJECTIVE BOX
INTERVAL HPI/OVERNIGHT EVENTS:   Patient seen and examined.  Pain controlled overall.  Anxiety better controlled this morning as well.  Eating, voided, no BM yet.  Slept much better with Ambien last night.  Ambulated to end of hallway with PT yesterday.    REVIEW OF SYSTEMS:  See HPI,  all others negative    PHYSICAL EXAM:  Vital Signs Last 24 Hrs  T(C): 36.6 (17 Nov 2017 07:10), Max: 36.8 (16 Nov 2017 11:59)  T(F): 97.9 (17 Nov 2017 07:10), Max: 98.3 (16 Nov 2017 23:30)  HR: 75 (17 Nov 2017 07:10) (65 - 87)  BP: 116/65 (17 Nov 2017 07:10) (103/68 - 132/68)  BP(mean): --  RR: 16 (17 Nov 2017 07:10) (16 - 16)  SpO2: 97% (17 Nov 2017 07:10) (97% - 100%)    GENERAL: NAD, well-groomed, well-developed, awake, alert, oriented x 3, fluent and coherent speech, obese, less anxious today  HEAD:  Atraumatic, Normocephalic  EYES: EOMI, PERRLA, conjunctiva and sclera clear  ENMT: No tonsillar erythema, exudates, or enlargement; Moist mucous membranes, Good dentition, No lesions  NECK: Supple, No JVD, No Cervical LAD, No thyromegaly, No thyroid nodules felt  NERVOUS SYSTEM:  Good concentration; Moving all 4 extremities; No gross sensory deficits, No facial droop  CHEST/LUNG: Clear to auscultation bilaterally; No rales, rhonchi, wheezing, or rubs  HEART: Regular rate and rhythm; No murmurs, rubs, or gallops  ABDOMEN: Soft, Nontender, Nondistended, Bowel sounds present, No palpable masses or organomegaly (though exam limited due to obesity), No bruits  EXTREMITIES:  2+ Peripheral Pulses, No clubbing, cyanosis, or pitting edema  LYMPH: No lymphadenopathy noted  SKIN: No rashes or lesions  INCISION: dressing c/d/i  PSYCH:  No suicidal ideation    LABS:                                   12.0   9.8   )-----------( 230      ( 17 Nov 2017 08:28 )             37.2   11-17    137  |  103  |  19  ----------------------------<  85  3.7   |  27  |  0.86    Ca    8.9      17 Nov 2017 08:28

## 2017-11-17 NOTE — PROGRESS NOTE ADULT - SUBJECTIVE AND OBJECTIVE BOX
Orthopedic P.A.- POD# 2 - s/p Right THR    Patient alert and c/o moderate pain in right hip sitting in chair. Denies nausea.    Vital Signs Last 24 Hrs  T(C): 36.7 (17 Nov 2017 11:20), Max: 36.8 (16 Nov 2017 15:37)  T(F): 98.1 (17 Nov 2017 11:20), Max: 98.3 (16 Nov 2017 23:30)  HR: 76 (17 Nov 2017 11:20) (65 - 84)  BP: 131/74 (17 Nov 2017 11:20) (107/69 - 132/68)  BP(mean): --  RR: 16 (17 Nov 2017 11:20) (16 - 16)  SpO2: 100% (17 Nov 2017 11:20) (97% - 100%)         I&O's Detail            Labs:                          12.0   9.8   )-----------( 230      ( 17 Nov 2017 08:28 )             37.2   17 Nov 2017 08:28                 17 Nov 2017 08:28    137    |  103    |  19     ----------------------------<  85     3.7     |  27     |  0.86     Ca    8.9        17 Nov 2017 08:28                    MEDICATIONS:acetaminophen   Tablet. 1000 milliGRAM(s) Oral every 8 hours  ALPRAZolam 1 milliGRAM(s) Oral every 8 hours PRN  aluminum hydroxide/magnesium hydroxide/simethicone Suspension 30 milliLiter(s) Oral four times a day PRN  apixaban 2.5 milliGRAM(s) Oral every 12 hours  bisacodyl Suppository 10 milliGRAM(s) Rectal daily PRN  celecoxib 200 milliGRAM(s) Oral two times a day with meals  docusate sodium 100 milliGRAM(s) Oral three times a day  HYDROmorphone   Tablet 6 milliGRAM(s) Oral every 3 hours PRN  HYDROmorphone   Tablet 8 milliGRAM(s) Oral every 3 hours PRN  HYDROmorphone  Injectable 0.5 milliGRAM(s) IV Push every 3 hours PRN  influenza   Vaccine 0.5 milliLiter(s) IntraMuscular once  lactated ringers. 1000 milliLiter(s) IV Continuous <Continuous>  levothyroxine 175 MICROGram(s) Oral daily  magnesium hydroxide Suspension 30 milliLiter(s) Oral daily PRN  nicotine -  14 mG/24Hr(s) Patch 1 patch Transdermal daily  nortriptyline 10 milliGRAM(s) Oral at bedtime  ondansetron Injectable 4 milliGRAM(s) IV Push every 6 hours PRN  pantoprazole    Tablet 40 milliGRAM(s) Oral before breakfast  polyethylene glycol 3350 17 Gram(s) Oral daily PRN  pregabalin 75 milliGRAM(s) Oral at bedtime  senna 2 Tablet(s) Oral at bedtime  zolpidem 5 milliGRAM(s) Oral at bedtime PRN    Anticoagulation:  apixaban 2.5 milliGRAM(s) Oral every 12 hours      Pain medications:   acetaminophen   Tablet. 1000 milliGRAM(s) Oral every 8 hours  ALPRAZolam 1 milliGRAM(s) Oral every 8 hours PRN  celecoxib 200 milliGRAM(s) Oral two times a day with meals  HYDROmorphone   Tablet 6 milliGRAM(s) Oral every 3 hours PRN  HYDROmorphone   Tablet 8 milliGRAM(s) Oral every 3 hours PRN  HYDROmorphone  Injectable 0.5 milliGRAM(s) IV Push every 3 hours PRN  nortriptyline 10 milliGRAM(s) Oral at bedtime  ondansetron Injectable 4 milliGRAM(s) IV Push every 6 hours PRN  pregabalin 75 milliGRAM(s) Oral at bedtime  zolpidem 5 milliGRAM(s) Oral at bedtime PRN                                       Physical Exam:  Right hip- Abduction pillow in place.  Primary surgical bandage dry and intact.  *Hemovac drain patent.  Neurovascular grossly intact LE's.  EHL/ant.tibs 5/5.  PAS on LE's.  Calves soft and non-tender.                                                                                                                                                        A/P:  Orthopedically stable.  -Continue pain management with current plan  -DVT prophylaxis with Eliquis   -Check labs again tomorrow  -PT/OT to increase ambulation and review dislocation precautions  - * for medical care  -Discharge planning for *  -Further plan as per attendings. Orthopedic P.A.- POD# 2 - s/p Right THR    Patient alert and c/o moderate pain in right hip sitting in chair. Denies nausea.    Vital Signs Last 24 Hrs  T(C): 36.7 (17 Nov 2017 11:20), Max: 36.8 (16 Nov 2017 15:37)  T(F): 98.1 (17 Nov 2017 11:20), Max: 98.3 (16 Nov 2017 23:30)  HR: 76 (17 Nov 2017 11:20) (65 - 84)  BP: 131/74 (17 Nov 2017 11:20) (107/69 - 132/68)  BP(mean): --  RR: 16 (17 Nov 2017 11:20) (16 - 16)  SpO2: 100% (17 Nov 2017 11:20) (97% - 100%)         I&O's Detail            Labs:                          12.0   9.8   )-----------( 230      ( 17 Nov 2017 08:28 )             37.2   17 Nov 2017 08:28                 17 Nov 2017 08:28    137    |  103    |  19     ----------------------------<  85     3.7     |  27     |  0.86     Ca    8.9        17 Nov 2017 08:28                    MEDICATIONS:acetaminophen   Tablet. 1000 milliGRAM(s) Oral every 8 hours  ALPRAZolam 1 milliGRAM(s) Oral every 8 hours PRN  aluminum hydroxide/magnesium hydroxide/simethicone Suspension 30 milliLiter(s) Oral four times a day PRN  apixaban 2.5 milliGRAM(s) Oral every 12 hours  bisacodyl Suppository 10 milliGRAM(s) Rectal daily PRN  celecoxib 200 milliGRAM(s) Oral two times a day with meals  docusate sodium 100 milliGRAM(s) Oral three times a day  HYDROmorphone   Tablet 6 milliGRAM(s) Oral every 3 hours PRN  HYDROmorphone   Tablet 8 milliGRAM(s) Oral every 3 hours PRN  HYDROmorphone  Injectable 0.5 milliGRAM(s) IV Push every 3 hours PRN  influenza   Vaccine 0.5 milliLiter(s) IntraMuscular once  lactated ringers. 1000 milliLiter(s) IV Continuous <Continuous>  levothyroxine 175 MICROGram(s) Oral daily  magnesium hydroxide Suspension 30 milliLiter(s) Oral daily PRN  nicotine -  14 mG/24Hr(s) Patch 1 patch Transdermal daily  nortriptyline 10 milliGRAM(s) Oral at bedtime  ondansetron Injectable 4 milliGRAM(s) IV Push every 6 hours PRN  pantoprazole    Tablet 40 milliGRAM(s) Oral before breakfast  polyethylene glycol 3350 17 Gram(s) Oral daily PRN  pregabalin 75 milliGRAM(s) Oral at bedtime  senna 2 Tablet(s) Oral at bedtime  zolpidem 5 milliGRAM(s) Oral at bedtime PRN    Anticoagulation:  apixaban 2.5 milliGRAM(s) Oral every 12 hours      Pain medications:   acetaminophen   Tablet. 1000 milliGRAM(s) Oral every 8 hours  ALPRAZolam 1 milliGRAM(s) Oral every 8 hours PRN  celecoxib 200 milliGRAM(s) Oral two times a day with meals  HYDROmorphone   Tablet 6 milliGRAM(s) Oral every 3 hours PRN  HYDROmorphone   Tablet 8 milliGRAM(s) Oral every 3 hours PRN  HYDROmorphone  Injectable 0.5 milliGRAM(s) IV Push every 3 hours PRN  nortriptyline 10 milliGRAM(s) Oral at bedtime  ondansetron Injectable 4 milliGRAM(s) IV Push every 6 hours PRN  pregabalin 75 milliGRAM(s) Oral at bedtime  zolpidem 5 milliGRAM(s) Oral at bedtime PRN                                       Physical Exam:  Right hip- Abduction pillow in place.  Primary surgical bandage removed and sterile bandage placed over dry and intact sutured wound.  Neurovascular grossly intact LE's.  EHL/ant.tibs 5/5.  PAS on LE's.  Calves soft and non-tender.                                                                                                                                                        A/P:  Orthopedically stable.  -Continue pain management with current plan  -DVT prophylaxis with Eliquis for 35 days.  -Check labs again tomorrow  -PT/OT to increase ambulation and review posterior dislocation precautions  -Dr. Crum for medical clearance for probable discharge to Rehab today.  -Further plan as per attendings.

## 2017-11-17 NOTE — PROGRESS NOTE ADULT - ASSESSMENT
POD#2 s/p RIGHT THR  - Pain controlled overall  - Bowel regimen  - PT/OT  - DVT PPx per Ortho - Eliquis  - Stable for discharge from medical perspective    Insomnia  - continue Nortryptiline, Lyrica  - Ambien PRN    Anxiety (described as "severe" by patient)  - Xanax PRN    Recent URI  - completed Zithromax, Tessalon Perles, and Advair (was placed on this for URI)  - d/c symbicort    Nicotine Dependence  - requesting nicotine patch today    Hypothyroidism  - c/w synthroid

## 2017-11-29 ENCOUNTER — APPOINTMENT (OUTPATIENT)
Dept: ORTHOPEDIC SURGERY | Facility: CLINIC | Age: 52
End: 2017-11-29
Payer: COMMERCIAL

## 2017-11-29 VITALS
WEIGHT: 293 LBS | DIASTOLIC BLOOD PRESSURE: 76 MMHG | HEART RATE: 93 BPM | BODY MASS INDEX: 48.82 KG/M2 | SYSTOLIC BLOOD PRESSURE: 134 MMHG | HEIGHT: 65 IN

## 2017-11-29 PROCEDURE — 99024 POSTOP FOLLOW-UP VISIT: CPT

## 2017-11-29 PROCEDURE — 73502 X-RAY EXAM HIP UNI 2-3 VIEWS: CPT | Mod: RT

## 2017-12-04 ENCOUNTER — CHART COPY (OUTPATIENT)
Age: 52
End: 2017-12-04

## 2017-12-12 ENCOUNTER — CHART COPY (OUTPATIENT)
Age: 52
End: 2017-12-12

## 2017-12-20 ENCOUNTER — CHART COPY (OUTPATIENT)
Age: 52
End: 2017-12-20

## 2018-01-02 ENCOUNTER — CHART COPY (OUTPATIENT)
Age: 53
End: 2018-01-02

## 2018-01-09 ENCOUNTER — APPOINTMENT (OUTPATIENT)
Dept: ORTHOPEDIC SURGERY | Facility: CLINIC | Age: 53
End: 2018-01-09
Payer: COMMERCIAL

## 2018-01-09 VITALS
WEIGHT: 293 LBS | HEIGHT: 65 IN | HEART RATE: 97 BPM | BODY MASS INDEX: 48.82 KG/M2 | DIASTOLIC BLOOD PRESSURE: 88 MMHG | SYSTOLIC BLOOD PRESSURE: 162 MMHG

## 2018-01-09 PROCEDURE — 99024 POSTOP FOLLOW-UP VISIT: CPT

## 2018-01-09 PROCEDURE — 73502 X-RAY EXAM HIP UNI 2-3 VIEWS: CPT

## 2018-02-27 ENCOUNTER — APPOINTMENT (OUTPATIENT)
Dept: ORTHOPEDIC SURGERY | Facility: CLINIC | Age: 53
End: 2018-02-27
Payer: COMMERCIAL

## 2018-02-27 VITALS — HEIGHT: 65 IN | DIASTOLIC BLOOD PRESSURE: 82 MMHG | SYSTOLIC BLOOD PRESSURE: 170 MMHG | HEART RATE: 80 BPM

## 2018-02-27 DIAGNOSIS — Z96.641 PRESENCE OF RIGHT ARTIFICIAL HIP JOINT: ICD-10-CM

## 2018-02-27 PROCEDURE — 99213 OFFICE O/P EST LOW 20 MIN: CPT

## 2018-02-27 PROCEDURE — 73502 X-RAY EXAM HIP UNI 2-3 VIEWS: CPT

## 2018-02-27 RX ORDER — HYDROMORPHONE HYDROCHLORIDE 2 MG/1
2 TABLET ORAL
Qty: 30 | Refills: 0 | Status: DISCONTINUED | COMMUNITY
Start: 2017-12-04 | End: 2018-02-27

## 2018-02-27 RX ORDER — HYDROMORPHONE HYDROCHLORIDE 2 MG/1
2 TABLET ORAL
Qty: 30 | Refills: 0 | Status: DISCONTINUED | COMMUNITY
Start: 2018-01-02 | End: 2018-02-27

## 2018-02-27 RX ORDER — HYDROMORPHONE HYDROCHLORIDE 2 MG/1
2 TABLET ORAL
Qty: 28 | Refills: 0 | Status: DISCONTINUED | COMMUNITY
Start: 2018-01-09 | End: 2018-02-27

## 2018-02-27 RX ORDER — HYDROMORPHONE HYDROCHLORIDE 4 MG/1
4 TABLET ORAL
Qty: 30 | Refills: 0 | Status: DISCONTINUED | COMMUNITY
Start: 2017-12-04 | End: 2018-02-27

## 2018-02-27 RX ORDER — HYDROMORPHONE HYDROCHLORIDE 4 MG/1
4 TABLET ORAL
Qty: 40 | Refills: 0 | Status: DISCONTINUED | COMMUNITY
Start: 2017-12-12 | End: 2018-02-27

## 2018-02-27 RX ORDER — HYDROMORPHONE HYDROCHLORIDE 4 MG/1
4 TABLET ORAL
Qty: 30 | Refills: 0 | Status: DISCONTINUED | COMMUNITY
Start: 2017-12-20 | End: 2018-02-27

## 2018-03-01 ENCOUNTER — OUTPATIENT (OUTPATIENT)
Dept: OUTPATIENT SERVICES | Facility: HOSPITAL | Age: 53
LOS: 1 days | End: 2018-03-01
Payer: COMMERCIAL

## 2018-03-01 DIAGNOSIS — Z98.890 OTHER SPECIFIED POSTPROCEDURAL STATES: Chronic | ICD-10-CM

## 2018-03-01 DIAGNOSIS — Z90.89 ACQUIRED ABSENCE OF OTHER ORGANS: Chronic | ICD-10-CM

## 2018-03-01 DIAGNOSIS — M54.16 RADICULOPATHY, LUMBAR REGION: ICD-10-CM

## 2018-03-01 PROCEDURE — 62323 NJX INTERLAMINAR LMBR/SAC: CPT

## 2018-03-01 PROCEDURE — 77003 FLUOROGUIDE FOR SPINE INJECT: CPT

## 2018-07-16 PROBLEM — M51.26 OTHER INTERVERTEBRAL DISC DISPLACEMENT, LUMBAR REGION: Chronic | Status: ACTIVE | Noted: 2017-11-01

## 2018-07-26 NOTE — H&P PST ADULT - CARDIOVASCULAR DETAILS
I left her a message and told her we would call back tomorrow-- call her in the am and I can speak with her when you get ahold of her   - (where they wrote celiac we were reffeing to endocrine for severe osteoporosis)----  suggest we refer her to Dr Milla Castaneda here locally for prolia injections - he is a rheumatologist but his office orderes an gives prolia routinely and that is likely best next option for her -- if she is ok with that I can put in refer to Milla Castaneda (or I can talk with her and I am glad to speak with her) positive S1/positive S2

## 2019-06-05 PROBLEM — E66.01 MORBID (SEVERE) OBESITY DUE TO EXCESS CALORIES: Chronic | Status: ACTIVE | Noted: 2017-11-01

## 2019-06-05 PROBLEM — B27.90 INFECTIOUS MONONUCLEOSIS, UNSPECIFIED WITHOUT COMPLICATION: Chronic | Status: ACTIVE | Noted: 2017-11-01

## 2019-06-05 PROBLEM — T14.8XXA OTHER INJURY OF UNSPECIFIED BODY REGION, INITIAL ENCOUNTER: Chronic | Status: ACTIVE | Noted: 2017-11-01

## 2019-06-05 PROBLEM — M50.20 OTHER CERVICAL DISC DISPLACEMENT, UNSPECIFIED CERVICAL REGION: Chronic | Status: ACTIVE | Noted: 2017-11-01

## 2019-06-05 PROBLEM — F41.9 ANXIETY DISORDER, UNSPECIFIED: Chronic | Status: ACTIVE | Noted: 2017-11-01

## 2019-06-05 PROBLEM — J06.9 ACUTE UPPER RESPIRATORY INFECTION, UNSPECIFIED: Chronic | Status: ACTIVE | Noted: 2017-11-01

## 2019-06-05 PROBLEM — E03.9 HYPOTHYROIDISM, UNSPECIFIED: Chronic | Status: ACTIVE | Noted: 2017-11-01

## 2019-06-05 PROBLEM — G62.9 POLYNEUROPATHY, UNSPECIFIED: Chronic | Status: ACTIVE | Noted: 2017-11-01

## 2019-06-05 PROBLEM — Z91.89 OTHER SPECIFIED PERSONAL RISK FACTORS, NOT ELSEWHERE CLASSIFIED: Chronic | Status: ACTIVE | Noted: 2017-11-01

## 2019-06-05 PROBLEM — M19.90 UNSPECIFIED OSTEOARTHRITIS, UNSPECIFIED SITE: Chronic | Status: ACTIVE | Noted: 2017-11-01

## 2019-06-05 PROBLEM — E04.9 NONTOXIC GOITER, UNSPECIFIED: Chronic | Status: ACTIVE | Noted: 2017-11-01

## 2019-06-10 ENCOUNTER — APPOINTMENT (OUTPATIENT)
Dept: INTERNAL MEDICINE | Facility: CLINIC | Age: 54
End: 2019-06-10
Payer: COMMERCIAL

## 2019-06-10 VITALS — DIASTOLIC BLOOD PRESSURE: 100 MMHG | SYSTOLIC BLOOD PRESSURE: 148 MMHG

## 2019-06-10 VITALS
WEIGHT: 293 LBS | HEIGHT: 66 IN | TEMPERATURE: 98.5 F | OXYGEN SATURATION: 99 % | HEART RATE: 94 BPM | BODY MASS INDEX: 47.09 KG/M2

## 2019-06-10 PROCEDURE — 99213 OFFICE O/P EST LOW 20 MIN: CPT

## 2019-06-10 NOTE — PHYSICAL EXAM
[No JVD] : no jugular venous distention [No Respiratory Distress] : no respiratory distress  [Normal Rate] : normal rate  [Clear to Auscultation] : lungs were clear to auscultation bilaterally [Non Tender] : non-tender [Soft] : abdomen soft [Non-distended] : non-distended [Regular Rhythm] : with a regular rhythm [de-identified] : obese

## 2019-06-14 LAB
ALBUMIN SERPL ELPH-MCNC: 4.5 G/DL
ALP BLD-CCNC: 100 U/L
ALT SERPL-CCNC: 15 U/L
ANION GAP SERPL CALC-SCNC: 16 MMOL/L
AST SERPL-CCNC: 13 U/L
BASOPHILS # BLD AUTO: 0.04 K/UL
BASOPHILS NFR BLD AUTO: 0.5 %
BILIRUB SERPL-MCNC: 0.5 MG/DL
BUN SERPL-MCNC: 16 MG/DL
CALCIUM SERPL-MCNC: 9.4 MG/DL
CHLORIDE SERPL-SCNC: 102 MMOL/L
CHOLEST SERPL-MCNC: 202 MG/DL
CHOLEST/HDLC SERPL: 3 RATIO
CO2 SERPL-SCNC: 23 MMOL/L
CREAT SERPL-MCNC: 0.93 MG/DL
EOSINOPHIL # BLD AUTO: 0.19 K/UL
EOSINOPHIL NFR BLD AUTO: 2.4 %
ESTIMATED AVERAGE GLUCOSE: 114 MG/DL
GLUCOSE SERPL-MCNC: 102 MG/DL
HBA1C MFR BLD HPLC: 5.6 %
HCT VFR BLD CALC: 46.9 %
HDLC SERPL-MCNC: 67 MG/DL
HGB BLD-MCNC: 15 G/DL
IMM GRANULOCYTES NFR BLD AUTO: 0.3 %
LDLC SERPL CALC-MCNC: 97 MG/DL
LYMPHOCYTES # BLD AUTO: 1.63 K/UL
LYMPHOCYTES NFR BLD AUTO: 20.5 %
MAN DIFF?: NORMAL
MCHC RBC-ENTMCNC: 31.4 PG
MCHC RBC-ENTMCNC: 32 GM/DL
MCV RBC AUTO: 98.1 FL
MONOCYTES # BLD AUTO: 0.7 K/UL
MONOCYTES NFR BLD AUTO: 8.8 %
NEUTROPHILS # BLD AUTO: 5.39 K/UL
NEUTROPHILS NFR BLD AUTO: 67.5 %
PLATELET # BLD AUTO: 315 K/UL
POTASSIUM SERPL-SCNC: 5.3 MMOL/L
PROT SERPL-MCNC: 6.9 G/DL
RBC # BLD: 4.78 M/UL
RBC # FLD: 13.7 %
SODIUM SERPL-SCNC: 141 MMOL/L
TRIGL SERPL-MCNC: 191 MG/DL
TSH SERPL-ACNC: 5.96 UIU/ML
WBC # FLD AUTO: 7.97 K/UL

## 2019-06-24 ENCOUNTER — OUTPATIENT (OUTPATIENT)
Dept: OUTPATIENT SERVICES | Facility: HOSPITAL | Age: 54
LOS: 1 days | End: 2019-06-24
Payer: COMMERCIAL

## 2019-06-24 DIAGNOSIS — Z90.89 ACQUIRED ABSENCE OF OTHER ORGANS: Chronic | ICD-10-CM

## 2019-06-24 DIAGNOSIS — Z98.890 OTHER SPECIFIED POSTPROCEDURAL STATES: Chronic | ICD-10-CM

## 2019-06-24 DIAGNOSIS — M54.12 RADICULOPATHY, CERVICAL REGION: ICD-10-CM

## 2019-06-24 PROCEDURE — 77003 FLUOROGUIDE FOR SPINE INJECT: CPT

## 2019-06-24 PROCEDURE — 62321 NJX INTERLAMINAR CRV/THRC: CPT

## 2019-07-08 ENCOUNTER — APPOINTMENT (OUTPATIENT)
Dept: INTERNAL MEDICINE | Facility: CLINIC | Age: 54
End: 2019-07-08
Payer: COMMERCIAL

## 2019-07-08 VITALS
OXYGEN SATURATION: 98 % | BODY MASS INDEX: 47.09 KG/M2 | RESPIRATION RATE: 16 BRPM | TEMPERATURE: 98.3 F | WEIGHT: 293 LBS | HEART RATE: 91 BPM | HEIGHT: 66 IN

## 2019-07-08 VITALS — SYSTOLIC BLOOD PRESSURE: 130 MMHG | DIASTOLIC BLOOD PRESSURE: 82 MMHG

## 2019-07-08 PROCEDURE — 99213 OFFICE O/P EST LOW 20 MIN: CPT

## 2019-07-08 NOTE — HISTORY OF PRESENT ILLNESS
[FreeTextEntry8] : Pt felt faint on 3rd of losartan and stopped it   Then had epidural and headache gone and BP running back to normal off meds

## 2019-07-08 NOTE — PHYSICAL EXAM
[No Acute Distress] : no acute distress [Normal Sclera/Conjunctiva] : normal sclera/conjunctiva [No Respiratory Distress] : no respiratory distress  [No JVD] : no jugular venous distention [No Accessory Muscle Use] : no accessory muscle use [Normal Rate] : normal rate  [Regular Rhythm] : with a regular rhythm [No Edema] : there was no peripheral edema [No Extremity Clubbing/Cyanosis] : no extremity clubbing/cyanosis [Coordination Grossly Intact] : coordination grossly intact [No Focal Deficits] : no focal deficits

## 2019-07-08 NOTE — REVIEW OF SYSTEMS
[Hot Flashes] : hot flashes [Fever] : no fever [Chest Pain] : no chest pain [Palpitations] : no palpitations [Shortness Of Breath] : no shortness of breath [Wheezing] : no wheezing [Abdominal Pain] : no abdominal pain [FreeTextEntry2] : no headaches

## 2019-09-03 ENCOUNTER — RX RENEWAL (OUTPATIENT)
Age: 54
End: 2019-09-03

## 2019-09-04 ENCOUNTER — RX RENEWAL (OUTPATIENT)
Age: 54
End: 2019-09-04

## 2019-09-09 ENCOUNTER — RX RENEWAL (OUTPATIENT)
Age: 54
End: 2019-09-09

## 2019-09-09 ENCOUNTER — APPOINTMENT (OUTPATIENT)
Dept: INTERNAL MEDICINE | Facility: CLINIC | Age: 54
End: 2019-09-09
Payer: COMMERCIAL

## 2019-09-09 VITALS
HEART RATE: 96 BPM | WEIGHT: 293 LBS | TEMPERATURE: 98.5 F | HEIGHT: 66 IN | OXYGEN SATURATION: 99 % | BODY MASS INDEX: 47.09 KG/M2 | RESPIRATION RATE: 16 BRPM

## 2019-09-09 VITALS — SYSTOLIC BLOOD PRESSURE: 122 MMHG | DIASTOLIC BLOOD PRESSURE: 78 MMHG

## 2019-09-09 PROCEDURE — 99213 OFFICE O/P EST LOW 20 MIN: CPT

## 2019-09-09 RX ORDER — LOSARTAN POTASSIUM AND HYDROCHLOROTHIAZIDE 12.5; 5 MG/1; MG/1
50-12.5 TABLET ORAL DAILY
Qty: 30 | Refills: 2 | Status: DISCONTINUED | COMMUNITY
Start: 2019-06-10 | End: 2019-09-09

## 2019-09-09 NOTE — HISTORY OF PRESENT ILLNESS
[FreeTextEntry8] : Pt comes for renewal of lyrica which is helping the numbness in her toes.  Still struggling with weight

## 2019-09-09 NOTE — PHYSICAL EXAM
[No Acute Distress] : no acute distress [No JVD] : no jugular venous distention [Normal] : normal rate, regular rhythm, normal S1 and S2 and no murmur heard [No Edema] : there was no peripheral edema [Soft] : abdomen soft [Non Tender] : non-tender [Non-distended] : non-distended

## 2019-09-09 NOTE — REVIEW OF SYSTEMS
[Fever] : no fever [Chest Pain] : no chest pain [Palpitations] : no palpitations [Shortness Of Breath] : no shortness of breath [Lower Ext Edema] : no lower extremity edema [Abdominal Pain] : no abdominal pain [Wheezing] : no wheezing

## 2019-10-28 ENCOUNTER — RX RENEWAL (OUTPATIENT)
Age: 54
End: 2019-10-28

## 2019-11-27 ENCOUNTER — RX RENEWAL (OUTPATIENT)
Age: 54
End: 2019-11-27

## 2020-01-08 ENCOUNTER — APPOINTMENT (OUTPATIENT)
Dept: INTERNAL MEDICINE | Facility: CLINIC | Age: 55
End: 2020-01-08
Payer: COMMERCIAL

## 2020-01-08 VITALS — DIASTOLIC BLOOD PRESSURE: 82 MMHG | SYSTOLIC BLOOD PRESSURE: 132 MMHG

## 2020-01-08 VITALS — OXYGEN SATURATION: 97 % | RESPIRATION RATE: 16 BRPM | HEART RATE: 109 BPM

## 2020-01-08 VITALS — BODY MASS INDEX: 56.81 KG/M2 | WEIGHT: 293 LBS

## 2020-01-08 PROCEDURE — 36415 COLL VENOUS BLD VENIPUNCTURE: CPT

## 2020-01-08 PROCEDURE — 90686 IIV4 VACC NO PRSV 0.5 ML IM: CPT

## 2020-01-08 PROCEDURE — 99213 OFFICE O/P EST LOW 20 MIN: CPT | Mod: 25

## 2020-01-08 PROCEDURE — 90471 IMMUNIZATION ADMIN: CPT

## 2020-01-08 NOTE — HISTORY OF PRESENT ILLNESS
[de-identified] : Pt comes for med renewal Has gained 50 lbs and still smoking  Trying to swim daily  Unable to afford copays for specialists in order to get weight loss surgery

## 2020-01-08 NOTE — PHYSICAL EXAM
[No Acute Distress] : no acute distress [Well Nourished] : well nourished [PERRL] : pupils equal round and reactive to light [Normal Sclera/Conjunctiva] : normal sclera/conjunctiva [No JVD] : no jugular venous distention [No Lymphadenopathy] : no lymphadenopathy [No Accessory Muscle Use] : no accessory muscle use [No Respiratory Distress] : no respiratory distress  [Normal Rate] : normal rate  [Regular Rhythm] : with a regular rhythm [No Edema] : there was no peripheral edema [No Extremity Clubbing/Cyanosis] : no extremity clubbing/cyanosis [de-identified] : essentially clear [de-identified] : soft obese non tender

## 2020-01-08 NOTE — REVIEW OF SYSTEMS
[Cough] : cough [Joint Pain] : joint pain [Back Pain] : back pain [Fever] : no fever [Chills] : no chills [Chest Pain] : no chest pain [Palpitations] : no palpitations [Lower Ext Edema] : no lower extremity edema [Shortness Of Breath] : no shortness of breath [Wheezing] : no wheezing [Abdominal Pain] : no abdominal pain

## 2020-01-09 LAB
25(OH)D3 SERPL-MCNC: 12.4 NG/ML
ALBUMIN SERPL ELPH-MCNC: 4.3 G/DL
ALP BLD-CCNC: 106 U/L
ALT SERPL-CCNC: 20 U/L
ANION GAP SERPL CALC-SCNC: 16 MMOL/L
AST SERPL-CCNC: 19 U/L
BASOPHILS # BLD AUTO: 0.07 K/UL
BASOPHILS NFR BLD AUTO: 0.8 %
BILIRUB SERPL-MCNC: 0.3 MG/DL
BUN SERPL-MCNC: 27 MG/DL
CALCIUM SERPL-MCNC: 9.6 MG/DL
CHLORIDE SERPL-SCNC: 103 MMOL/L
CHOLEST SERPL-MCNC: 213 MG/DL
CHOLEST/HDLC SERPL: 3.3 RATIO
CO2 SERPL-SCNC: 23 MMOL/L
CREAT SERPL-MCNC: 0.95 MG/DL
EOSINOPHIL # BLD AUTO: 0.29 K/UL
EOSINOPHIL NFR BLD AUTO: 3.2 %
ESTIMATED AVERAGE GLUCOSE: 114 MG/DL
GLUCOSE SERPL-MCNC: 137 MG/DL
HBA1C MFR BLD HPLC: 5.6 %
HCT VFR BLD CALC: 47.2 %
HDLC SERPL-MCNC: 64 MG/DL
HGB BLD-MCNC: 14.6 G/DL
IMM GRANULOCYTES NFR BLD AUTO: 0.2 %
LDLC SERPL CALC-MCNC: 117 MG/DL
LYMPHOCYTES # BLD AUTO: 1.86 K/UL
LYMPHOCYTES NFR BLD AUTO: 20.5 %
MAN DIFF?: NORMAL
MCHC RBC-ENTMCNC: 30.9 GM/DL
MCHC RBC-ENTMCNC: 31 PG
MCV RBC AUTO: 100.2 FL
MONOCYTES # BLD AUTO: 0.64 K/UL
MONOCYTES NFR BLD AUTO: 7.1 %
NEUTROPHILS # BLD AUTO: 6.19 K/UL
NEUTROPHILS NFR BLD AUTO: 68.2 %
PLATELET # BLD AUTO: 314 K/UL
POTASSIUM SERPL-SCNC: 4.8 MMOL/L
PROT SERPL-MCNC: 6.5 G/DL
RBC # BLD: 4.71 M/UL
RBC # FLD: 13.8 %
SODIUM SERPL-SCNC: 142 MMOL/L
TRIGL SERPL-MCNC: 159 MG/DL
TSH SERPL-ACNC: 5.87 UIU/ML
WBC # FLD AUTO: 9.07 K/UL

## 2020-01-23 ENCOUNTER — TRANSCRIPTION ENCOUNTER (OUTPATIENT)
Age: 55
End: 2020-01-23

## 2020-02-12 ENCOUNTER — APPOINTMENT (OUTPATIENT)
Dept: INTERNAL MEDICINE | Facility: CLINIC | Age: 55
End: 2020-02-12
Payer: COMMERCIAL

## 2020-02-12 VITALS
BODY MASS INDEX: 47.09 KG/M2 | HEART RATE: 92 BPM | WEIGHT: 293 LBS | DIASTOLIC BLOOD PRESSURE: 84 MMHG | RESPIRATION RATE: 16 BRPM | SYSTOLIC BLOOD PRESSURE: 130 MMHG | HEIGHT: 66 IN | OXYGEN SATURATION: 99 %

## 2020-02-12 PROCEDURE — 99213 OFFICE O/P EST LOW 20 MIN: CPT

## 2020-02-12 NOTE — REVIEW OF SYSTEMS
[Fever] : no fever [Pain] : no pain [Chills] : no chills [Discharge] : no discharge [Chest Pain] : no chest pain [Earache] : no earache [Hearing Loss] : no hearing loss [Palpitations] : no palpitations [Lower Ext Edema] : no lower extremity edema [Shortness Of Breath] : no shortness of breath [Cough] : no cough [Wheezing] : no wheezing [Abdominal Pain] : no abdominal pain

## 2020-02-12 NOTE — HISTORY OF PRESENT ILLNESS
[de-identified] : Pt comes for med renewal and follow up  Still smoking  No weight loss  Not dieting.  TSH was high despite adjustment so will increase synthroid

## 2020-02-12 NOTE — PHYSICAL EXAM
[Normal Sclera/Conjunctiva] : normal sclera/conjunctiva [No Acute Distress] : no acute distress [No Lymphadenopathy] : no lymphadenopathy [No JVD] : no jugular venous distention [Normal Outer Ear/Nose] : the outer ears and nose were normal in appearance [No Respiratory Distress] : no respiratory distress  [No Accessory Muscle Use] : no accessory muscle use [Normal Rate] : normal rate  [Clear to Auscultation] : lungs were clear to auscultation bilaterally [Normal S1, S2] : normal S1 and S2 [Regular Rhythm] : with a regular rhythm [No Edema] : there was no peripheral edema [Soft] : abdomen soft [No Extremity Clubbing/Cyanosis] : no extremity clubbing/cyanosis [de-identified] : obese

## 2020-03-02 ENCOUNTER — RX RENEWAL (OUTPATIENT)
Age: 55
End: 2020-03-02

## 2020-04-20 ENCOUNTER — APPOINTMENT (OUTPATIENT)
Dept: INTERNAL MEDICINE | Facility: CLINIC | Age: 55
End: 2020-04-20
Payer: COMMERCIAL

## 2020-04-20 PROCEDURE — 99213 OFFICE O/P EST LOW 20 MIN: CPT | Mod: 95

## 2020-04-20 NOTE — REVIEW OF SYSTEMS
[Anxiety] : anxiety [Chills] : no chills [Fever] : no fever [Chest Pain] : no chest pain [Palpitations] : no palpitations [Shortness Of Breath] : no shortness of breath [Depression] : no depression [Wheezing] : no wheezing [de-identified] : acne

## 2020-05-18 ENCOUNTER — APPOINTMENT (OUTPATIENT)
Dept: INTERNAL MEDICINE | Facility: CLINIC | Age: 55
End: 2020-05-18
Payer: COMMERCIAL

## 2020-05-18 PROCEDURE — 99213 OFFICE O/P EST LOW 20 MIN: CPT | Mod: 95

## 2020-05-18 NOTE — REVIEW OF SYSTEMS
[Fever] : no fever [Chills] : no chills [Discharge] : discharge [Dryness] : dryness  [Palpitations] : no palpitations [Chest Pain] : no chest pain [Lower Ext Edema] : no lower extremity edema [Wheezing] : no wheezing [Shortness Of Breath] : no shortness of breath [Cough] : no cough [Abdominal Pain] : no abdominal pain

## 2020-05-18 NOTE — PHYSICAL EXAM
[No Acute Distress] : no acute distress [Normal Sclera/Conjunctiva] : normal sclera/conjunctiva [Normal Outer Ear/Nose] : the outer ears and nose were normal in appearance [No JVD] : no jugular venous distention [No Respiratory Distress] : no respiratory distress

## 2020-05-18 NOTE — HISTORY OF PRESENT ILLNESS
[Home] : at home, [unfilled] , at the time of the visit. [Medical Office: ([facility])___] : at the medical office located in  [Patient] : the patient [Self] : self [de-identified] : Pt asks for telehealth visit for med renewal

## 2020-08-10 ENCOUNTER — APPOINTMENT (OUTPATIENT)
Dept: INTERNAL MEDICINE | Facility: CLINIC | Age: 55
End: 2020-08-10
Payer: COMMERCIAL

## 2020-08-10 PROCEDURE — 99213 OFFICE O/P EST LOW 20 MIN: CPT | Mod: 95

## 2020-08-10 NOTE — REVIEW OF SYSTEMS
[Fever] : no fever [Chills] : no chills [Chest Pain] : no chest pain [Palpitations] : no palpitations [Lower Ext Edema] : no lower extremity edema [Shortness Of Breath] : no shortness of breath [Wheezing] : no wheezing [Cough] : no cough [Joint Pain] : joint pain [Joint Stiffness] : joint stiffness

## 2020-08-10 NOTE — PHYSICAL EXAM
[No Acute Distress] : no acute distress [Normal Sclera/Conjunctiva] : normal sclera/conjunctiva [Normal Outer Ear/Nose] : the outer ears and nose were normal in appearance [No Respiratory Distress] : no respiratory distress  [No JVD] : no jugular venous distention

## 2020-08-10 NOTE — HISTORY OF PRESENT ILLNESS
[de-identified] : Pt asks for telehealth during pandemic for med renewal  Will be  trying weight loss surgery [Medical Office: ([facility])___] : at the medical office located in  [Home] : at home, [unfilled] , at the time of the visit. [Verbal consent obtained from patient] : the patient, [unfilled]

## 2020-09-08 ENCOUNTER — APPOINTMENT (OUTPATIENT)
Dept: CARDIOLOGY | Facility: CLINIC | Age: 55
End: 2020-09-08
Payer: COMMERCIAL

## 2020-09-08 ENCOUNTER — NON-APPOINTMENT (OUTPATIENT)
Age: 55
End: 2020-09-08

## 2020-09-08 VITALS
HEART RATE: 76 BPM | WEIGHT: 293 LBS | BODY MASS INDEX: 47.09 KG/M2 | OXYGEN SATURATION: 98 % | TEMPERATURE: 97.6 F | DIASTOLIC BLOOD PRESSURE: 80 MMHG | SYSTOLIC BLOOD PRESSURE: 130 MMHG | HEIGHT: 66 IN

## 2020-09-08 PROCEDURE — 93000 ELECTROCARDIOGRAM COMPLETE: CPT

## 2020-09-08 PROCEDURE — 99214 OFFICE O/P EST MOD 30 MIN: CPT

## 2020-09-08 RX ORDER — CYCLOBENZAPRINE HYDROCHLORIDE 10 MG/1
10 TABLET, FILM COATED ORAL
Qty: 60 | Refills: 0 | Status: DISCONTINUED | COMMUNITY
Start: 2017-05-10 | End: 2020-09-08

## 2020-09-08 RX ORDER — AMOXICILLIN 500 MG/1
500 CAPSULE ORAL
Qty: 20 | Refills: 4 | Status: DISCONTINUED | COMMUNITY
Start: 2017-11-29 | End: 2020-09-08

## 2020-09-08 RX ORDER — BUTALBITAL, ACETAMINOPHEN AND CAFFEINE 300; 50; 40 MG/1; MG/1; MG/1
50-300-40 CAPSULE ORAL
Qty: 30 | Refills: 0 | Status: DISCONTINUED | COMMUNITY
Start: 2017-05-10 | End: 2020-09-08

## 2020-09-08 RX ORDER — FUROSEMIDE 20 MG/1
20 TABLET ORAL
Qty: 30 | Refills: 0 | Status: DISCONTINUED | COMMUNITY
Start: 2017-03-06 | End: 2020-09-08

## 2020-09-09 ENCOUNTER — APPOINTMENT (OUTPATIENT)
Dept: CARDIOLOGY | Facility: CLINIC | Age: 55
End: 2020-09-09
Payer: COMMERCIAL

## 2020-09-09 ENCOUNTER — MED ADMIN CHARGE (OUTPATIENT)
Age: 55
End: 2020-09-09

## 2020-09-09 PROCEDURE — A9500: CPT

## 2020-09-09 PROCEDURE — 93015 CV STRESS TEST SUPVJ I&R: CPT

## 2020-09-09 PROCEDURE — 78451 HT MUSCLE IMAGE SPECT SING: CPT

## 2020-09-09 RX ORDER — REGADENOSON 0.08 MG/ML
0.4 INJECTION, SOLUTION INTRAVENOUS
Qty: 1 | Refills: 0 | Status: COMPLETED | OUTPATIENT
Start: 2020-09-09

## 2020-09-09 RX ADMIN — REGADENOSON 4 MG/5ML: 0.08 INJECTION, SOLUTION INTRAVENOUS at 00:00

## 2020-09-16 ENCOUNTER — APPOINTMENT (OUTPATIENT)
Dept: CARDIOLOGY | Facility: CLINIC | Age: 55
End: 2020-09-16

## 2020-09-16 ENCOUNTER — APPOINTMENT (OUTPATIENT)
Dept: CARDIOLOGY | Facility: CLINIC | Age: 55
End: 2020-09-16
Payer: COMMERCIAL

## 2020-09-16 PROCEDURE — 93306 TTE W/DOPPLER COMPLETE: CPT

## 2020-09-17 NOTE — HISTORY OF PRESENT ILLNESS
[FreeTextEntry1] : Alisa 50yo lady with a PMH of tobacco use; here for pre-op evaluation.  Needs a right hip replacement, but cannot proceed 2/2 weight... need bariatric surgery prior to hip replacement.  Very sedentary lifestyle 2/2 severe hip pain, but denied chest pain/dyspnea/palpitations/syncope.  Compliant with meds.  +smoker ~1/2PPD.\par \par 8/9/17: s/p 2D echo and nuclear stress test\par -2D echo: LVEF 60% with no wall motion abnl; mild MR\par -nuclear stress test: normal study with breast attenuation artifact; no evidence of ischemia by EKG; LVEF 66% \par \par 10/16/17: feeling well.  Completely asymptomatic.  Denied chest pain/dyspnea/palpitations/syncope.  2D echo and nuclear stress test normal as above. \par \par 9/8/2020:  s/p hip replacement.  Since that time, unable to exercise 2/2 knee osteoarthitis.  Gained >50lbs.  Awaiting bariatric surgery.  Very SOB with minimal exertion.

## 2020-09-17 NOTE — REASON FOR VISIT
[Follow-Up - Clinic] : a clinic follow-up of [Abnormal ECG] : an abnormal ECG [FreeTextEntry1] : pre-op evaluation

## 2020-09-17 NOTE — PHYSICAL EXAM
[General Appearance - Well Developed] : well developed [Normal Appearance] : normal appearance [Well Groomed] : well groomed [No Deformities] : no deformities [General Appearance - Well Nourished] : well nourished [General Appearance - In No Acute Distress] : no acute distress [Eyelids - No Xanthelasma] : the eyelids demonstrated no xanthelasmas [Normal Conjunctiva] : the conjunctiva exhibited no abnormalities [Normal Oral Mucosa] : normal oral mucosa [No Oral Cyanosis] : no oral cyanosis [No Oral Pallor] : no oral pallor [Normal Jugular Venous A Waves Present] : normal jugular venous A waves present [Normal Jugular Venous V Waves Present] : normal jugular venous V waves present [No Jugular Venous Lee A Waves] : no jugular venous lee A waves [Abdomen Soft] : soft [Abdomen Tenderness] : non-tender [Abdomen Mass (___ Cm)] : no abdominal mass palpated [Cyanosis, Localized] : no localized cyanosis [Nail Clubbing] : no clubbing of the fingernails [Petechial Hemorrhages (___cm)] : no petechial hemorrhages [Skin Color & Pigmentation] : normal skin color and pigmentation [] : no rash [No Venous Stasis] : no venous stasis [Skin Lesions] : no skin lesions [No Skin Ulcers] : no skin ulcer [No Xanthoma] : no  xanthoma was observed [Oriented To Time, Place, And Person] : oriented to person, place, and time [Affect] : the affect was normal [Mood] : the mood was normal [No Anxiety] : not feeling anxious [Normal Rate] : normal [Normal S1] : normal S1 [Normal S2] : normal S2 [II] : a grade 2 [2+] : left 2+ [No Abnormalities] : the abdominal aorta was not enlarged and no bruit was heard [No Pitting Edema] : no pitting edema present [Normal Rhythm/Effort] : normal respiratory rhythm and effort [Clear Bilaterally] : the lungs were clear to auscultation bilaterally [Normal to Percussion] : the lungs were normal to percussion [Normal] : palpation of the chest was normal [FreeTextEntry1] : walks with cane [S3] : no S3 [S4] : no S4 [Right Carotid Bruit] : no bruit heard over the right carotid [Left Carotid Bruit] : no bruit heard over the left carotid [Right Femoral Bruit] : no bruit heard over the right femoral artery [Left Femoral Bruit] : no bruit heard over the left femoral artery

## 2020-09-17 NOTE — DISCUSSION/SUMMARY
[FreeTextEntry1] : 1)Cardiac:  dyspnea on minimal exertion.\par Awaiting cardiac clearance for baristric surgery with general anesthesia.\par -2D echo\par -nuclear stress test\par 2)HTN: BP at goal today\par -cont low-salt diet\par -unable to exercise\par 3)Clearance: pending above\par \par 9/17/20 Addendum:\par Echo: Nl LVEF with nl wall motion abnl and no significant valvular lesions\par Nuclear stress test 9/9/20: normal lexiscan study; no evidence of infarct or ischemia; LVEF 50%\par \par Pt cleared to proceed with bariatric surgery from a cardiac standpoint, without the need for any further cardiac testing at this time.

## 2020-10-09 ENCOUNTER — NON-APPOINTMENT (OUTPATIENT)
Age: 55
End: 2020-10-09

## 2020-10-21 ENCOUNTER — APPOINTMENT (OUTPATIENT)
Dept: INTERNAL MEDICINE | Facility: CLINIC | Age: 55
End: 2020-10-21
Payer: COMMERCIAL

## 2020-10-21 PROCEDURE — 99213 OFFICE O/P EST LOW 20 MIN: CPT | Mod: 25,95

## 2020-10-21 NOTE — HISTORY OF PRESENT ILLNESS
[de-identified] : Pt asks for telhealthvisit during pandemic for med renewal  Feels well  Going for major gastric surgery [Home] : at home, [unfilled] , at the time of the visit. [Medical Office: ([facility])___] : at the medical office located in  [Verbal consent obtained from patient] : the patient, [unfilled]

## 2020-11-10 ENCOUNTER — OUTPATIENT (OUTPATIENT)
Dept: OUTPATIENT SERVICES | Facility: HOSPITAL | Age: 55
LOS: 1 days | End: 2020-11-10

## 2020-11-10 DIAGNOSIS — Z98.890 OTHER SPECIFIED POSTPROCEDURAL STATES: Chronic | ICD-10-CM

## 2020-11-10 DIAGNOSIS — Z90.89 ACQUIRED ABSENCE OF OTHER ORGANS: Chronic | ICD-10-CM

## 2020-11-16 ENCOUNTER — APPOINTMENT (OUTPATIENT)
Dept: INTERNAL MEDICINE | Facility: CLINIC | Age: 55
End: 2020-11-16
Payer: COMMERCIAL

## 2020-11-16 VITALS
WEIGHT: 293 LBS | HEIGHT: 66 IN | OXYGEN SATURATION: 96 % | BODY MASS INDEX: 47.09 KG/M2 | HEART RATE: 100 BPM | RESPIRATION RATE: 16 BRPM

## 2020-11-16 VITALS — SYSTOLIC BLOOD PRESSURE: 130 MMHG | DIASTOLIC BLOOD PRESSURE: 80 MMHG

## 2020-11-16 DIAGNOSIS — Z02.82 ENCOUNTER FOR ADOPTION SERVICES: ICD-10-CM

## 2020-11-16 PROCEDURE — 99214 OFFICE O/P EST MOD 30 MIN: CPT | Mod: 25

## 2020-11-16 PROCEDURE — 99072 ADDL SUPL MATRL&STAF TM PHE: CPT

## 2020-11-16 PROCEDURE — 90471 IMMUNIZATION ADMIN: CPT

## 2020-11-16 PROCEDURE — 90686 IIV4 VACC NO PRSV 0.5 ML IM: CPT

## 2020-11-16 NOTE — HISTORY OF PRESENT ILLNESS
[FreeTextEntry4] : Pt comes for preop  see form not this note  Pt had pulm and cardiac clearance done already

## 2020-11-16 NOTE — PHYSICAL EXAM
[No Acute Distress] : no acute distress [Normal Sclera/Conjunctiva] : normal sclera/conjunctiva [Normal Outer Ear/Nose] : the outer ears and nose were normal in appearance [No JVD] : no jugular venous distention [No Respiratory Distress] : no respiratory distress  [No Accessory Muscle Use] : no accessory muscle use [Clear to Auscultation] : lungs were clear to auscultation bilaterally [Normal Rate] : normal rate  [Regular Rhythm] : with a regular rhythm [Normal S1, S2] : normal S1 and S2 [No Edema] : there was no peripheral edema [No Extremity Clubbing/Cyanosis] : no extremity clubbing/cyanosis

## 2020-11-21 ENCOUNTER — APPOINTMENT (OUTPATIENT)
Dept: DISASTER EMERGENCY | Facility: CLINIC | Age: 55
End: 2020-11-21

## 2020-11-22 LAB — SARS-COV-2 N GENE NPH QL NAA+PROBE: NOT DETECTED

## 2020-11-24 ENCOUNTER — OUTPATIENT (OUTPATIENT)
Dept: OUTPATIENT SERVICES | Facility: HOSPITAL | Age: 55
LOS: 1 days | End: 2020-11-24
Payer: COMMERCIAL

## 2020-11-24 DIAGNOSIS — Z98.890 OTHER SPECIFIED POSTPROCEDURAL STATES: Chronic | ICD-10-CM

## 2020-11-24 DIAGNOSIS — Z90.89 ACQUIRED ABSENCE OF OTHER ORGANS: Chronic | ICD-10-CM

## 2020-11-25 PROCEDURE — 74220 X-RAY XM ESOPHAGUS 1CNTRST: CPT | Mod: 26

## 2020-11-30 RX ORDER — AMOXICILLIN 500 MG/1
500 TABLET, FILM COATED ORAL
Qty: 21 | Refills: 0 | Status: DISCONTINUED | COMMUNITY
Start: 2017-07-06 | End: 2020-11-30

## 2021-01-20 ENCOUNTER — APPOINTMENT (OUTPATIENT)
Dept: INTERNAL MEDICINE | Facility: CLINIC | Age: 56
End: 2021-01-20
Payer: COMMERCIAL

## 2021-01-20 PROCEDURE — 99213 OFFICE O/P EST LOW 20 MIN: CPT | Mod: 95

## 2021-01-20 NOTE — HISTORY OF PRESENT ILLNESS
[Home] : at home, [unfilled] , at the time of the visit. [Medical Office: ([facility])___] : at the medical office located in  [Verbal consent obtained from patient] : the patient, [unfilled] [de-identified] : Pt asks for telhealht visit during pandemic for med renewal  Has lost 25 lbs since surgery but neuropathy of feet is slightly worse despite pregabalin

## 2021-01-20 NOTE — REVIEW OF SYSTEMS
[Fever] : no fever [Chills] : no chills [Negative] : Gastrointestinal [de-identified] : neuropathy of feet slightly worse

## 2021-04-19 ENCOUNTER — APPOINTMENT (OUTPATIENT)
Dept: INTERNAL MEDICINE | Facility: CLINIC | Age: 56
End: 2021-04-19
Payer: COMMERCIAL

## 2021-04-19 PROCEDURE — 99213 OFFICE O/P EST LOW 20 MIN: CPT | Mod: 95

## 2021-04-19 NOTE — PHYSICAL EXAM
[No Acute Distress] : no acute distress [Normal Sclera/Conjunctiva] : normal sclera/conjunctiva [Normal Outer Ear/Nose] : the outer ears and nose were normal in appearance [No Respiratory Distress] : no respiratory distress  [No JVD] : no jugular venous distention [Normal Affect] : the affect was normal

## 2021-04-19 NOTE — HISTORY OF PRESENT ILLNESS
[Home] : at home, [unfilled] , at the time of the visit. [Medical Office: ([facility])___] : at the medical office located in  [Verbal consent obtained from patient] : the patient, [unfilled] [de-identified] : Pt asks for telhealth visit during pandemic for med renewal Also noticed headaaches occassionally after she stopped nortriptyline

## 2021-04-19 NOTE — REVIEW OF SYSTEMS
[Fever] : no fever [Chills] : no chills [Chest Pain] : no chest pain [Palpitations] : no palpitations [Lower Ext Edema] : no lower extremity edema [Wheezing] : no wheezing [Shortness Of Breath] : no shortness of breath [Cough] : no cough [Abdominal Pain] : no abdominal pain

## 2021-05-13 NOTE — DISCHARGE NOTE ADULT - HOSPITAL COURSE
Initial Anesthesia Post-op Note    Patient: Eloise Lockhart  Procedure(s) Performed: LAPAROSCOPIC LOW ANTERIOR RESECTION, LAPAROSCOPIC SPLENIC FLEXURE MOBILIZATION  Anesthesia type: General    Vitals Value Taken Time   Temp 36.5 °C (97.7 °F) 05/13/21 1420   Pulse 87 05/13/21 1430   Resp 14 05/13/21 1430   SpO2 100 % 05/13/21 1430   /70 05/13/21 1430         Patient Location: PACU Phase 1  Post-op Vital Signs:stable  Level of Consciousness: participates in exam and awake  Respiratory Status: spontaneous ventilation and face mask  Cardiovascular blood pressure returned to baseline  Hydration: euvolemic  Pain Management: well controlled  Handoff: Handoff to receiving clinician was performed and questions were answered Nausea: None  Airway Patency:patent  Post-op Assessment: awake, alert, appropriately conversant, or baseline, no complications, patient tolerated procedure well with no complications, no evidence of recall, dentition within defined limits, moving all extremities and No Corneal Abrasion  Comments: +VSS, report given to PACU RN      No complications documented.   SIMIN DIA    Admitted on 11-15-17     51y y.o.  Female with history of DJD (degenerative joint disease) of pelvis: Right    Surgery:   Hip replacement    Orthopedic Surgeon:   Dr. RUTH ANN Anders    Karen-operative antibiotic:  ceFAZolin   IVPB:,       Consulted Services : Hospitalist, Physical Therapy, Occupational Therapy    Typical Physical & occupational therapy modalities post Hip replacement   were performed including ambulation training, range of motion, ADL's, and transfers.     DVT prophylaxis:  apixaban: 2.5 milliGRAM(s)       The patient had a clean appearing surgical incision with no sign of surgical site infections and had a stable neuro / vascular exam of the operated extremity.  After progression of mobility guided by the PT/ OT staff,  the patient was felt to benefit from further rehabilitative care for restoration to level of function. This was felt to best be accomplished in Rehab.    Discharge and Orthopedic Care instructions were delineated in the Discharge Plan and reviewed with the patient. All medications were delineated in the medication reconciliation tool and key points were reviewed with the patient.     This patient was deemed stable from an Orthopedic & medical standpoint for discharge today.  Upon discharge from the rehab facility she will follow up with Dr. RUTH ANN Anders for further Orthopedic care.

## 2021-07-26 ENCOUNTER — APPOINTMENT (OUTPATIENT)
Dept: INTERNAL MEDICINE | Facility: CLINIC | Age: 56
End: 2021-07-26
Payer: COMMERCIAL

## 2021-07-26 PROCEDURE — 99213 OFFICE O/P EST LOW 20 MIN: CPT | Mod: 95

## 2021-10-04 ENCOUNTER — APPOINTMENT (OUTPATIENT)
Dept: INTERNAL MEDICINE | Facility: CLINIC | Age: 56
End: 2021-10-04
Payer: COMMERCIAL

## 2021-10-04 PROCEDURE — 99213 OFFICE O/P EST LOW 20 MIN: CPT | Mod: 95

## 2021-10-04 NOTE — REVIEW OF SYSTEMS
[Fever] : no fever [Chills] : no chills [Chest Pain] : no chest pain [Palpitations] : no palpitations [Lower Ext Edema] : no lower extremity edema [Shortness Of Breath] : no shortness of breath [Wheezing] : no wheezing [Cough] : no cough [Abdominal Pain] : no abdominal pain [Joint Pain] : joint pain [Joint Stiffness] : joint stiffness [Joint Swelling] : no joint swelling

## 2021-10-04 NOTE — HISTORY OF PRESENT ILLNESS
[Home] : at home, [unfilled] , at the time of the visit. [Medical Office: ([facility])___] : at the medical office located in  [Verbal consent obtained from patient] : the patient, [unfilled] [de-identified] : Pt asks for telehealth visit for med renewal  Pt has been using arthrotec every once in a while for hip

## 2021-12-02 ENCOUNTER — NON-APPOINTMENT (OUTPATIENT)
Age: 56
End: 2021-12-02

## 2021-12-10 ENCOUNTER — OUTPATIENT (OUTPATIENT)
Dept: OUTPATIENT SERVICES | Facility: HOSPITAL | Age: 56
LOS: 1 days | End: 2021-12-10
Payer: COMMERCIAL

## 2021-12-10 DIAGNOSIS — Z20.828 CONTACT WITH AND (SUSPECTED) EXPOSURE TO OTHER VIRAL COMMUNICABLE DISEASES: ICD-10-CM

## 2021-12-10 DIAGNOSIS — Z98.890 OTHER SPECIFIED POSTPROCEDURAL STATES: Chronic | ICD-10-CM

## 2021-12-10 DIAGNOSIS — Z90.89 ACQUIRED ABSENCE OF OTHER ORGANS: Chronic | ICD-10-CM

## 2021-12-10 LAB — SARS-COV-2 RNA SPEC QL NAA+PROBE: SIGNIFICANT CHANGE UP

## 2021-12-10 PROCEDURE — U0005: CPT

## 2021-12-10 PROCEDURE — U0003: CPT

## 2021-12-13 ENCOUNTER — OUTPATIENT (OUTPATIENT)
Dept: OUTPATIENT SERVICES | Facility: HOSPITAL | Age: 56
LOS: 1 days | End: 2021-12-13
Payer: COMMERCIAL

## 2021-12-13 DIAGNOSIS — Z98.890 OTHER SPECIFIED POSTPROCEDURAL STATES: Chronic | ICD-10-CM

## 2021-12-13 DIAGNOSIS — M54.12 RADICULOPATHY, CERVICAL REGION: ICD-10-CM

## 2021-12-13 DIAGNOSIS — Z90.89 ACQUIRED ABSENCE OF OTHER ORGANS: Chronic | ICD-10-CM

## 2021-12-13 PROCEDURE — 62321 NJX INTERLAMINAR CRV/THRC: CPT

## 2021-12-20 ENCOUNTER — APPOINTMENT (OUTPATIENT)
Dept: INTERNAL MEDICINE | Facility: CLINIC | Age: 56
End: 2021-12-20
Payer: COMMERCIAL

## 2021-12-20 PROCEDURE — 99213 OFFICE O/P EST LOW 20 MIN: CPT | Mod: 95

## 2021-12-20 NOTE — HISTORY OF PRESENT ILLNESS
[Home] : at home, [unfilled] , at the time of the visit. [Medical Office: ([facility])___] : at the medical office located in  [Verbal consent obtained from patient] : the patient, [unfilled] [de-identified] : Pt asks for teb for med renewal  Having knee and neck pains.  Seeing ortho and Dr Stephenson

## 2022-01-24 ENCOUNTER — APPOINTMENT (OUTPATIENT)
Dept: INTERNAL MEDICINE | Facility: CLINIC | Age: 57
End: 2022-01-24
Payer: COMMERCIAL

## 2022-01-24 PROCEDURE — 99213 OFFICE O/P EST LOW 20 MIN: CPT | Mod: 95

## 2022-01-24 NOTE — HISTORY OF PRESENT ILLNESS
[Home] : at home, [unfilled] , at the time of the visit. [Medical Office: ([facility])___] : at the medical office located in  [Verbal consent obtained from patient] : the patient, [unfilled] [de-identified] : Pt asks for teb for med renewal  Having arthritis pains and insomnia and rash from mask

## 2022-01-25 ENCOUNTER — APPOINTMENT (OUTPATIENT)
Dept: ORTHOPEDIC SURGERY | Facility: CLINIC | Age: 57
End: 2022-01-25
Payer: COMMERCIAL

## 2022-01-25 ENCOUNTER — NON-APPOINTMENT (OUTPATIENT)
Age: 57
End: 2022-01-25

## 2022-01-25 VITALS
SYSTOLIC BLOOD PRESSURE: 150 MMHG | DIASTOLIC BLOOD PRESSURE: 81 MMHG | HEIGHT: 67 IN | BODY MASS INDEX: 45.99 KG/M2 | HEART RATE: 84 BPM | WEIGHT: 293 LBS

## 2022-01-25 DIAGNOSIS — M25.562 PAIN IN LEFT KNEE: ICD-10-CM

## 2022-01-25 DIAGNOSIS — Z86.39 PERSONAL HISTORY OF OTHER ENDOCRINE, NUTRITIONAL AND METABOLIC DISEASE: ICD-10-CM

## 2022-01-25 PROCEDURE — 73562 X-RAY EXAM OF KNEE 3: CPT | Mod: LT

## 2022-01-25 PROCEDURE — 20610 DRAIN/INJ JOINT/BURSA W/O US: CPT | Mod: LT

## 2022-01-25 PROCEDURE — 99203 OFFICE O/P NEW LOW 30 MIN: CPT | Mod: 25

## 2022-01-25 RX ORDER — LIDOCAINE HYDROCHLORIDE 10 MG/ML
1 INJECTION, SOLUTION INFILTRATION; PERINEURAL
Refills: 0 | Status: COMPLETED | OUTPATIENT
Start: 2022-01-25

## 2022-01-25 RX ORDER — METHYLPRED ACET/NACL,ISO-OS/PF 80 MG/ML
80 VIAL (ML) INJECTION
Qty: 1 | Refills: 0 | Status: COMPLETED | OUTPATIENT
Start: 2022-01-25

## 2022-01-25 RX ADMIN — METHYLPREDNISOLONE ACETATE 1 MG/ML: 80 INJECTION, SUSPENSION INTRA-ARTICULAR; INTRALESIONAL; INTRAMUSCULAR; SOFT TISSUE at 00:00

## 2022-01-25 RX ADMIN — LIDOCAINE HYDROCHLORIDE 3 %: 10 INJECTION, SOLUTION INFILTRATION; PERINEURAL at 00:00

## 2022-02-14 ENCOUNTER — APPOINTMENT (OUTPATIENT)
Dept: ORTHOPEDIC SURGERY | Facility: CLINIC | Age: 57
End: 2022-02-14
Payer: COMMERCIAL

## 2022-02-14 PROCEDURE — 20610 DRAIN/INJ JOINT/BURSA W/O US: CPT | Mod: LT

## 2022-02-14 RX ADMIN — LIDOCAINE HYDROCHLORIDE 5 %: 10 INJECTION, SOLUTION INFILTRATION; PERINEURAL at 00:00

## 2022-02-14 RX ADMIN — Medication 0 MG/3ML: at 00:00

## 2022-02-14 NOTE — PROCEDURE
[Injection] : Injection [Left] : of the left [Knee Joint] : knee joint [Osteoarthritis] : Osteoarthritis [Joint Pain] : Joint pain [Risk] : risk [Patient] : patient [Benefits] : benefits [Alternatives] : alternatives [Bleeding] : bleeding [Infection] : infection [Allergic Reaction] : allergic reaction [Verbal Consent Obtained] : verbal consent was obtained prior to the procedure [Ethyl Chloride Spray] : ethyl chloride spray was used as a topical anesthetic [___mL] : [unfilled] ~UmL of lidocaine [1%] : 1% lidocaine [Without Epi] : without epinephrine [Needle Gauge___] : Local anesthetic was administered using a [unfilled]-gauge needle [Lateral] : lateral [Superior] : superior [20] : a 20-gauge [Bandage Applied] : a bandage [Tolerated Well] : The patient tolerated the procedure well [None] : none [No Strenuous Activity___day(s)] : avoid strenuous activity for [unfilled] day(s) [PRN] : as needed [FreeTextEntry1] : chloraprep [FreeTextEntry8] : Jacobo

## 2022-02-14 NOTE — REASON FOR VISIT
[Follow-Up Visit] : a follow-up visit for [Knee Pain] : knee pain [FreeTextEntry2] : Left knee Durolane injection today

## 2022-02-15 RX ORDER — LIDOCAINE HYDROCHLORIDE 10 MG/ML
1 INJECTION, SOLUTION INFILTRATION; PERINEURAL
Refills: 0 | Status: COMPLETED | OUTPATIENT
Start: 2022-02-14

## 2022-02-15 RX ORDER — HYALURONATE SODIUM, STABILIZED 60 MG/3 ML
60 SYRINGE (ML) INTRAARTICULAR
Refills: 0 | Status: COMPLETED | OUTPATIENT
Start: 2022-02-14

## 2022-04-12 ENCOUNTER — RX RENEWAL (OUTPATIENT)
Age: 57
End: 2022-04-12

## 2022-04-13 ENCOUNTER — APPOINTMENT (OUTPATIENT)
Dept: INTERNAL MEDICINE | Facility: CLINIC | Age: 57
End: 2022-04-13
Payer: COMMERCIAL

## 2022-04-13 PROCEDURE — 99213 OFFICE O/P EST LOW 20 MIN: CPT | Mod: 95

## 2022-04-13 NOTE — HISTORY OF PRESENT ILLNESS
[Home] : at home, [unfilled] , at the time of the visit. [Medical Office: ([facility])___] : at the medical office located in  [Verbal consent obtained from patient] : the patient, [unfilled] [de-identified] : Pt asks for teb visit for med renewal  Had gastric sleeve but is stagnant at weight loss right now

## 2022-06-07 ENCOUNTER — APPOINTMENT (OUTPATIENT)
Dept: ORTHOPEDIC SURGERY | Facility: CLINIC | Age: 57
End: 2022-06-07

## 2022-06-07 VITALS
WEIGHT: 293 LBS | BODY MASS INDEX: 45.99 KG/M2 | SYSTOLIC BLOOD PRESSURE: 148 MMHG | HEIGHT: 67 IN | DIASTOLIC BLOOD PRESSURE: 93 MMHG | HEART RATE: 69 BPM

## 2022-06-07 PROCEDURE — 99212 OFFICE O/P EST SF 10 MIN: CPT | Mod: 25

## 2022-06-07 PROCEDURE — 73562 X-RAY EXAM OF KNEE 3: CPT | Mod: RT

## 2022-06-07 PROCEDURE — 20610 DRAIN/INJ JOINT/BURSA W/O US: CPT | Mod: RT

## 2022-06-07 RX ORDER — METHYLPRED ACET/NACL,ISO-OS/PF 80 MG/ML
80 VIAL (ML) INJECTION
Qty: 1 | Refills: 0 | Status: COMPLETED | OUTPATIENT
Start: 2022-06-07

## 2022-06-07 RX ORDER — LIDOCAINE HYDROCHLORIDE 10 MG/ML
1 INJECTION, SOLUTION INFILTRATION; PERINEURAL
Refills: 0 | Status: COMPLETED | OUTPATIENT
Start: 2022-06-07

## 2022-06-07 RX ADMIN — LIDOCAINE HYDROCHLORIDE 3 %: 10 INJECTION, SOLUTION INFILTRATION; PERINEURAL at 00:00

## 2022-06-07 RX ADMIN — METHYLPREDNISOLONE ACETATE 1 MG/ML: 80 INJECTION, SUSPENSION INTRA-ARTICULAR; INTRALESIONAL; INTRAMUSCULAR; SOFT TISSUE at 00:00

## 2022-07-06 ENCOUNTER — APPOINTMENT (OUTPATIENT)
Dept: BARIATRICS | Facility: HOSPITAL | Age: 57
End: 2022-07-06

## 2022-07-18 ENCOUNTER — APPOINTMENT (OUTPATIENT)
Dept: BARIATRICS | Facility: CLINIC | Age: 57
End: 2022-07-18

## 2022-07-18 ENCOUNTER — APPOINTMENT (OUTPATIENT)
Dept: INTERNAL MEDICINE | Facility: CLINIC | Age: 57
End: 2022-07-18

## 2022-07-18 VITALS
HEIGHT: 65.5 IN | WEIGHT: 293 LBS | DIASTOLIC BLOOD PRESSURE: 84 MMHG | BODY MASS INDEX: 48.23 KG/M2 | OXYGEN SATURATION: 75 % | TEMPERATURE: 97.2 F | SYSTOLIC BLOOD PRESSURE: 120 MMHG | HEART RATE: 96 BPM

## 2022-07-18 DIAGNOSIS — Z92.29 PERSONAL HISTORY OF OTHER DRUG THERAPY: ICD-10-CM

## 2022-07-18 DIAGNOSIS — Z13.220 ENCOUNTER FOR SCREENING FOR LIPOID DISORDERS: ICD-10-CM

## 2022-07-18 PROCEDURE — 99205 OFFICE O/P NEW HI 60 MIN: CPT

## 2022-07-18 PROCEDURE — 99402 PREV MED CNSL INDIV APPRX 30: CPT | Mod: 25

## 2022-07-18 PROCEDURE — 99213 OFFICE O/P EST LOW 20 MIN: CPT | Mod: 95

## 2022-07-18 RX ORDER — PANTOPRAZOLE 40 MG/1
40 TABLET, DELAYED RELEASE ORAL
Qty: 90 | Refills: 1 | Status: DISCONTINUED | COMMUNITY
Start: 2020-11-30 | End: 2022-07-18

## 2022-07-18 NOTE — HISTORY OF PRESENT ILLNESS
[Home] : at home, [unfilled] , at the time of the visit. [Medical Office: ([facility])___] : at the medical office located in  [Verbal consent obtained from patient] : the patient, [unfilled] [de-identified] : Pt asks for teb for med renewal  Pt trying to get help with eating .

## 2022-07-18 NOTE — HISTORY OF PRESENT ILLNESS
[de-identified] : 56-year-old woman with longstanding history of morbid obesity status post laparoscopic sleeve gastrectomy in November 2020 by Dr. Candelaria in [place].  Patient presents today after having initial inadequate weight loss and weight regain.  Initial weight 365, lowest weight 323 and current weight 328.  Patient feels her immobility has significantly contributed to her poor weight loss.  Upon dietary recall patient tends to eat mostly takeout food skipping breakfast having a late lunch afternoon snack and dinner at 8 PM.  Patient drinks mostly zero-calorie liquids.  Works 10 to 12 hours today a day and does minimal exercise due to both to time constraints and osteoarthritis.  Patient swims 1 day/week. patient states that she has had 1 hip replaced and needs the other hip and both needs replaced.  Patient is a current smoker and utilizes NSAIDs for arthritis pain.  Patient has utilized weight loss medications with success in the past but none since surgery.  Patient is interested in losing more weight. [Procedure: ___] : Procedure performed: [unfilled]  [Date of Surgery: ___] : Date of Surgery:   [unfilled] [Surgeon Name:   ___] : Surgeon Name: Dr. MORALEZ [Pre-Op Weight ___] : Pre-op weight was [unfilled] lbs

## 2022-07-18 NOTE — ASSESSMENT
[FreeTextEntry1] : 56-year-old woman with longstanding history of morbid obesity status post laparoscopic sleeve gastrectomy in November 2020 by Dr. Candelaria in [place].  Patient presents today after having initial inadequate weight loss and weight regain.  Patient wishes to lose more weight.\par \par Nutrition and exercise guidelines were extensively reviewed with the patient.  Consuming 3 protein focus meals per day, eating meals earlier in the day, avoiding snacking, consuming adequate water.  Increase activity with short walks and track steps.  Importance of adequate sleep and stress reduction was stressed with the patient as patient tends to be an emotional eater.  Also encouraged patient to prepare meals as this will reduce calorie intake.  Overall 30 minutes of counseling.\par \par Nutrition evaluation\par Psych evaluation\par Dietary changes specifically preparing food rather than takeout and eating earlier in the day\par Consider referral to obesity medicine for weight loss medications\par Obtain upper GI to assess sleeve anatomy\par Routine labs with vitamin levels\par Given written material about surgical options\par Smoking cessation\par Increase daily activity, track steps\par All questions answered\par Follow-up in 2 months\par \par Call with any questions or concerns\par \par

## 2022-07-18 NOTE — PHYSICAL EXAM
[Obese, well nourished, in no acute distress] : obese, well nourished, in no acute distress [Normal] : affect appropriate [de-identified] : obese, soft, nontender, no evidence of hernia

## 2022-08-02 LAB
25(OH)D3 SERPL-MCNC: 29.4 NG/ML
ALBUMIN SERPL ELPH-MCNC: 4.6 G/DL
ALP BLD-CCNC: 116 U/L
ALT SERPL-CCNC: 20 U/L
ANION GAP SERPL CALC-SCNC: 14 MMOL/L
APTT BLD: 33 SEC
AST SERPL-CCNC: 19 U/L
BASOPHILS # BLD AUTO: 0.07 K/UL
BASOPHILS NFR BLD AUTO: 0.6 %
BILIRUB SERPL-MCNC: 0.2 MG/DL
BUN SERPL-MCNC: 17 MG/DL
CALCIUM SERPL-MCNC: 10 MG/DL
CHLORIDE SERPL-SCNC: 100 MMOL/L
CHOLEST SERPL-MCNC: 205 MG/DL
CO2 SERPL-SCNC: 27 MMOL/L
CREAT SERPL-MCNC: 0.78 MG/DL
EGFR: 88 ML/MIN/1.73M2
EOSINOPHIL # BLD AUTO: 0.29 K/UL
EOSINOPHIL NFR BLD AUTO: 2.6 %
ESTIMATED AVERAGE GLUCOSE: 111 MG/DL
FERRITIN SERPL-MCNC: 183 NG/ML
FOLATE SERPL-MCNC: >20 NG/ML
GLUCOSE SERPL-MCNC: 67 MG/DL
HBA1C MFR BLD HPLC: 5.5 %
HCT VFR BLD CALC: 45.2 %
HDLC SERPL-MCNC: 62 MG/DL
HGB BLD-MCNC: 15 G/DL
IMM GRANULOCYTES NFR BLD AUTO: 0.4 %
INR PPP: 0.94 RATIO
IRON SATN MFR SERPL: 22 %
IRON SERPL-MCNC: 69 UG/DL
LDLC SERPL CALC-MCNC: 88 MG/DL
LYMPHOCYTES # BLD AUTO: 2.4 K/UL
LYMPHOCYTES NFR BLD AUTO: 21.5 %
MAN DIFF?: NORMAL
MCHC RBC-ENTMCNC: 31.3 PG
MCHC RBC-ENTMCNC: 33.2 GM/DL
MCV RBC AUTO: 94.4 FL
MONOCYTES # BLD AUTO: 0.79 K/UL
MONOCYTES NFR BLD AUTO: 7.1 %
NEUTROPHILS # BLD AUTO: 7.57 K/UL
NEUTROPHILS NFR BLD AUTO: 67.8 %
NONHDLC SERPL-MCNC: 143 MG/DL
PLATELET # BLD AUTO: 299 K/UL
POTASSIUM SERPL-SCNC: 5.7 MMOL/L
PROT SERPL-MCNC: 6.8 G/DL
PT BLD: 10.9 SEC
RBC # BLD: 4.79 M/UL
RBC # FLD: 13.3 %
SODIUM SERPL-SCNC: 141 MMOL/L
TIBC SERPL-MCNC: 311 UG/DL
TRIGL SERPL-MCNC: 276 MG/DL
TSH SERPL-ACNC: 1.66 UIU/ML
UIBC SERPL-MCNC: 242 UG/DL
VIT B12 SERPL-MCNC: 760 PG/ML
WBC # FLD AUTO: 11.16 K/UL

## 2022-08-08 LAB
VIT A SERPL-MCNC: 52.6 UG/DL
VIT B1 SERPL-MCNC: 166.9 NMOL/L
ZINC SERPL-MCNC: 77 UG/DL

## 2022-08-17 ENCOUNTER — OUTPATIENT (OUTPATIENT)
Dept: OUTPATIENT SERVICES | Facility: HOSPITAL | Age: 57
LOS: 1 days | End: 2022-08-17
Payer: COMMERCIAL

## 2022-08-17 DIAGNOSIS — Z98.890 OTHER SPECIFIED POSTPROCEDURAL STATES: Chronic | ICD-10-CM

## 2022-08-17 DIAGNOSIS — Z98.84 BARIATRIC SURGERY STATUS: ICD-10-CM

## 2022-08-17 DIAGNOSIS — Z90.89 ACQUIRED ABSENCE OF OTHER ORGANS: Chronic | ICD-10-CM

## 2022-08-17 PROCEDURE — 74240 X-RAY XM UPR GI TRC 1CNTRST: CPT | Mod: 26

## 2022-08-17 PROCEDURE — 74240 X-RAY XM UPR GI TRC 1CNTRST: CPT

## 2022-08-22 ENCOUNTER — APPOINTMENT (OUTPATIENT)
Dept: INTERNAL MEDICINE | Facility: CLINIC | Age: 57
End: 2022-08-22

## 2022-09-07 ENCOUNTER — APPOINTMENT (OUTPATIENT)
Dept: BARIATRICS/WEIGHT MGMT | Facility: CLINIC | Age: 57
End: 2022-09-07

## 2022-09-07 DIAGNOSIS — Z78.9 OTHER SPECIFIED HEALTH STATUS: ICD-10-CM

## 2022-09-07 PROCEDURE — 90791 PSYCH DIAGNOSTIC EVALUATION: CPT | Mod: 95

## 2022-10-14 ENCOUNTER — NON-APPOINTMENT (OUTPATIENT)
Age: 57
End: 2022-10-14

## 2022-10-17 ENCOUNTER — APPOINTMENT (OUTPATIENT)
Dept: INTERNAL MEDICINE | Facility: CLINIC | Age: 57
End: 2022-10-17

## 2022-10-17 PROCEDURE — 99213 OFFICE O/P EST LOW 20 MIN: CPT | Mod: 95

## 2022-10-17 NOTE — HISTORY OF PRESENT ILLNESS
[Home] : at home, [unfilled] , at the time of the visit. [Medical Office: ([facility])___] : at the medical office located in  [Verbal consent obtained from patient] : the patient, [unfilled] [de-identified] : Pt asks for teb for med renewal  Feels well on curent meds

## 2022-10-17 NOTE — PHYSICAL EXAM
[No Acute Distress] : no acute distress [Normal Sclera/Conjunctiva] : normal sclera/conjunctiva [Normal Outer Ear/Nose] : the outer ears and nose were normal in appearance [No JVD] : no jugular venous distention [No Respiratory Distress] : no respiratory distress  [Memory Grossly Normal] : memory grossly normal [Normal Affect] : the affect was normal [Normal Insight/Judgement] : insight and judgment were intact

## 2022-10-26 ENCOUNTER — APPOINTMENT (OUTPATIENT)
Dept: BARIATRICS | Facility: CLINIC | Age: 57
End: 2022-10-26

## 2022-10-26 ENCOUNTER — NON-APPOINTMENT (OUTPATIENT)
Age: 57
End: 2022-10-26

## 2022-10-26 VITALS
WEIGHT: 293 LBS | HEIGHT: 65.5 IN | OXYGEN SATURATION: 97 % | HEART RATE: 90 BPM | TEMPERATURE: 96.7 F | BODY MASS INDEX: 48.23 KG/M2 | SYSTOLIC BLOOD PRESSURE: 120 MMHG | DIASTOLIC BLOOD PRESSURE: 80 MMHG

## 2022-10-26 PROCEDURE — 99214 OFFICE O/P EST MOD 30 MIN: CPT

## 2022-10-26 RX ORDER — HYALURONATE SODIUM, STABILIZED 60 MG/3 ML
60 SYRINGE (ML) INTRAARTICULAR
Qty: 1 | Refills: 0 | Status: DISCONTINUED | OUTPATIENT
Start: 2022-01-25 | End: 2022-10-26

## 2022-10-26 RX ORDER — NEOMYCIN SULFATE, POLYMYXIN B SULFATE AND HYDROCORTISONE 3.5; 10000; 1 MG/ML; [USP'U]/ML; MG/ML
3.5-10000-1 SUSPENSION OPHTHALMIC EVERY 4 HOURS
Qty: 1 | Refills: 0 | Status: DISCONTINUED | COMMUNITY
Start: 2020-05-18 | End: 2022-10-26

## 2022-10-26 RX ORDER — MINOCYCLINE HYDROCHLORIDE 50 MG/1
50 CAPSULE ORAL
Qty: 30 | Refills: 0 | Status: DISCONTINUED | COMMUNITY
Start: 2022-01-24 | End: 2022-10-26

## 2022-11-07 ENCOUNTER — APPOINTMENT (OUTPATIENT)
Dept: BARIATRICS/WEIGHT MGMT | Facility: CLINIC | Age: 57
End: 2022-11-07

## 2022-11-07 VITALS — WEIGHT: 293 LBS | BODY MASS INDEX: 53.92 KG/M2

## 2022-11-07 DIAGNOSIS — Z13.0 ENCOUNTER FOR SCREENING FOR DISEASES OF THE BLOOD AND BLOOD-FORMING ORGANS AND CERTAIN DISORDERS INVOLVING THE IMMUNE MECHANISM: ICD-10-CM

## 2022-11-07 DIAGNOSIS — Z87.898 PERSONAL HISTORY OF OTHER SPECIFIED CONDITIONS: ICD-10-CM

## 2022-11-07 DIAGNOSIS — Z02.83 ENCOUNTER FOR BLOOD-ALCOHOL AND BLOOD-DRUG TEST: ICD-10-CM

## 2022-11-07 DIAGNOSIS — Z13.228 ENCOUNTER FOR SCREENING FOR OTHER METABOLIC DISORDERS: ICD-10-CM

## 2022-11-07 DIAGNOSIS — I10 ESSENTIAL (PRIMARY) HYPERTENSION: ICD-10-CM

## 2022-11-07 DIAGNOSIS — Z13.228 ENCOUNTER FOR SCREENING FOR OTHER SUSPECTED ENDOCRINE DISORDER: ICD-10-CM

## 2022-11-07 DIAGNOSIS — Z13.29 ENCOUNTER FOR SCREENING FOR OTHER SUSPECTED ENDOCRINE DISORDER: ICD-10-CM

## 2022-11-07 DIAGNOSIS — Z13.0 ENCOUNTER FOR SCREENING FOR OTHER SUSPECTED ENDOCRINE DISORDER: ICD-10-CM

## 2022-11-07 DIAGNOSIS — Z01.818 ENCOUNTER FOR OTHER PREPROCEDURAL EXAMINATION: ICD-10-CM

## 2022-11-07 DIAGNOSIS — R73.9 HYPERGLYCEMIA, UNSPECIFIED: ICD-10-CM

## 2022-11-07 DIAGNOSIS — M76.60 ACHILLES TENDINITIS, UNSPECIFIED LEG: ICD-10-CM

## 2022-11-07 PROCEDURE — 99205 OFFICE O/P NEW HI 60 MIN: CPT | Mod: 95

## 2022-11-07 NOTE — ASSESSMENT
[FreeTextEntry1] : 56 y.o F with Class 3 obesity, +sleeve 2020 Nov, anxiety - sees Dr. rincon, OA, hypoTSH, presents for weight management\par \par - will email some handouts re: post op cristobal eating\par - wants to try phentermine - had worked in the past - discussed side effects including anxiety, jitteriness, dry mouth, constipation. patient doesn't remember these side effects when taking before\par - start phentermine 1/2 tab, incr to 1 tab after 1 week\par - discussed having breakfast daily\par - discussed on incr food items in pantry, freezer to have available when she gets home, pre- made, or oven ready\par - see neurologist for leg numbness - important for function, alia since she notices numbness getting worse\par \par f/u 5-8 weeks

## 2022-11-07 NOTE — REASON FOR VISIT
[Home] : at home, [unfilled] , at the time of the visit. [Medical Office: ([facility])___] : at the medical office located in  [Initial Evaluation] : an initial evaluation

## 2022-11-07 NOTE — REVIEW OF SYSTEMS
[Negative] : Respiratory [MED-ROS-Musclo-FT] : back pains [MED-ROS-Neuro-FT] : numbness in legs calves down

## 2022-11-21 ENCOUNTER — APPOINTMENT (OUTPATIENT)
Dept: BARIATRICS/WEIGHT MGMT | Facility: CLINIC | Age: 57
End: 2022-11-21

## 2022-11-21 PROCEDURE — 90834 PSYTX W PT 45 MINUTES: CPT | Mod: 95

## 2022-11-21 NOTE — REASON FOR VISIT
[Morbid Obesity (BMI>40)] : morbid obesity (bmi>40) [S/P Bariatric Surgery] : s/p bariatric surgery [Other Location: e.g. School (Enter Location, City,State)___] : at [unfilled], at the time of the visit. [Other Location: e.g. Home (Enter Location, City,State)___] : at [unfilled] [Patient] : the patient [Patient Understands Data May be Shared] : patient understands that the information discussed during the evaluation would be shared with referring provider and possibly with ~his/her~ insurance provider [Follow-Up Visit] : a follow-up visit for [de-identified] : Confidentiality and its limitations were explained.

## 2022-11-21 NOTE — DISCUSSION/SUMMARY
[Behavior plan developed] : Behavior plan developed [Strategies to improve adherence identified] : Strategies to improve adherence identified [Identify, practice refine strategies to promote adherence to regimen] : Identify, practice refine strategies to promote adherence to regimen [Coping skills training to overcome social/emotional barriers to adherence] : Coping skills training to overcome social/emotional barriers to adherence [Conventional grooming and hygiene] : Conventional grooming and hygiene [Calm, cooperative] : Calm, cooperative [No unusual behaviors, movements, etc.] : No unusual behaviors, movements, etc. [Normal Rate/Tone/Volume] : Normal Rate/Tone/Volume [Logical, Goal Directed] : Logical, Goal Directed [FreeTextEntry6] : stressed [de-identified] : inconsistent [de-identified] : emotional eating with ongoing ROCÍO sxs [FreeTextEntry1] : 56 yr old female who had sleeve gastrectomy in 2020 and reportedly lost 50-60 lbs and re-gained 20 lbs seeking weight loss counseling. [de-identified] : Psychological factors (physical inactivity, poor dietary choices) affecting other medical conditions (obesity and associated medical sequelae)\par Obesity\par ROCÍO [FreeTextEntry3] : Encouraged pt to reconsider her PCP's recommendation re: SSRI medication for treatment of her anxiety, especially given continued high frequency of Xanax use.  Also recommended CBT for treatment of anxiety. [FreeTextEntry4] : Follow up with writer x 2-4 weeks.  Referral to [facility] Obesity Med physician.  \par -self monitor all food intake and contextual factors\par - purchase and prepare her own food [FreeTextEntry5] : compliance with dietary and behavioral recommendations

## 2022-11-30 ENCOUNTER — APPOINTMENT (OUTPATIENT)
Dept: INTERNAL MEDICINE | Facility: CLINIC | Age: 57
End: 2022-11-30

## 2022-11-30 VITALS
BODY MASS INDEX: 48.23 KG/M2 | HEIGHT: 65.5 IN | TEMPERATURE: 98 F | WEIGHT: 293 LBS | OXYGEN SATURATION: 96 % | HEART RATE: 107 BPM

## 2022-11-30 VITALS — SYSTOLIC BLOOD PRESSURE: 128 MMHG | DIASTOLIC BLOOD PRESSURE: 82 MMHG

## 2022-11-30 PROCEDURE — 90686 IIV4 VACC NO PRSV 0.5 ML IM: CPT

## 2022-11-30 PROCEDURE — 99213 OFFICE O/P EST LOW 20 MIN: CPT | Mod: 25

## 2022-11-30 PROCEDURE — 90471 IMMUNIZATION ADMIN: CPT

## 2022-11-30 NOTE — REVIEW OF SYSTEMS
[Joint Pain] : joint pain [Joint Stiffness] : joint stiffness [Back Pain] : back pain [Fever] : no fever [Chills] : no chills [Chest Pain] : no chest pain [Palpitations] : no palpitations [Lower Ext Edema] : no lower extremity edema [Shortness Of Breath] : no shortness of breath [Wheezing] : no wheezing [Cough] : no cough [Abdominal Pain] : no abdominal pain [Dizziness] : no dizziness [de-identified] : neuropathy feet

## 2022-11-30 NOTE — PHYSICAL EXAM
[No Acute Distress] : no acute distress [Normal Sclera/Conjunctiva] : normal sclera/conjunctiva [Normal Outer Ear/Nose] : the outer ears and nose were normal in appearance [No JVD] : no jugular venous distention [No Respiratory Distress] : no respiratory distress  [No Accessory Muscle Use] : no accessory muscle use [Clear to Auscultation] : lungs were clear to auscultation bilaterally [Normal Rate] : normal rate  [Regular Rhythm] : with a regular rhythm [Soft] : abdomen soft [Non Tender] : non-tender [Non-distended] : non-distended [Grossly Normal Strength/Tone] : grossly normal strength/tone [Memory Grossly Normal] : memory grossly normal [Normal Affect] : the affect was normal [Normal Mood] : the mood was normal [Normal Insight/Judgement] : insight and judgment were intact [de-identified] : rt hip indentation in skin

## 2022-11-30 NOTE — HEALTH RISK ASSESSMENT
[0] : 2) Feeling down, depressed, or hopeless: Not at all (0) [PHQ-2 Negative - No further assessment needed] : PHQ-2 Negative - No further assessment needed [[id number]] : 0

## 2022-11-30 NOTE — HISTORY OF PRESENT ILLNESS
[de-identified] : Pt comes for med renewal and discussion about weight loss efforts  PT aslo having low back pain and has not had epidural injection in more than a year   Needs new MRI and she will be contacting  Dr Stephenson. Needs flu vaccine  UTD on covid vaccine

## 2022-12-06 ENCOUNTER — APPOINTMENT (OUTPATIENT)
Dept: ORTHOPEDIC SURGERY | Facility: CLINIC | Age: 57
End: 2022-12-06
Payer: COMMERCIAL

## 2022-12-06 VITALS
HEART RATE: 98 BPM | DIASTOLIC BLOOD PRESSURE: 78 MMHG | WEIGHT: 293 LBS | BODY MASS INDEX: 48.23 KG/M2 | HEIGHT: 65.5 IN | SYSTOLIC BLOOD PRESSURE: 124 MMHG

## 2022-12-06 PROCEDURE — XXXXX: CPT | Mod: 1L

## 2022-12-06 NOTE — PHYSICAL EXAM
[Antalgic] : antalgic [Wide-Based] : wide-based [Shuffling] : shuffling [LE] : Sensory: Intact in bilateral lower extremities [DP] : dorsalis pedis 2+ and symmetric bilaterally [PT] : posterior tibial 2+ and symmetric bilaterally [Normal RLE] : Right Lower Extremity: No scars, rashes, lesions, ulcers, skin intact [Normal LLE] : Left Lower Extremity: No scars, rashes, lesions, ulcers, skin intact [Normal Touch] : sensation intact for touch [Normal] : no peripheral adenopathy appreciated [de-identified] : 57-year-old woman with a BMI greater than 50 here today because of bilateral knee pain and a concern because of a slight indentation over the right hip wound.  Patient is almost due for the 6-month increment for Durolane injections for both of her knees.  Patient requests consideration of cortisone injections in both knees today [de-identified] : AP pelvis and lateral of the right hip shows a well-positioned well fit total hip replacement in excellent position and alignment there were no changes from previous x-rays there is no subsidence.\par 3 views of both knees done in the office today demonstrate end-stage DJD with significant osteophyte formation both medial and lateral at the joint line bone-on-bone appearance of both medial and lateral compartments with lateralization of the tibia under the femur grade 2-3 over 5 bilaterally.\par [place] view shows severe wear of the lateral facet of both patella with lateralization and enhanced wear of the lateral portion of the patella.  There is large amounts of bone debris adjacent to the trochlear groove bilaterally\par

## 2022-12-06 NOTE — REASON FOR VISIT
[Follow-Up Visit] : a follow-up visit for [Artificial Hip Joint] : an artificial hip joint [Hip Pain] : hip pain [Knee Pain] : knee pain [Osteoarthritis, Knee] : osteoarthritis of the knee [FreeTextEntry2] : Right total hip replacement 11/15/17

## 2022-12-06 NOTE — HISTORY OF PRESENT ILLNESS
[de-identified] : Patient is being seen today in follow-up of a right total hip replacement done in 2017 as well as complaint of pain in bilateral knees.  Patient has been maintained on gel and steroid injections of intermittent mid neatly over the last several years patient desiring cortisone injections today while she loses weight in preparation for her knee replacements [Worsening] : worsening [5] : a current pain level of 5/10 [7] : a maximum pain level of 7/10 [Standing] : standing [Walking] : worsened by walking [Knee Flexion] : worsened with knee flexion [Knee Extension] : worsened with knee extension

## 2022-12-21 ENCOUNTER — APPOINTMENT (OUTPATIENT)
Dept: BARIATRICS/WEIGHT MGMT | Facility: CLINIC | Age: 57
End: 2022-12-21

## 2022-12-21 VITALS — WEIGHT: 293 LBS | BODY MASS INDEX: 52.61 KG/M2

## 2022-12-21 PROCEDURE — 99214 OFFICE O/P EST MOD 30 MIN: CPT | Mod: 95

## 2022-12-21 NOTE — REASON FOR VISIT
[Follow-Up] : a follow-up visit [Home] : at home, [unfilled] , at the time of the visit. [Medical Office: ([facility])___] : at the medical office located in

## 2022-12-21 NOTE — ASSESSMENT
[FreeTextEntry1] : 56 y.o F with Class 3 obesity, +sleeve 2020 Nov, anxiety - sees Dr. rincon, OA, hypoTSH, presents for weight management\par \par - continue phentermine, doing well - taking 1 tab\par - she's following up with neuropathy 2/2 sciatica, pinched nerve\par - discussed having breakfast daily\par - discussed on incr food items in pantry, freezer to have available when she gets home, pre- made, or oven ready\par - see neurologist for leg numbness - important for function, alia since she notices numbness getting worse\par \par f/u 5-8 weeks

## 2022-12-21 NOTE — PHYSICAL EXAM
[Obese, well nourished, in no acute distress] : obese, well nourished, in no acute distress [Normal] : PERRL, EOMI, no conjunctival injection, anicteric General

## 2022-12-21 NOTE — HISTORY OF PRESENT ILLNESS
[FreeTextEntry1] : Bariatric surgery history: sleeve Nov 2020 - Dr Candelaria\par Obesity co-morbidities:  insomnia, anxiety\par Comorbidities improved or resolved: none\par Anti-obesity medications: phentermine\par Obesity medication side effects: none\par \par PATIENT WAS NOTIFIED THAT ANYTHING WE DISCUSSED IN OUR MEETING TODAY MAY BE REFLECTED IN WRITING IN THE VISIT NOTE WHICH WILL BE AVAILABLE TO OTHER MEDICAL PROVIDERS TO REVIEW AS PART OF ROUTINE PATIENT CARE.  PATIENT VERBALLY AGREED. \par \par Ms. SIMIN DIA is a 57 year year old female who presents for evaluation and treatment of Class 3 obesity. \par \par Obesity related co -morbidities: insomnia, anxiety. also tobacco use. \par pre op 365 - Nov 2020. had sleeve\par Takes Lyrica \par Anxiety - PRN xanax 3-4 times a week\par Anxiety/depression - has seen a lot of therapist.  went through a lot of grief counseling. She struggles with change.  Even getting new shoes, or going to a new pharmacy for a covid shot, daily unknowns can cause her to be panicky.  Sees Dr. Tucker.\par \par Interim:\par - phentermine has helped, doing well. 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 \par \par Weight History:\par Lowest adult weight: unsure, size 14. \par Highest adult weight: 365 # preop\par \par Has lost 20 pounds over the past year.\par \par Obesity began in adulthood.  Weight gain has occurred with: Started gaining weight in late 20s, with father passed away. Went over 300#, size 18 or so. Got , and lost weight with working out, exercising in mid 30s.  Lost her daughter - late 30s, and went back up past 300#.  And  got sick - a few years ago, passed away. Had hip replacement - back surgery as well.  And knees - OA, numbness on calves down, and unsteady - pcp recommends neurology appointment, hasn't got around to making that\par \par These days, doing more housework - laundry, etc.\par \par Past weight loss attempts include: bariatric surgery. These have produced a maximum of 20 pounds of weight loss.  \par Anti-obesity medications in the past: fen-phen, phentermine - in her 30s. \par \par Reasons for desiring weight loss: health\par Perceived obstacles to losing weight: decr activity\par \par Sleep: 6 hours. 1230 am - 630 am.  No SYLWIA.  \par \par Has 2 regular meals a day. \par \par Diet history:\par wakes up at:\par B: 2 cups of coffee in the morning.  Or 1 egg\par L: sit at her desk - salad, or soup, or sandwich w low marcus bread - ham with mustard.  Or [employer] - 1/2 soup, half salad. no snacks at work\par D: 730 pm - drive thru on way home - grilled chicken without bun, or Miranda[employer] salads.  Or protein bowl from [employer].  fish and vegetables. or turkey meatballs.  Doesn't have much available in pantry, freezer. \par \par Likes to eat red kidney beans. not black beans. adds to salads\par \par snacks: no\par eating after dinner:  no but has late dinner\par overeating episodes: none\par \par Sodas/fast food/processed foods: take out or drive thru\par \par Water intake per day:  1-2L per day\par +crystal light - 1 gallon a day.  Mixes with sweetened iced tea and watered.  \par \par Physical activity:\par Patient enjoys: walking\par Current physical activity: limited - walking \par \par Habits patient would like to change: having snacks\par Level of interest in losing weight: 5/5\par Community support: limited\par \par Factors that have helped in the past with losing weight and keeping it off: phentermine\par \par

## 2023-01-06 ENCOUNTER — TRANSCRIPTION ENCOUNTER (OUTPATIENT)
Age: 58
End: 2023-01-06

## 2023-01-06 ENCOUNTER — OUTPATIENT (OUTPATIENT)
Dept: OUTPATIENT SERVICES | Facility: HOSPITAL | Age: 58
LOS: 1 days | End: 2023-01-06
Payer: COMMERCIAL

## 2023-01-06 DIAGNOSIS — Z20.828 CONTACT WITH AND (SUSPECTED) EXPOSURE TO OTHER VIRAL COMMUNICABLE DISEASES: ICD-10-CM

## 2023-01-06 DIAGNOSIS — Z98.890 OTHER SPECIFIED POSTPROCEDURAL STATES: Chronic | ICD-10-CM

## 2023-01-06 DIAGNOSIS — Z90.89 ACQUIRED ABSENCE OF OTHER ORGANS: Chronic | ICD-10-CM

## 2023-01-06 PROCEDURE — U0003: CPT

## 2023-01-06 PROCEDURE — U0005: CPT

## 2023-01-06 NOTE — ASU PATIENT PROFILE, ADULT - NSICDXPASTSURGICALHX_GEN_ALL_CORE_FT
PAST SURGICAL HISTORY:  S/P laminectomy T11-12, 2010    S/P T&A (status post tonsillectomy and adenoidectomy) age 19    S/P tendon repair right lower leg 25 years ago     PAST SURGICAL HISTORY:  H/O gastric sleeve     S/P laminectomy T11-12, 2010    S/P T&A (status post tonsillectomy and adenoidectomy) age 19    S/P tendon repair right lower leg 25 years ago

## 2023-01-06 NOTE — ASU PATIENT PROFILE, ADULT - NSICDXPASTMEDICALHX_GEN_ALL_CORE_FT
PAST MEDICAL HISTORY:  Anxiety     Cervical disc herniation unsure level    Goiter     Hypothyroid     Lumbar disc herniation L4-5, treated with epidural injections with Dr. Stephenson. Last done in 2016    Mononucleosis age 10    Morbid obesity     Neuropathy bilateral lower extremities    Osteoarthritis     Sedentary lifestyle     Tendon laceration right lower leg 25 years ago    URI (upper respiratory infection) started at [facility] on 10/30/17     PAST MEDICAL HISTORY:  Anxiety     Cervical disc herniation unsure level    Goiter     Hypothyroid     Lumbar disc herniation L4-5, treated with epidural injections with Dr. Stephenson. Last done in 2016    Mononucleosis age 10    Morbid obesity     Neuropathy bilateral lower extremities    Osteoarthritis     Peripheral neuropathy     Sedentary lifestyle     Tendon laceration right lower leg 25 years ago    URI (upper respiratory infection) started at [facility] on 10/30/17

## 2023-01-07 LAB — SARS-COV-2 RNA SPEC QL NAA+PROBE: SIGNIFICANT CHANGE UP

## 2023-01-09 ENCOUNTER — APPOINTMENT (OUTPATIENT)
Dept: ORTHOPEDIC SURGERY | Facility: HOSPITAL | Age: 58
End: 2023-01-09
Payer: COMMERCIAL

## 2023-01-09 ENCOUNTER — OUTPATIENT (OUTPATIENT)
Dept: OUTPATIENT SERVICES | Facility: HOSPITAL | Age: 58
LOS: 1 days | End: 2023-01-09
Payer: COMMERCIAL

## 2023-01-09 ENCOUNTER — APPOINTMENT (OUTPATIENT)
Dept: BARIATRICS/WEIGHT MGMT | Facility: CLINIC | Age: 58
End: 2023-01-09

## 2023-01-09 VITALS
TEMPERATURE: 99 F | WEIGHT: 293 LBS | SYSTOLIC BLOOD PRESSURE: 165 MMHG | DIASTOLIC BLOOD PRESSURE: 98 MMHG | HEIGHT: 66 IN | HEART RATE: 90 BPM | RESPIRATION RATE: 19 BRPM | OXYGEN SATURATION: 99 %

## 2023-01-09 VITALS
RESPIRATION RATE: 16 BRPM | SYSTOLIC BLOOD PRESSURE: 127 MMHG | HEART RATE: 76 BPM | DIASTOLIC BLOOD PRESSURE: 95 MMHG | OXYGEN SATURATION: 99 %

## 2023-01-09 DIAGNOSIS — Z90.3 ACQUIRED ABSENCE OF STOMACH [PART OF]: Chronic | ICD-10-CM

## 2023-01-09 DIAGNOSIS — Z98.890 OTHER SPECIFIED POSTPROCEDURAL STATES: Chronic | ICD-10-CM

## 2023-01-09 DIAGNOSIS — Z90.89 ACQUIRED ABSENCE OF OTHER ORGANS: Chronic | ICD-10-CM

## 2023-01-09 DIAGNOSIS — M54.16 RADICULOPATHY, LUMBAR REGION: ICD-10-CM

## 2023-01-09 PROCEDURE — 62323 NJX INTERLAMINAR LMBR/SAC: CPT

## 2023-01-09 PROCEDURE — 99072 ADDL SUPL MATRL&STAF TM PHE: CPT

## 2023-01-20 NOTE — PATIENT PROFILE ADULT. - NSALCOHOLFREQ_GEN_A_CORE_SD
2-4 times/mo Griseofulvin Pregnancy And Lactation Text: This medication is Pregnancy Category X and is known to cause serious birth defects. It is unknown if this medication is excreted in breast milk but breast feeding should be avoided.

## 2023-01-23 ENCOUNTER — APPOINTMENT (OUTPATIENT)
Dept: BARIATRICS/WEIGHT MGMT | Facility: CLINIC | Age: 58
End: 2023-01-23
Payer: COMMERCIAL

## 2023-01-23 VITALS — HEIGHT: 65.5 IN | BODY MASS INDEX: 48.23 KG/M2 | WEIGHT: 293 LBS

## 2023-01-23 PROBLEM — G62.9 POLYNEUROPATHY, UNSPECIFIED: Chronic | Status: ACTIVE | Noted: 2023-01-09

## 2023-01-23 PROCEDURE — 90834 PSYTX W PT 45 MINUTES: CPT | Mod: 95

## 2023-01-25 ENCOUNTER — APPOINTMENT (OUTPATIENT)
Dept: BARIATRICS/WEIGHT MGMT | Facility: CLINIC | Age: 58
End: 2023-01-25
Payer: COMMERCIAL

## 2023-01-25 VITALS — BODY MASS INDEX: 51.62 KG/M2 | WEIGHT: 293 LBS

## 2023-01-25 PROCEDURE — 99214 OFFICE O/P EST MOD 30 MIN: CPT | Mod: 95

## 2023-01-25 NOTE — HISTORY OF PRESENT ILLNESS
[FreeTextEntry1] : Bariatric surgery history: sleeve Nov 2020 - Dr Candelaria\par Obesity co-morbidities:  insomnia, anxiety\par Comorbidities improved or resolved: none\par Anti-obesity medications: phentermine\par Obesity medication side effects: none\par \par PATIENT WAS NOTIFIED THAT ANYTHING WE DISCUSSED IN OUR MEETING TODAY MAY BE REFLECTED IN WRITING IN THE VISIT NOTE WHICH WILL BE AVAILABLE TO OTHER MEDICAL PROVIDERS TO REVIEW AS PART OF ROUTINE PATIENT CARE.  PATIENT VERBALLY AGREED. \par \par Ms. SIMIN DIA is a 57 year year old female who presents for evaluation and treatment of Class 3 obesity. \par \par Obesity related co -morbidities: insomnia, anxiety. also tobacco use. \par pre op 365 - Nov 2020. had sleeve\par Takes Lyrica \par Anxiety - PRN xanax 3-4 times a week\par Anxiety/depression - has seen a lot of therapist.  went through a lot of grief counseling. She struggles with change.  Even getting new shoes, or going to a new pharmacy for a covid shot, daily unknowns can cause her to be panicky.  Sees Dr. Tucker.\par \par Interim:\par - phentermine has helped, doing well. Has lost 6# since last appt\par - having knee issues, ortho rec another gel shot.  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 \par \par Weight History:\par Lowest adult weight: unsure, size 14. \par Highest adult weight: 365# preop\par \par Has lost 20 pounds over the past year.\par \par Obesity began in adulthood.  Weight gain has occurred with: Started gaining weight in late 20s, with father passed away. Went over 300#, size 18 or so. Got , and lost weight with working out, exercising in mid 30s.  Lost her daughter - late 30s, and went back up past 300#.  And  got sick - a few years ago, passed away. Had hip replacement - back surgery as well.  And knees - OA, numbness on calves down, and unsteady - pcp recommends neurology appointment, hasn't got around to making that\par \par These days, doing more housework - laundry, etc.\par \par Past weight loss attempts include: bariatric surgery. These have produced a maximum of 20 pounds of weight loss.  \par Anti-obesity medications in the past: fen-phen, phentermine - in her 30s. \par \par Reasons for desiring weight loss: health\par Perceived obstacles to losing weight: decr activity\par \par Sleep: 6 hours. 1230 am - 630 am.  No SYLWIA.  \par \par Has 2 regular meals a day. \par \par Diet history:\par wakes up at:\par B: 2 cups of coffee in the morning.  Or 1 egg\par L: sit at her desk - salad, or soup, or sandwich w low marcus bread - ham with mustard.  Or [employer] - 1/2 soup, half salad. no snacks at work\par D: 730 pm - drive thru on way home - grilled chicken without bun, or Miranda[employer] salads.  Or protein bowl from [employer].  fish and vegetables. or turkey meatballs.  Doesn't have much available in pantry, freezer. \par \par Likes to eat red kidney beans. not black beans. adds to salads\par \par snacks: no\par eating after dinner:  no but has late dinner\par overeating episodes: none\par \par Sodas/fast food/processed foods: take out or drive thru\par \par Water intake per day:  1-2L per day\par +crystal light - 1 gallon a day.  Mixes with sweetened iced tea and watered.  \par \par Physical activity:\par Patient enjoys: walking\par Current physical activity: limited - walking \par \par Habits patient would like to change: having snacks\par Level of interest in losing weight: 5/5\par Community support: limited\par \par Factors that have helped in the past with losing weight and keeping it off: phentermine\par \par

## 2023-01-25 NOTE — REASON FOR VISIT
[Follow-Up] : a follow-up visit [Home] : at home, [unfilled] , at the time of the visit. [Medical Office: ([facility])___] : at the medical office located in

## 2023-01-25 NOTE — ASSESSMENT
[FreeTextEntry1] : 56 y.o F with Class 3 obesity, +sleeve 2020 Nov, anxiety - sees Dr. rincon, OA, hypoTSH, presents for weight management\par \par - continue phentermine, doing well - taking 1 tab\par - she's following up with neuropathy 2/2 sciatica, pinched nerve\par - discussed having breakfast daily\par - discussed on incr food items in pantry, freezer to have available when she gets home, pre- made, or oven ready\par - see neurologist for leg numbness - important for function, alia since she notices numbness getting worse\par \par f/u 6-8 weeks

## 2023-02-07 ENCOUNTER — APPOINTMENT (OUTPATIENT)
Dept: BARIATRICS | Facility: CLINIC | Age: 58
End: 2023-02-07
Payer: COMMERCIAL

## 2023-02-07 VITALS
HEIGHT: 65.5 IN | WEIGHT: 293 LBS | SYSTOLIC BLOOD PRESSURE: 130 MMHG | DIASTOLIC BLOOD PRESSURE: 94 MMHG | OXYGEN SATURATION: 99 % | BODY MASS INDEX: 48.23 KG/M2 | TEMPERATURE: 96.4 F | HEART RATE: 89 BPM

## 2023-02-07 DIAGNOSIS — E46 UNSPECIFIED PROTEIN-CALORIE MALNUTRITION: ICD-10-CM

## 2023-02-07 DIAGNOSIS — M25.551 PAIN IN RIGHT HIP: ICD-10-CM

## 2023-02-07 DIAGNOSIS — G89.29 PAIN IN RIGHT HIP: ICD-10-CM

## 2023-02-07 PROCEDURE — 99214 OFFICE O/P EST MOD 30 MIN: CPT

## 2023-02-07 RX ORDER — MULTIVITAMIN
TABLET,CHEWABLE ORAL TWICE DAILY
Refills: 0 | Status: ACTIVE | COMMUNITY

## 2023-02-07 RX ORDER — CALCIUM CARBONATE/VITAMIN D3 500 MG-2.5
TABLET,CHEWABLE ORAL DAILY
Refills: 0 | Status: ACTIVE | COMMUNITY

## 2023-02-07 RX ORDER — CHROMIUM 200 MCG
TABLET ORAL DAILY
Refills: 0 | Status: ACTIVE | COMMUNITY

## 2023-02-07 RX ORDER — POLYETHYLENE GLYCOL 3350 17 G/17G
17 POWDER, FOR SOLUTION ORAL
Qty: 1 | Refills: 3 | Status: DISCONTINUED | COMMUNITY
Start: 2023-02-07 | End: 2023-02-07

## 2023-02-08 NOTE — PHYSICAL EXAM
[Obese, well nourished, in no acute distress] : obese, well nourished, in no acute distress [Normal] : normal appearance, no rash, nodules, vesicles, ulcers, erythema [de-identified] : obese, soft, nontender, no evidence of hernia

## 2023-02-08 NOTE — HISTORY OF PRESENT ILLNESS
[Procedure: ___] : Procedure performed: [unfilled]  [Date of Surgery: ___] : Date of Surgery:   [unfilled] [Surgeon Name:   ___] : Surgeon Name: Dr. MORALEZ [Pre-Op Weight ___] : Pre-op weight was [unfilled] lbs [de-identified] : 57-year-old woman with longstanding history of morbid obesity status post laparoscopic sleeve gastrectomy in November 2020 by Dr. Candelaria in [place].  Patient presents today for follow up would like to lose more weight.    Patient has appointments with  psychologist, and obesity medicine in the near future.   Patient complains of constipation due to phentermine, taking Miralax as needed. Continues to take shots for arthritis needs bilateral knee replacements.  Patient continues to have limited mobility with bilateral knee pain.  Patient is currently eating 3 meals a day and trying to limit snacking. Drinking zero calorie 24-48 oz/ day. Sleeping well. Patient continues to smoke.

## 2023-02-08 NOTE — ASSESSMENT
[FreeTextEntry1] : \par 57-year-old woman with longstanding history of morbid obesity status post laparoscopic sleeve gastrectomy in November 2020 by Dr. Candelaria in [place].  Patient presents today for follow up would like to lose more weight, after having initial inadequate weight loss and subsequent weight regain.  \par \par \par Healthy eating and  exercise guidelines were extensively reviewed with the patient.  \par \par Nutrition/ Psych/ Obesity Medicine follow up \par Dietary changes specifically preparing food rather than takeout \par Increase daily activity , track steps\par Emphasised Smoking Cessation. \par Miralax for constipation- Coupons given to patient \par Follow up 3 months with labs\par All questions answered\par \par \par Call with any questions or concerns\par \par \par \par \par \par \par

## 2023-02-13 ENCOUNTER — APPOINTMENT (OUTPATIENT)
Dept: ORTHOPEDIC SURGERY | Facility: CLINIC | Age: 58
End: 2023-02-13
Payer: COMMERCIAL

## 2023-02-13 DIAGNOSIS — S83.8X1S SPRAIN OF OTHER SPECIFIED PARTS OF RIGHT KNEE, SEQUELA: ICD-10-CM

## 2023-02-13 PROCEDURE — 99213 OFFICE O/P EST LOW 20 MIN: CPT

## 2023-02-15 ENCOUNTER — APPOINTMENT (OUTPATIENT)
Dept: INTERNAL MEDICINE | Facility: CLINIC | Age: 58
End: 2023-02-15
Payer: COMMERCIAL

## 2023-02-15 PROCEDURE — 99213 OFFICE O/P EST LOW 20 MIN: CPT | Mod: 95

## 2023-02-15 NOTE — PHYSICAL EXAM
[No Acute Distress] : no acute distress [Normal Sclera/Conjunctiva] : normal sclera/conjunctiva [Normal Outer Ear/Nose] : the outer ears and nose were normal in appearance [No JVD] : no jugular venous distention [No Respiratory Distress] : no respiratory distress  [Speech Grossly Normal] : speech grossly normal [Normal Affect] : the affect was normal [Normal Mood] : the mood was normal

## 2023-02-15 NOTE — HISTORY OF PRESENT ILLNESS
[Home] : at home, [unfilled] , at the time of the visit. [Medical Office: ([facility])___] : at the medical office located in  [Verbal consent obtained from patient] : the patient, [unfilled] [de-identified] : Pt asks for teb for med renewal  Pt aníbal be coming in soon for FBW and exam  Pt feels well on current meds

## 2023-03-01 ENCOUNTER — APPOINTMENT (OUTPATIENT)
Dept: BARIATRICS/WEIGHT MGMT | Facility: CLINIC | Age: 58
End: 2023-03-01

## 2023-03-08 ENCOUNTER — APPOINTMENT (OUTPATIENT)
Dept: BARIATRICS/WEIGHT MGMT | Facility: CLINIC | Age: 58
End: 2023-03-08
Payer: COMMERCIAL

## 2023-03-08 VITALS — HEIGHT: 65.5 IN | WEIGHT: 293 LBS | BODY MASS INDEX: 48.23 KG/M2

## 2023-03-08 PROCEDURE — 90832 PSYTX W PT 30 MINUTES: CPT | Mod: 95

## 2023-03-08 NOTE — DISCUSSION/SUMMARY
[Conventional grooming and hygiene] : Conventional grooming and hygiene [Calm, cooperative] : Calm, cooperative [No unusual behaviors, movements, etc.] : No unusual behaviors, movements, etc. [Normal Rate/Tone/Volume] : Normal Rate/Tone/Volume [Normal Range] : Normal Range [Logical, Goal Directed] : Logical, Goal Directed [Behavior plan developed] : Behavior plan developed [Strategies to improve adherence identified] : Strategies to improve adherence identified [Identify, practice refine strategies to promote adherence to regimen] : Identify, practice refine strategies to promote adherence to regimen [Coping skills training to overcome social/emotional barriers to adherence] : Coping skills training to overcome social/emotional barriers to adherence [FreeTextEntry6] : stressed [de-identified] : inconsistent [de-identified] : emotional eating with ongoing ROCÍO sxs [FreeTextEntry1] : 56 yr old female who had sleeve gastrectomy in 2020 and reportedly lost 50-60 lbs and re-gained 20 lbs seeking weight loss counseling. [de-identified] : Psychological factors (physical inactivity, poor dietary choices) affecting other medical conditions (obesity and associated medical sequelae)\par Obesity\par ROCÍO [FreeTextEntry3] : Encouraged pt to reconsider her PCP's recommendation re: SSRI medication for treatment of her anxiety, especially given continued high frequency of Xanax use.  Also recommended CBT for treatment of anxiety. [FreeTextEntry4] : Follow up with writer x 4 weeks.  Cont with [facility] physician.  \par -self monitor all food intake and contextual factors\par - purchase and prepare her own food [FreeTextEntry5] : compliance with dietary and behavioral recommendations

## 2023-03-08 NOTE — REASON FOR VISIT
[Follow-Up Visit] : a follow-up visit for [Morbid Obesity (BMI>40)] : morbid obesity (bmi>40) [S/P Bariatric Surgery] : s/p bariatric surgery [Other Location: e.g. School (Enter Location, City,State)___] : at [unfilled], at the time of the visit. [Other Location: e.g. Home (Enter Location, City,State)___] : at [unfilled] [Patient] : the patient [Patient Understands Data May be Shared] : patient understands that the information discussed during the evaluation would be shared with referring provider and possibly with ~his/her~ insurance provider [de-identified] : Confidentiality and its limitations were explained.

## 2023-03-13 ENCOUNTER — APPOINTMENT (OUTPATIENT)
Dept: ORTHOPEDIC SURGERY | Facility: CLINIC | Age: 58
End: 2023-03-13
Payer: COMMERCIAL

## 2023-03-13 VITALS
BODY MASS INDEX: 48.23 KG/M2 | DIASTOLIC BLOOD PRESSURE: 78 MMHG | SYSTOLIC BLOOD PRESSURE: 132 MMHG | HEIGHT: 65.5 IN | HEART RATE: 89 BPM | WEIGHT: 293 LBS

## 2023-03-13 DIAGNOSIS — M16.11 UNILATERAL PRIMARY OSTEOARTHRITIS, RIGHT HIP: ICD-10-CM

## 2023-03-13 PROCEDURE — 20610 DRAIN/INJ JOINT/BURSA W/O US: CPT | Mod: 50

## 2023-03-13 NOTE — PROCEDURE
[Injection] : Injection [Bilateral] : of bilateral [Knee Joint] : knee joint [Osteoarthritis] : Osteoarthritis [Joint Pain] : Joint pain [Patient] : patient [Risk] : risk [Benefits] : benefits [Alternatives] : alternatives [Bleeding] : bleeding [Infection] : infection [Allergic Reaction] : allergic reaction [Verbal Consent Obtained] : verbal consent was obtained prior to the procedure [Ethyl Chloride Spray] : ethyl chloride spray was used as a topical anesthetic [Lateral] : lateral [Anterior] : anterior [1% Lidocaine___(mL)] : [unfilled] mL of 1% Lidocaine [Methylpred. 40mg/mL___(mL)] : [unfilled] mL of 40mg/mL methylprednisolone [Bandage Applied] : a bandage [Tolerated Well] : The patient tolerated the procedure well [None] : none [No Strenuous Activity___day(s)] : avoid strenuous activity for [unfilled] day(s) [FreeTextEntry1] : Chloraprep

## 2023-03-13 NOTE — REASON FOR VISIT
[Follow-Up Visit] : a follow-up visit for [Knee Pain] : knee pain [FreeTextEntry2] : Bilateral  knees pain ongoing

## 2023-03-13 NOTE — HISTORY OF PRESENT ILLNESS
[de-identified] : Bilateral knee pain not improving with an exercise regime patient here today for steroid injections in both knees.  Patient was offered Durolane viscosupplementation but she is unable to afford the co-pay with her insurance [Worsening] : worsening [Walking] : worsened by walking [Knee Flexion] : worsened with knee flexion [Knee Extension] : worsened with knee extension

## 2023-03-15 ENCOUNTER — APPOINTMENT (OUTPATIENT)
Dept: BARIATRICS/WEIGHT MGMT | Facility: CLINIC | Age: 58
End: 2023-03-15
Payer: COMMERCIAL

## 2023-03-15 VITALS — WEIGHT: 293 LBS | BODY MASS INDEX: 50.8 KG/M2

## 2023-03-15 PROCEDURE — 99214 OFFICE O/P EST MOD 30 MIN: CPT | Mod: 95

## 2023-03-15 NOTE — HISTORY OF PRESENT ILLNESS
[FreeTextEntry1] : Bariatric surgery history: sleeve Nov 2020 - Dr Candelaria\par Obesity co-morbidities:  insomnia, anxiety\par Comorbidities improved or resolved: none\par Anti-obesity medications: phentermine\par Obesity medication side effects: none\par \par PATIENT WAS NOTIFIED THAT ANYTHING WE DISCUSSED IN OUR MEETING TODAY MAY BE REFLECTED IN WRITING IN THE VISIT NOTE WHICH WILL BE AVAILABLE TO OTHER MEDICAL PROVIDERS TO REVIEW AS PART OF ROUTINE PATIENT CARE.  PATIENT VERBALLY AGREED. \par \par Ms. SIMIN DIA is a 57 year year old female who presents for evaluation and treatment of Class 3 obesity. \par \par Obesity related co -morbidities: insomnia, anxiety. also tobacco use. \par pre op 365 - Nov 2020. had sleeve\par Takes Lyrica \par Anxiety - PRN xanax 3-4 times a week\par Anxiety/depression - has seen a lot of therapist.  went through a lot of grief counseling. She struggles with change.  Even getting new shoes, or going to a new pharmacy for a covid shot, daily unknowns can cause her to be panicky.  Sees Dr. Tucker.\par \par Interim:\par - phentermine has helped, doing well. Has lost 6# since last appt\par - got cortisone shots\par - bringing lunch to work now\par - sister brought her  in home, using it more, no more jose donuts\par - drinking black coffee with 3 Splendas, no milk\par - will have hamburger tonight, no buns.  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 \par \par Weight History:\par Lowest adult weight: unsure, size 14. \par Highest adult weight: 365# preop\par \par Has lost 20 pounds over the past year.\par \par Obesity began in adulthood.  Weight gain has occurred with: Started gaining weight in late 20s, with father passed away. Went over 300#, size 18 or so. Got , and lost weight with working out, exercising in mid 30s.  Lost her daughter - late 30s, and went back up past 300#.  And  got sick - a few years ago, passed away. Had hip replacement - back surgery as well.  And knees - OA, numbness on calves down, and unsteady - pcp recommends neurology appointment, hasn't got around to making that\par \par These days, doing more housework - laundry, etc.\par \par Past weight loss attempts include: bariatric surgery. These have produced a maximum of 20 pounds of weight loss.  \par Anti-obesity medications in the past: fen-phen, phentermine - in her 30s. \par \par Reasons for desiring weight loss: health\par Perceived obstacles to losing weight: decr activity\par \par Sleep: 6 hours. 1230 am - 630 am.  No SYLWIA.  \par \par Has 2 regular meals a day. \par \par Diet history:\par wakes up at:\par B: 2 cups of coffee in the morning.  Or 1 egg\par L: sit at her desk - salad, or soup, or sandwich w low marcus bread - ham with mustard.  Or [employer] - 1/2 soup, half salad. no snacks at work\par D: 730 pm - drive thru on way home - grilled chicken without bun, or Miranda[employer] salads.  Or protein bowl from [employer].  fish and vegetables. or turkey meatballs.  Doesn't have much available in pantry, freezer. \par \par Likes to eat red kidney beans. not black beans. adds to salads\par \par snacks: no\par eating after dinner:  no but has late dinner\par overeating episodes: none\par \par Sodas/fast food/processed foods: take out or drive thru\par \par Water intake per day:  1-2L per day\par +crystal light - 1 gallon a day.  Mixes with sweetened iced tea and watered.  \par \par Physical activity:\par Patient enjoys: walking\par Current physical activity: limited - walking \par \par Habits patient would like to change: having snacks\par Level of interest in losing weight: 5/5\par Community support: limited\par \par Factors that have helped in the past with losing weight and keeping it off: phentermine\par \par

## 2023-03-15 NOTE — REASON FOR VISIT
[Follow-Up] : a follow-up visit [Home] : at home, [unfilled] , at the time of the visit. [Medical Office: ([facility])___] : at the medical office located in  [Patient] : the patient

## 2023-03-15 NOTE — ASSESSMENT
[FreeTextEntry1] : 56 y.o F with Class 3 obesity, +sleeve 2020 Nov, anxiety - sees Dr. rincon, OA, hypoTSH, presents for weight management\par \par - continue phentermine, doing well - taking 1 tab\par - motivates her to get out of bed more on meds, more activity\par - she's following up with neuropathy 2/2 sciatica, pinched nerve\par - discussed having breakfast daily\par - discussed on incr food items in pantry, freezer to have available when she gets home, pre- made, or oven ready\par - see neurologist for leg numbness - important for function, alia since she notices numbness getting worse\par \par f/u 6-8 weeks

## 2023-03-20 ENCOUNTER — APPOINTMENT (OUTPATIENT)
Dept: INTERNAL MEDICINE | Facility: CLINIC | Age: 58
End: 2023-03-20
Payer: COMMERCIAL

## 2023-03-20 LAB
25(OH)D3 SERPL-MCNC: 38.8 NG/ML
ALBUMIN SERPL ELPH-MCNC: 4.6 G/DL
ALP BLD-CCNC: 109 U/L
ALT SERPL-CCNC: 18 U/L
ANION GAP SERPL CALC-SCNC: 14 MMOL/L
AST SERPL-CCNC: 16 U/L
BASOPHILS # BLD AUTO: 0.06 K/UL
BASOPHILS NFR BLD AUTO: 0.5 %
BILIRUB SERPL-MCNC: 0.3 MG/DL
BUN SERPL-MCNC: 25 MG/DL
CALCIUM SERPL-MCNC: 9.8 MG/DL
CHLORIDE SERPL-SCNC: 102 MMOL/L
CO2 SERPL-SCNC: 26 MMOL/L
CREAT SERPL-MCNC: 0.86 MG/DL
EGFR: 78 ML/MIN/1.73M2
EOSINOPHIL # BLD AUTO: 0.11 K/UL
EOSINOPHIL NFR BLD AUTO: 0.9 %
FERRITIN SERPL-MCNC: 182 NG/ML
FOLATE SERPL-MCNC: >20 NG/ML
GLUCOSE SERPL-MCNC: 111 MG/DL
HCT VFR BLD CALC: 47 %
HGB BLD-MCNC: 15.5 G/DL
IMM GRANULOCYTES NFR BLD AUTO: 0.4 %
IRON SERPL-MCNC: 138 UG/DL
LYMPHOCYTES # BLD AUTO: 3.61 K/UL
LYMPHOCYTES NFR BLD AUTO: 29.5 %
MAN DIFF?: NORMAL
MCHC RBC-ENTMCNC: 31.6 PG
MCHC RBC-ENTMCNC: 33 GM/DL
MCV RBC AUTO: 95.9 FL
MONOCYTES # BLD AUTO: 1.08 K/UL
MONOCYTES NFR BLD AUTO: 8.8 %
NEUTROPHILS # BLD AUTO: 7.32 K/UL
NEUTROPHILS NFR BLD AUTO: 59.9 %
PLATELET # BLD AUTO: 333 K/UL
POTASSIUM SERPL-SCNC: 4 MMOL/L
PROT SERPL-MCNC: 7 G/DL
RBC # BLD: 4.9 M/UL
RBC # FLD: 13.3 %
SODIUM SERPL-SCNC: 141 MMOL/L
TSH SERPL-ACNC: 0.59 UIU/ML
VIT B1 SERPL-MCNC: 157 NMOL/L
VIT B12 SERPL-MCNC: 1051 PG/ML
WBC # FLD AUTO: 12.23 K/UL
ZINC SERPL-MCNC: 94 UG/DL

## 2023-03-20 PROCEDURE — 99213 OFFICE O/P EST LOW 20 MIN: CPT | Mod: 95

## 2023-03-20 NOTE — HISTORY OF PRESENT ILLNESS
[Home] : at home, [unfilled] , at the time of the visit. [Medical Office: ([facility])___] : at the medical office located in  [Verbal consent obtained from patient] : the patient, [unfilled] [de-identified] : Pt asks for med renewal via TEB  Pt will be coming in next month as WBC was 13.2 and has been slowly increasing  Pt feels well

## 2023-03-20 NOTE — REVIEW OF SYSTEMS
[Chills] : no chills [Chest Pain] : no chest pain [Palpitations] : no palpitations [Lower Ext Edema] : no lower extremity edema [Shortness Of Breath] : no shortness of breath [Wheezing] : no wheezing [Insomnia] : insomnia [Anxiety] : anxiety

## 2023-03-25 NOTE — PHYSICAL EXAM
[Obese, well nourished, in no acute distress] : obese, well nourished, in no acute distress [Normal] : affect appropriate [de-identified] : obese, soft, nontender, no evidence of hernia

## 2023-03-25 NOTE — ASSESSMENT
[FreeTextEntry1] : 57-year-old woman with longstanding history of morbid obesity status post laparoscopic sleeve gastrectomy in November 2020 by Dr. Candelaria in [place].  Patient presents today for follow up would like to lose more weight, after having initial inadequate weight loss and subsequent weight regain.  Patient wishes to lose more weight.\par \par Nutrition and exercise guidelines were extensively reviewed with the patient.  \par \par Nutrition evaluation\par Psych evaluation\par Dietary changes specifically preparing food rather than takeout and eating earlier in the day\par Referral to obesity medicine for weight loss medications\par Smoking cessation\par Increase daily activity, track steps\par All questions answered\par Follow-up in 3 months\par \par Call with any questions or concerns\par \par

## 2023-03-25 NOTE — HISTORY OF PRESENT ILLNESS
[Procedure: ___] : Procedure performed: [unfilled]  [Date of Surgery: ___] : Date of Surgery:   [unfilled] [Surgeon Name:   ___] : Surgeon Name: Dr. MORALEZ [Pre-Op Weight ___] : Pre-op weight was [unfilled] lbs [de-identified] : 57-year-old woman with longstanding history of morbid obesity status post laparoscopic sleeve gastrectomy in November 2020 by Dr. Candelaria in [place].  Patient presents today for follow up would like to lose more weight, after having initial inadequate weight loss and subsequent weight regain.  Weight stable since last visit.  Patient has appointments with nutritionist, psychologist, and obesity medicine in the near future.  Patient had upper GI series August 17, 2022 which revealed narrow appearance of the gastric sleeve consistent with surgery no evidence of stricture small hiatal hernia as well as reflux.  Patient reports no reflux or constipation.  Continues to take NSAIDs for arthritis needs bilateral knee replacements.  Patient continues to have limited mobility now with plantar fasciitis.  Patient is currently eating 3 meals a day and trying to limit snacking.  Patient continues to smoke.

## 2023-03-27 ENCOUNTER — NON-APPOINTMENT (OUTPATIENT)
Age: 58
End: 2023-03-27

## 2023-03-27 LAB — VIT A SERPL-MCNC: 63 UG/DL

## 2023-04-17 ENCOUNTER — APPOINTMENT (OUTPATIENT)
Dept: INTERNAL MEDICINE | Facility: CLINIC | Age: 58
End: 2023-04-17

## 2023-04-28 ENCOUNTER — APPOINTMENT (OUTPATIENT)
Dept: INTERNAL MEDICINE | Facility: CLINIC | Age: 58
End: 2023-04-28
Payer: COMMERCIAL

## 2023-04-28 VITALS
RESPIRATION RATE: 16 BRPM | HEART RATE: 94 BPM | OXYGEN SATURATION: 98 % | TEMPERATURE: 97.7 F | HEIGHT: 65.5 IN | BODY MASS INDEX: 48.23 KG/M2 | WEIGHT: 293 LBS

## 2023-04-28 VITALS — SYSTOLIC BLOOD PRESSURE: 130 MMHG | DIASTOLIC BLOOD PRESSURE: 78 MMHG

## 2023-04-28 PROCEDURE — 99214 OFFICE O/P EST MOD 30 MIN: CPT

## 2023-04-28 NOTE — REVIEW OF SYSTEMS
[Fever] : no fever [Chills] : no chills [Chest Pain] : no chest pain [Palpitations] : no palpitations [Lower Ext Edema] : no lower extremity edema [Shortness Of Breath] : no shortness of breath [Wheezing] : no wheezing [Cough] : no cough [Abdominal Pain] : no abdominal pain [Vomiting] : no vomiting [de-identified] : neuropathy in hands and feet

## 2023-04-28 NOTE — PHYSICAL EXAM
[No Acute Distress] : no acute distress [Normal Sclera/Conjunctiva] : normal sclera/conjunctiva [Normal Outer Ear/Nose] : the outer ears and nose were normal in appearance [No JVD] : no jugular venous distention [No Lymphadenopathy] : no lymphadenopathy [No Respiratory Distress] : no respiratory distress  [No Accessory Muscle Use] : no accessory muscle use [Clear to Auscultation] : lungs were clear to auscultation bilaterally [Normal Rate] : normal rate  [Regular Rhythm] : with a regular rhythm [No Edema] : there was no peripheral edema [No Extremity Clubbing/Cyanosis] : no extremity clubbing/cyanosis [Soft] : abdomen soft [Non Tender] : non-tender [Grossly Normal Strength/Tone] : grossly normal strength/tone

## 2023-04-30 LAB
ALBUMIN SERPL ELPH-MCNC: 4.3 G/DL
ALP BLD-CCNC: 100 U/L
ALT SERPL-CCNC: 17 U/L
ANION GAP SERPL CALC-SCNC: 15 MMOL/L
AST SERPL-CCNC: 16 U/L
BASOPHILS # BLD AUTO: 0.06 K/UL
BASOPHILS NFR BLD AUTO: 0.7 %
BILIRUB SERPL-MCNC: 0.3 MG/DL
BUN SERPL-MCNC: 18 MG/DL
CALCIUM SERPL-MCNC: 9.3 MG/DL
CHLORIDE SERPL-SCNC: 102 MMOL/L
CHOLEST SERPL-MCNC: 177 MG/DL
CO2 SERPL-SCNC: 23 MMOL/L
CREAT SERPL-MCNC: 0.78 MG/DL
EGFR: 87 ML/MIN/1.73M2
EOSINOPHIL # BLD AUTO: 0.25 K/UL
EOSINOPHIL NFR BLD AUTO: 2.9 %
ESTIMATED AVERAGE GLUCOSE: 114 MG/DL
GLUCOSE SERPL-MCNC: 146 MG/DL
HBA1C MFR BLD HPLC: 5.6 %
HCT VFR BLD CALC: 44.6 %
HDLC SERPL-MCNC: 59 MG/DL
HGB BLD-MCNC: 14.8 G/DL
IMM GRANULOCYTES NFR BLD AUTO: 0.3 %
LDLC SERPL CALC-MCNC: 84 MG/DL
LYMPHOCYTES # BLD AUTO: 2.27 K/UL
LYMPHOCYTES NFR BLD AUTO: 26.3 %
MAN DIFF?: NORMAL
MCHC RBC-ENTMCNC: 31.4 PG
MCHC RBC-ENTMCNC: 33.2 GM/DL
MCV RBC AUTO: 94.7 FL
MONOCYTES # BLD AUTO: 0.61 K/UL
MONOCYTES NFR BLD AUTO: 7.1 %
NEUTROPHILS # BLD AUTO: 5.4 K/UL
NEUTROPHILS NFR BLD AUTO: 62.7 %
NONHDLC SERPL-MCNC: 117 MG/DL
PLATELET # BLD AUTO: 304 K/UL
POTASSIUM SERPL-SCNC: 3.7 MMOL/L
PROT SERPL-MCNC: 6.2 G/DL
RBC # BLD: 4.71 M/UL
RBC # FLD: 13.1 %
SODIUM SERPL-SCNC: 140 MMOL/L
T4 FREE SERPL-MCNC: 1.6 NG/DL
TRIGL SERPL-MCNC: 167 MG/DL
TSH SERPL-ACNC: 1.96 UIU/ML
WBC # FLD AUTO: 8.62 K/UL

## 2023-05-02 ENCOUNTER — APPOINTMENT (OUTPATIENT)
Dept: BARIATRICS | Facility: CLINIC | Age: 58
End: 2023-05-02
Payer: COMMERCIAL

## 2023-05-02 VITALS
OXYGEN SATURATION: 99 % | TEMPERATURE: 96.6 F | SYSTOLIC BLOOD PRESSURE: 160 MMHG | HEART RATE: 90 BPM | BODY MASS INDEX: 48.23 KG/M2 | WEIGHT: 293 LBS | HEIGHT: 65.5 IN | DIASTOLIC BLOOD PRESSURE: 110 MMHG

## 2023-05-02 PROCEDURE — 99215 OFFICE O/P EST HI 40 MIN: CPT

## 2023-05-02 RX ORDER — DICLOFENAC SODIUM AND MISOPROSTOL 75; 200 MG/1; UG/1
75-0.2 TABLET, DELAYED RELEASE ORAL
Qty: 60 | Refills: 0 | Status: DISCONTINUED | COMMUNITY
Start: 2020-05-18 | End: 2023-05-02

## 2023-05-04 ENCOUNTER — NON-APPOINTMENT (OUTPATIENT)
Age: 58
End: 2023-05-04

## 2023-05-05 ENCOUNTER — APPOINTMENT (OUTPATIENT)
Dept: INTERNAL MEDICINE | Facility: CLINIC | Age: 58
End: 2023-05-05
Payer: COMMERCIAL

## 2023-05-05 ENCOUNTER — RX RENEWAL (OUTPATIENT)
Age: 58
End: 2023-05-05

## 2023-05-05 LAB — VIT A SERPL-MCNC: 50.5 UG/DL

## 2023-05-05 PROCEDURE — 99213 OFFICE O/P EST LOW 20 MIN: CPT | Mod: 95

## 2023-05-05 NOTE — HISTORY OF PRESENT ILLNESS
[de-identified] : Pt asks for teb as she had elevated bp in Dr Estrada office 170/100  Pt had taken phentermine and Double coffee and excedrin and was very anxious.  Pt has been monitoring in office and now 160s over 90s  still high No headache  Off phenterminee Had high bp yrs ago [Home] : at home, [unfilled] , at the time of the visit. [Medical Office: ([facility])___] : at the medical office located in  [Verbal consent obtained from patient] : the patient, [unfilled]

## 2023-05-08 NOTE — PHYSICAL EXAM
[Obese, well nourished, in no acute distress] : obese, well nourished, in no acute distress [Normal] : affect appropriate [de-identified] : obese, soft, nontender, no evidence of hernia

## 2023-05-08 NOTE — ASSESSMENT
[FreeTextEntry1] : \par 57-year-old woman > 2 years post laparoscopic sleeve gastrectomy in November 2020 by Dr. Candelaria at [facility].  Current smoker. Pt with elevated at BP at time of visit, attributes it to taking Phentermine 37.5 mg, double espresso and Excedrin for migraine.No nausea, vomiting, reflux, diarrhea or constipation. \par \par Healthy eating and  exercise guidelines were extensively reviewed with the patient.  \par \par Dietary changes specifically cooking at home rather than takeout meals were discussed.\par Increase daily activity , track steps at work\par Emphasized Smoking Cessation. \par Emphasized need to go to ED for elevated BP levels today- Pt refused\par Speak with OM, PCP regarding pt HTN readings\par Follow up 3 months \par All questions answered\par \par \par Call with any questions or concerns\par \par \par \par \par \par \par

## 2023-05-08 NOTE — HISTORY OF PRESENT ILLNESS
[Procedure: ___] : Procedure performed: [unfilled]  [Date of Surgery: ___] : Date of Surgery:   [unfilled] [Surgeon Name:   ___] : Surgeon Name: Dr. MORALEZ [Pre-Op Weight ___] : Pre-op weight was [unfilled] lbs [de-identified] : 57-year-old woman > 2 years post laparoscopic sleeve gastrectomy in November 2020 by  Dr. Candelaria at [facility].  Pt continues to take shots for arthritis needs bilateral knee replacements.Limited mobility with bilateral knee pain.  Patient is currently skipping breakfast and eating 2 meals a day and trying to limit snacking. Drinking zero calorie 32 Oz/ day. Sleeping well. Patient continues to smoke. No nausea, vomiting, reflux, diarrhea or constipation\par \par Pt with elevated at BP at time of visit, attributes it to taking Phentermine 37.5 mg, double espresso for breakfast and Excedrin for migraine.. \par Upon arrival pt BP was 160/110. Repeat readings were 140/110. Strongly recommended pt go to ED. Pt refused. to go to ED.\par \par Labs performed as of 3/15/2023. Results discussed with pt. Vit A 63 H . No other significant abnormalities.

## 2023-05-10 ENCOUNTER — RX RENEWAL (OUTPATIENT)
Age: 58
End: 2023-05-10

## 2023-05-13 ENCOUNTER — TRANSCRIPTION ENCOUNTER (OUTPATIENT)
Age: 58
End: 2023-05-13

## 2023-05-13 ENCOUNTER — NON-APPOINTMENT (OUTPATIENT)
Age: 58
End: 2023-05-13

## 2023-05-15 ENCOUNTER — APPOINTMENT (OUTPATIENT)
Dept: INTERNAL MEDICINE | Facility: CLINIC | Age: 58
End: 2023-05-15
Payer: COMMERCIAL

## 2023-05-15 VITALS
HEIGHT: 65.5 IN | WEIGHT: 293 LBS | BODY MASS INDEX: 48.23 KG/M2 | TEMPERATURE: 97.9 F | HEART RATE: 104 BPM | OXYGEN SATURATION: 100 % | RESPIRATION RATE: 16 BRPM

## 2023-05-15 VITALS — SYSTOLIC BLOOD PRESSURE: 160 MMHG | DIASTOLIC BLOOD PRESSURE: 92 MMHG

## 2023-05-15 DIAGNOSIS — R21 RASH AND OTHER NONSPECIFIC SKIN ERUPTION: ICD-10-CM

## 2023-05-15 PROCEDURE — 99213 OFFICE O/P EST LOW 20 MIN: CPT

## 2023-05-15 NOTE — HISTORY OF PRESENT ILLNESS
[de-identified] : Pt comes for BP check  Had stopped meds on saturday because she had a hive on her neck  It is improving  Bp running high 170 90s 100s   Had been fairly normal until recently   Creat 0.84 on april 29

## 2023-05-15 NOTE — PHYSICAL EXAM
[No Acute Distress] : no acute distress [Normal Sclera/Conjunctiva] : normal sclera/conjunctiva [Normal Outer Ear/Nose] : the outer ears and nose were normal in appearance [Normal Oropharynx] : the oropharynx was normal [No JVD] : no jugular venous distention [No Lymphadenopathy] : no lymphadenopathy [Supple] : supple [No Respiratory Distress] : no respiratory distress  [No Accessory Muscle Use] : no accessory muscle use [Normal Rate] : normal rate  [Regular Rhythm] : with a regular rhythm [No Edema] : there was no peripheral edema [No Extremity Clubbing/Cyanosis] : no extremity clubbing/cyanosis [Soft] : abdomen soft [Non Tender] : non-tender [Non-distended] : non-distended [No Masses] : no abdominal mass palpated [Normal Bowel Sounds] : normal bowel sounds [No Spinal Tenderness] : no spinal tenderness [Grossly Normal Strength/Tone] : grossly normal strength/tone

## 2023-05-25 ENCOUNTER — RX RENEWAL (OUTPATIENT)
Age: 58
End: 2023-05-25

## 2023-06-12 ENCOUNTER — APPOINTMENT (OUTPATIENT)
Dept: BARIATRICS/WEIGHT MGMT | Facility: CLINIC | Age: 58
End: 2023-06-12
Payer: COMMERCIAL

## 2023-06-12 ENCOUNTER — APPOINTMENT (OUTPATIENT)
Dept: PAIN MANAGEMENT | Facility: CLINIC | Age: 58
End: 2023-06-12
Payer: COMMERCIAL

## 2023-06-12 VITALS — BODY MASS INDEX: 51.29 KG/M2 | WEIGHT: 293 LBS

## 2023-06-12 DIAGNOSIS — M54.12 RADICULOPATHY, CERVICAL REGION: ICD-10-CM

## 2023-06-12 PROCEDURE — 99214 OFFICE O/P EST MOD 30 MIN: CPT | Mod: 95

## 2023-06-12 PROCEDURE — 99213 OFFICE O/P EST LOW 20 MIN: CPT | Mod: 95

## 2023-06-12 NOTE — ASSESSMENT
[FreeTextEntry1] : 56 y.o F with Class 3 obesity, +sleeve 2020 Nov, anxiety - sees Dr. rincon, OA, hypoTSH, presents for weight management\par \par - will check for Saxenda coverage\par - hold phentermine due to bp not controlled.  she really wants to go back on it but bc of tobacco use and incr bp and med titrating it is not a safe option for her\par - discussed tobacco cessation "now is not a good time" - contemplation\par - motivates her to get out of bed more on meds, more activity\par - she's following up with neuropathy 2/2 sciatica, pinched nerve\par - discussed having breakfast daily\par - discussed on incr food items in pantry, freezer to have available when she gets home, pre- made, or oven ready\par - see neurologist for leg numbness - important for function, alia since she notices numbness getting worse\par \par f/u 6-8 weeks

## 2023-06-12 NOTE — REVIEW OF SYSTEMS
[Negative] : Respiratory [MED-ROS-Neuro-FT] : numbness in legs calves down [MED-ROS-Musclo-FT] : back pains

## 2023-06-12 NOTE — ASSESSMENT
[FreeTextEntry1] : Interim history\par he patient returns with pain that has been treated adequately in the past with epidural steroid injections.  The patient's complaints are consistent with radiculopathy. Patient's ADL's increase with prior TERRY.   The last injection gave greater than 60% reduction of the pain but now there is a return of symptoms. The patient is requesting a subsequent epidural steroid injection to alleviate pain and improve functional ability and quality of life.  Patient is a candidate.\par Objective findings\par Since the last visit, there have been no new imaging studies. THe patient has no new symptoms except the return of the their pain complaints. Motor and sensory function is unchanged.\par Plan\par Spoke to patient about epidural steroid injections. Explained risks, benefits and alternatives including but not limited to the risk of infection, bleeding, headache, syncopal episode, failure to resolve issues, allergic reaction, symptom recurrence, allergic reaction, nerve injury, and increased pain. The patient understands the risks. All questions were answered. The patient is willing to proceed.\par

## 2023-06-12 NOTE — PHYSICAL EXAM
[Obese, well nourished, in no acute distress] : obese, well nourished, in no acute distress [Normal] : PERRL, EOMI, no conjunctival injection, anicteric Self

## 2023-06-12 NOTE — HISTORY OF PRESENT ILLNESS
[Neck] : neck [Lower back] : lower back [6] : 6 [Burning] : burning [Shooting] : shooting [Throbbing] : throbbing [Constant] : constant [Household chores] : household chores [Sitting] : sitting [Standing] : standing [Walking] : walking [Exercising] : exercising [Home] : at home, [unfilled] , at the time of the visit. [Medical Office: ([facility])___] : at the medical office located in  [Verbal consent obtained from patient] : the patient, [unfilled] [] : This patient has had an injection before: no [de-identified] : 01/09/2022 [de-identified] : LS [de-identified] : OCTAVIA [TWNoteComboBox1] : 50%

## 2023-06-12 NOTE — REASON FOR VISIT
[Follow-Up] : a follow-up visit [Home] : at home, [unfilled] , at the time of the visit. [Medical Office: ([facility])___] : at the medical office located in  [Patient] : the patient

## 2023-06-12 NOTE — HISTORY OF PRESENT ILLNESS
[FreeTextEntry1] : Bariatric surgery history: sleeve Nov 2020 - Dr Candelaria\par Obesity co-morbidities:  insomnia, anxiety\par Comorbidities improved or resolved: none\par Anti-obesity medications: phentermine\par Obesity medication side effects: none\par \par PATIENT WAS NOTIFIED THAT ANYTHING WE DISCUSSED IN OUR MEETING TODAY MAY BE REFLECTED IN WRITING IN THE VISIT NOTE WHICH WILL BE AVAILABLE TO OTHER MEDICAL PROVIDERS TO REVIEW AS PART OF ROUTINE PATIENT CARE.  PATIENT VERBALLY AGREED. \par \par Ms. SIMIN DIA is a 57 year year old female who presents for evaluation and treatment of Class 3 obesity. \par \par Obesity related co -morbidities: insomnia, anxiety. also tobacco use. \par pre op 365 - Nov 2020. had sleeve\par Takes Lyrica \par Anxiety - PRN xanax 3-4 times a week\par Anxiety/depression - has seen a lot of therapist.  went through a lot of grief counseling. She struggles with change.  Even getting new shoes, or going to a new pharmacy for a covid shot, daily unknowns can cause her to be panicky.  Sees Dr. Tucker.\par \par Interim:\par - phentermine stopped due to increased bp. \par - started amlodipine, telmsartan\par - bringing lunch to work now\par - sister brought her  in home, using it more, no more jose donuts\par - drinking black coffee with 3 Splendas, no milk\par - will have hamburger tonight, no buns.  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 \par \par Weight History:\par Lowest adult weight: unsure, size 14. \par Highest adult weight: 365# preop\par \par Has lost 20 pounds over the past year.\par \par Obesity began in adulthood.  Weight gain has occurred with: Started gaining weight in late 20s, with father passed away. Went over 300#, size 18 or so. Got , and lost weight with working out, exercising in mid 30s.  Lost her daughter - late 30s, and went back up past 300#.  And  got sick - a few years ago, passed away. Had hip replacement - back surgery as well.  And knees - OA, numbness on calves down, and unsteady - pcp recommends neurology appointment, hasn't got around to making that\par \par These days, doing more housework - laundry, etc.\par \par Past weight loss attempts include: bariatric surgery. These have produced a maximum of 20 pounds of weight loss.  \par Anti-obesity medications in the past: fen-phen, phentermine - in her 30s. \par \par Reasons for desiring weight loss: health\par Perceived obstacles to losing weight: decr activity\par \par Sleep: 6 hours. 1230 am - 630 am.  No SYLWIA.  \par \par Has 2 regular meals a day. \par \par Diet history:\par wakes up at:\par B: 2 cups of coffee in the morning.  Or 1 egg\par L: sit at her desk - salad, or soup, or sandwich w low marcus bread - ham with mustard.  Or [employer] - 1/2 soup, half salad. no snacks at work\par D: 730 pm - drive thru on way home - grilled chicken without bun, or [employer] salads.  Or protein bowl from [employer].  fish and vegetables. or turkey meatballs.  Doesn't have much available in pantry, freezer. \par \par Likes to eat red kidney beans. not black beans. adds to salads\par \par snacks: no\par eating after dinner:  no but has late dinner\par overeating episodes: none\par \par Sodas/fast food/processed foods: take out or drive thru\par \par Water intake per day:  1-2L per day\par +crystal light - 1 gallon a day.  Mixes with sweetened iced tea and watered.  \par \par Physical activity:\par Patient enjoys: walking\par Current physical activity: limited - walking \par \par Habits patient would like to change: having snacks\par Level of interest in losing weight: 5/5\par Community support: limited\par \par Factors that have helped in the past with losing weight and keeping it off: phentermine\par \par

## 2023-06-13 ENCOUNTER — APPOINTMENT (OUTPATIENT)
Dept: CARDIOLOGY | Facility: CLINIC | Age: 58
End: 2023-06-13

## 2023-06-15 ENCOUNTER — NON-APPOINTMENT (OUTPATIENT)
Age: 58
End: 2023-06-15

## 2023-06-15 ENCOUNTER — APPOINTMENT (OUTPATIENT)
Dept: CARDIOLOGY | Facility: CLINIC | Age: 58
End: 2023-06-15
Payer: COMMERCIAL

## 2023-06-15 VITALS
SYSTOLIC BLOOD PRESSURE: 130 MMHG | HEART RATE: 83 BPM | WEIGHT: 293 LBS | HEIGHT: 65 IN | DIASTOLIC BLOOD PRESSURE: 98 MMHG | BODY MASS INDEX: 48.82 KG/M2 | OXYGEN SATURATION: 97 %

## 2023-06-15 PROCEDURE — 99214 OFFICE O/P EST MOD 30 MIN: CPT | Mod: 25

## 2023-06-15 PROCEDURE — 93000 ELECTROCARDIOGRAM COMPLETE: CPT

## 2023-06-16 ENCOUNTER — APPOINTMENT (OUTPATIENT)
Dept: INTERNAL MEDICINE | Facility: CLINIC | Age: 58
End: 2023-06-16
Payer: COMMERCIAL

## 2023-06-16 PROCEDURE — 99213 OFFICE O/P EST LOW 20 MIN: CPT | Mod: 95

## 2023-06-16 NOTE — HISTORY OF PRESENT ILLNESS
[Home] : at home, [unfilled] , at the time of the visit. [Medical Office: ([facility])___] : at the medical office located in  [Verbal consent obtained from patient] : the patient, [unfilled] [de-identified] : Pt asks for TEB for med renewal  Pt just recently seen but was not due for xanax  Pt had BP med doubled by cardio

## 2023-06-19 ENCOUNTER — NON-APPOINTMENT (OUTPATIENT)
Age: 58
End: 2023-06-19

## 2023-06-21 ENCOUNTER — APPOINTMENT (OUTPATIENT)
Dept: CARDIOLOGY | Facility: CLINIC | Age: 58
End: 2023-06-21
Payer: COMMERCIAL

## 2023-06-21 ENCOUNTER — NON-APPOINTMENT (OUTPATIENT)
Age: 58
End: 2023-06-21

## 2023-06-21 VITALS
DIASTOLIC BLOOD PRESSURE: 98 MMHG | BODY MASS INDEX: 48.82 KG/M2 | SYSTOLIC BLOOD PRESSURE: 148 MMHG | HEIGHT: 65 IN | WEIGHT: 293 LBS | HEART RATE: 91 BPM | OXYGEN SATURATION: 97 %

## 2023-06-21 PROCEDURE — 99214 OFFICE O/P EST MOD 30 MIN: CPT | Mod: 25

## 2023-06-21 PROCEDURE — 93000 ELECTROCARDIOGRAM COMPLETE: CPT

## 2023-07-17 ENCOUNTER — APPOINTMENT (OUTPATIENT)
Dept: BARIATRICS/WEIGHT MGMT | Facility: CLINIC | Age: 58
End: 2023-07-17
Payer: COMMERCIAL

## 2023-07-17 PROCEDURE — 99214 OFFICE O/P EST MOD 30 MIN: CPT | Mod: 95

## 2023-07-17 RX ORDER — LIRAGLUTIDE 6 MG/ML
18 INJECTION, SOLUTION SUBCUTANEOUS
Qty: 1 | Refills: 0 | Status: DISCONTINUED | COMMUNITY
Start: 2023-06-12 | End: 2023-07-17

## 2023-07-17 NOTE — ASSESSMENT
[FreeTextEntry1] : 56 y.o F with Class 3 obesity, +sleeve 2020 Nov, anxiety - sees Dr. rincon, OA, hypoTSH, presents for weight management\par \par - Saxenda not covered\par - restart phentermine, cardiology following\par - discussed tobacco cessation  - contemplation\par - motivates her to get out of bed more on meds, more activity\par - she's following up with neuropathy 2/2 sciatica, pinched nerve\par - discussed having breakfast daily\par - discussed on incr food items in pantry, freezer to have available when she gets home, pre- made, or oven ready\par - see neurologist for leg numbness - important for function, alia since she notices numbness getting worse\par \par f/u 6-8 weeks

## 2023-07-17 NOTE — HISTORY OF PRESENT ILLNESS
[FreeTextEntry1] : Bariatric surgery history: sleeve Nov 2020 - Dr Candelaria\par Obesity co-morbidities:  insomnia, anxiety\par Comorbidities improved or resolved: none\par Anti-obesity medications: phentermine\par Obesity medication side effects: none\par \par PATIENT WAS NOTIFIED THAT ANYTHING WE DISCUSSED IN OUR MEETING TODAY MAY BE REFLECTED IN WRITING IN THE VISIT NOTE WHICH WILL BE AVAILABLE TO OTHER MEDICAL PROVIDERS TO REVIEW AS PART OF ROUTINE PATIENT CARE.  PATIENT VERBALLY AGREED. \par \par Ms. SIMIN DIA is a 57 year year old female who presents for evaluation and treatment of Class 3 obesity. \par \par Obesity related co -morbidities: insomnia, anxiety. also tobacco use. \par pre op 365 - Nov 2020. had sleeve\par Takes Lyrica \par Anxiety - PRN xanax 3-4 times a week\par Anxiety/depression - has seen a lot of therapist.  went through a lot of grief counseling. She struggles with change.  Even getting new shoes, or going to a new pharmacy for a covid shot, daily unknowns can cause her to be panicky.  Sees Dr. Tucker.\par \par Interim:\par - previously phentermine stopped due to increased bp. \par - cardiology - had work up, told she can take phentermine again - Dr. 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 \par \par Weight History:\par Lowest adult weight: unsure, size 14. \par Highest adult weight: 365# preop\par \par Has lost 20 pounds over the past year.\par \par Obesity began in adulthood.  Weight gain has occurred with: Started gaining weight in late 20s, with father passed away. Went over 300#, size 18 or so. Got , and lost weight with working out, exercising in mid 30s.  Lost her daughter - late 30s, and went back up past 300#.  And  got sick - a few years ago, passed away. Had hip replacement - back surgery as well.  And knees - OA, numbness on calves down, and unsteady - pcp recommends neurology appointment, hasn't got around to making that\par \par These days, doing more housework - laundry, etc.\par \par Past weight loss attempts include: bariatric surgery. These have produced a maximum of 20 pounds of weight loss.  \par Anti-obesity medications in the past: fen-phen, phentermine - in her 30s. \par \par Reasons for desiring weight loss: health\par Perceived obstacles to losing weight: decr activity\par \par Sleep: 6 hours. 1230 am - 630 am.  No SYLWIA.  \par \par Has 2 regular meals a day. \par \par Diet history:\par wakes up at:\par B: 2 cups of coffee in the morning.  Or 1 egg\par L: sit at her desk - salad, or soup, or sandwich w low marcus bread - ham with mustard.  Or [employer] - 1/2 soup, half salad. no snacks at work\par D: 730 pm - drive thru on way home - grilled chicken without bun, or [employer] salads.  Or protein bowl from [employer].  fish and vegetables. or turkey meatballs.  Doesn't have much available in pantry, freezer. \par \par Likes to eat red kidney beans. not black beans. adds to salads\par \par snacks: no\par eating after dinner:  no but has late dinner\par overeating episodes: none\par \par Sodas/fast food/processed foods: take out or drive thru\par \par Water intake per day:  1-2L per day\par +crystal light - 1 gallon a day.  Mixes with sweetened iced tea and watered.  \par \par Physical activity:\par Patient enjoys: walking\par Current physical activity: limited - walking \par

## 2023-07-17 NOTE — REASON FOR VISIT
[Follow-Up] : a follow-up visit [Home] : at home, [unfilled] , at the time of the visit. [Medical Office: ([facility])___] : at the medical office located in  [Patient] : the patient supervision

## 2023-07-18 NOTE — HISTORY OF PRESENT ILLNESS
[FreeTextEntry1] : Alisa 52yo lady with a PMH of tobacco use; here for pre-op evaluation.  Needs a right hip replacement, but cannot proceed 2/2 weight... need bariatric surgery prior to hip replacement.  Very sedentary lifestyle 2/2 severe hip pain, but denied chest pain/dyspnea/palpitations/syncope.  Compliant with meds.  +smoker ~1/2PPD.\par \par 8/9/17: s/p 2D echo and nuclear stress test\par -2D echo: LVEF 60% with no wall motion abnl; mild MR\par -nuclear stress test: normal study with breast attenuation artifact; no evidence of ischemia by EKG; LVEF 66% \par \par 10/16/17: feeling well.  Completely asymptomatic.  Denied chest pain/dyspnea/palpitations/syncope.  2D echo and nuclear stress test normal as above. \par \par 9/8/2020:  s/p hip replacement.  Since that time, unable to exercise 2/2 knee osteoarthitis.  Gained >50lbs.  Awaiting bariatric surgery.  Very SOB with minimal exertion.\par \par 6/15/23:\par newly dx HTN with heacaches\par started on amlodipine and telmisartan\par SBPs improved but not at goal... DBPs 90s and systolics up to 150-160s intermittently

## 2023-07-18 NOTE — PHYSICAL EXAM
[General Appearance - Well Developed] : well developed [Normal Appearance] : normal appearance [Well Groomed] : well groomed [General Appearance - Well Nourished] : well nourished [No Deformities] : no deformities [General Appearance - In No Acute Distress] : no acute distress [Normal Conjunctiva] : the conjunctiva exhibited no abnormalities [Eyelids - No Xanthelasma] : the eyelids demonstrated no xanthelasmas [Normal Oral Mucosa] : normal oral mucosa [No Oral Pallor] : no oral pallor [No Oral Cyanosis] : no oral cyanosis [Normal Jugular Venous A Waves Present] : normal jugular venous A waves present [Normal Jugular Venous V Waves Present] : normal jugular venous V waves present [No Jugular Venous Lee A Waves] : no jugular venous lee A waves [Abdomen Soft] : soft [Abdomen Tenderness] : non-tender [Abdomen Mass (___ Cm)] : no abdominal mass palpated [Nail Clubbing] : no clubbing of the fingernails [Cyanosis, Localized] : no localized cyanosis [Petechial Hemorrhages (___cm)] : no petechial hemorrhages [Skin Color & Pigmentation] : normal skin color and pigmentation [] : no rash [No Venous Stasis] : no venous stasis [Skin Lesions] : no skin lesions [No Skin Ulcers] : no skin ulcer [No Xanthoma] : no  xanthoma was observed [Oriented To Time, Place, And Person] : oriented to person, place, and time [Affect] : the affect was normal [Mood] : the mood was normal [No Anxiety] : not feeling anxious [Normal Rate] : normal [Normal S1] : normal S1 [Normal S2] : normal S2 [II] : a grade 2 [2+] : left 2+ [No Abnormalities] : the abdominal aorta was not enlarged and no bruit was heard [No Pitting Edema] : no pitting edema present [Normal Rhythm/Effort] : normal respiratory rhythm and effort [Clear Bilaterally] : the lungs were clear to auscultation bilaterally [Normal to Percussion] : the lungs were normal to percussion [Normal] : palpation of the chest was normal [FreeTextEntry1] : walks with cane [S3] : no S3 [S4] : no S4 [Right Carotid Bruit] : no bruit heard over the right carotid [Left Carotid Bruit] : no bruit heard over the left carotid [Right Femoral Bruit] : no bruit heard over the right femoral artery [Left Femoral Bruit] : no bruit heard over the left femoral artery

## 2023-07-18 NOTE — DISCUSSION/SUMMARY
[FreeTextEntry1] : 1)Cardiac: \par 2D echo and nuclear stress test nl\par 2)HTN: BP at goal today\par -cont low-salt diet\par -cont current meds\par \par Pt cleared to proceed with bariatric surgery from a cardiac standpoint, without the need for any further cardiac testing at this time. [EKG obtained to assist in diagnosis and management of assessed problem(s)] : EKG obtained to assist in diagnosis and management of assessed problem(s)

## 2023-07-20 NOTE — DISCUSSION/SUMMARY
[EKG obtained to assist in diagnosis and management of assessed problem(s)] : EKG obtained to assist in diagnosis and management of assessed problem(s) [FreeTextEntry1] : 1)Cardiac: \par 2D echo and nuclear stress test nl\par 2)HTN: BP at goal today\par -cont low-salt diet\par -cont current meds\par \par Pt cleared to proceed with bariatric surgery from a cardiac standpoint, without the need for any further cardiac testing at this time.

## 2023-07-20 NOTE — PHYSICAL EXAM
[General Appearance - Well Developed] : well developed [Normal Appearance] : normal appearance [Well Groomed] : well groomed [General Appearance - Well Nourished] : well nourished [No Deformities] : no deformities [General Appearance - In No Acute Distress] : no acute distress [Normal Conjunctiva] : the conjunctiva exhibited no abnormalities [Eyelids - No Xanthelasma] : the eyelids demonstrated no xanthelasmas [Normal Oral Mucosa] : normal oral mucosa [No Oral Pallor] : no oral pallor [No Oral Cyanosis] : no oral cyanosis [Normal Jugular Venous A Waves Present] : normal jugular venous A waves present [Normal Jugular Venous V Waves Present] : normal jugular venous V waves present [No Jugular Venous Lee A Waves] : no jugular venous lee A waves [Abdomen Soft] : soft [Abdomen Tenderness] : non-tender [Abdomen Mass (___ Cm)] : no abdominal mass palpated [Nail Clubbing] : no clubbing of the fingernails [Cyanosis, Localized] : no localized cyanosis [Petechial Hemorrhages (___cm)] : no petechial hemorrhages [] : no rash [Skin Color & Pigmentation] : normal skin color and pigmentation [No Venous Stasis] : no venous stasis [Skin Lesions] : no skin lesions [No Skin Ulcers] : no skin ulcer [No Xanthoma] : no  xanthoma was observed [Oriented To Time, Place, And Person] : oriented to person, place, and time [Affect] : the affect was normal [Mood] : the mood was normal [No Anxiety] : not feeling anxious [Normal Rate] : normal [Normal S1] : normal S1 [Normal S2] : normal S2 [II] : a grade 2 [2+] : left 2+ [No Abnormalities] : the abdominal aorta was not enlarged and no bruit was heard [No Pitting Edema] : no pitting edema present [Normal Rhythm/Effort] : normal respiratory rhythm and effort [Clear Bilaterally] : the lungs were clear to auscultation bilaterally [Normal to Percussion] : the lungs were normal to percussion [Normal] : palpation of the chest was normal [FreeTextEntry1] : walks with cane [S3] : no S3 [S4] : no S4 [Right Carotid Bruit] : no bruit heard over the right carotid [Left Carotid Bruit] : no bruit heard over the left carotid [Right Femoral Bruit] : no bruit heard over the right femoral artery [Left Femoral Bruit] : no bruit heard over the left femoral artery

## 2023-08-01 ENCOUNTER — APPOINTMENT (OUTPATIENT)
Dept: BARIATRICS | Facility: CLINIC | Age: 58
End: 2023-08-01
Payer: COMMERCIAL

## 2023-08-01 VITALS
DIASTOLIC BLOOD PRESSURE: 82 MMHG | HEIGHT: 65.5 IN | BODY MASS INDEX: 48.23 KG/M2 | TEMPERATURE: 96.7 F | SYSTOLIC BLOOD PRESSURE: 146 MMHG | OXYGEN SATURATION: 99 % | HEART RATE: 86 BPM | WEIGHT: 293 LBS

## 2023-08-01 PROCEDURE — 99214 OFFICE O/P EST MOD 30 MIN: CPT

## 2023-08-01 RX ORDER — BETAMETHASONE DIPROPIONATE 0.5 MG/G
0.05 OINTMENT, AUGMENTED TOPICAL TWICE DAILY
Qty: 1 | Refills: 2 | Status: DISCONTINUED | COMMUNITY
Start: 2023-05-15 | End: 2023-08-01

## 2023-08-03 DIAGNOSIS — R06.09 OTHER FORMS OF DYSPNEA: ICD-10-CM

## 2023-08-03 NOTE — ASSESSMENT
[FreeTextEntry1] : 57-year-old woman s/p Laparoscopic Sleeve Gastrectomy presents for follow-up. Patient is on Phentermine (prescribed by OM); reports no side effects. Stopped phentermine for a month and attributes it to weight gain since last visit. Reports no abdominal pain, nausea/vomiting, constipation, diarrhea, reflux/heartburn.    Nutrition and exercise guidelines reviewed with the patient.   Continue 3 protein-focused meals/day (aim for 60 g - 70 g protein/day) Encourage zero calorie fluid intake (64 oz/day) Continue bariatric vitamins  Exercise with cardio (aim for 8k-10k steps/day), and strength training 2x/week Follow-up with Obesity medicine  Follow-up in 3 months All questions answered.  Call with any questions or concerns  Time before and after visit spent reviewing chart.

## 2023-08-03 NOTE — HISTORY OF PRESENT ILLNESS
[Procedure: ___] : Procedure performed: [unfilled]  [Date of Surgery: ___] : Date of Surgery:   [unfilled] [Surgeon Name:   ___] : Surgeon Name: Dr. MORALEZ [Pre-Op Weight ___] : Pre-op weight was [unfilled] lbs [de-identified] : 57-year-old woman s/p Laparoscopic Sleeve Gastrectomy presents for follow-up. Patient is on Phentermine (prescribed by OM); reports no side effects. Stopped phentermine for a month and attributes it to weight gain since last visit. Patient skips breakfast and eats 2 protein-rich meals/day, drinking only 16 Oz fluids/day, taking bariatric vitamins, and has stopped exercising due to lack of time and back pain. Reports no abdominal pain, nausea/vomiting, constipation, diarrhea, reflux/heartburn.   History of bariatric surgery: Laparoscopic Sleeve Gastrectomy  Obesity comorbidities: HTN, anxiety Comorbidities improved/resolved: None Anti-obesity medication: Phentermine Obesity medication side effects: Increased Blood pressure at last visit

## 2023-08-03 NOTE — PHYSICAL EXAM
[Obese, well nourished, in no acute distress] : obese, well nourished, in no acute distress [Normal] : PERRL, EOMI, no conjunctival infection, anicteric [de-identified] : obese, soft, nontender, no evidence of hernia

## 2023-08-11 NOTE — REASON FOR VISIT
[Follow-Up Visit] : a follow-up visit for normal affect/alert and oriented x3/normal behavior [Morbid Obesity (BMI>40)] : morbid obesity (bmi>40) [S/P Bariatric Surgery] : s/p bariatric surgery [Other Location: e.g. School (Enter Location, City,State)___] : at [unfilled], at the time of the visit. [Other Location: e.g. Home (Enter Location, City,State)___] : at [unfilled] [Patient] : the patient [Patient Understands Data May be Shared] : patient understands that the information discussed during the evaluation would be shared with referring provider and possibly with ~his/her~ insurance provider [de-identified] : Confidentiality and its limitations were explained.

## 2023-08-14 ENCOUNTER — APPOINTMENT (OUTPATIENT)
Dept: INTERNAL MEDICINE | Facility: CLINIC | Age: 58
End: 2023-08-14
Payer: COMMERCIAL

## 2023-08-14 VITALS — SYSTOLIC BLOOD PRESSURE: 148 MMHG | DIASTOLIC BLOOD PRESSURE: 82 MMHG

## 2023-08-14 VITALS — HEART RATE: 108 BPM | OXYGEN SATURATION: 98 % | TEMPERATURE: 98 F | WEIGHT: 293 LBS | BODY MASS INDEX: 51.62 KG/M2

## 2023-08-14 PROCEDURE — 99214 OFFICE O/P EST MOD 30 MIN: CPT

## 2023-08-14 RX ORDER — LEVOTHYROXINE SODIUM 0.17 MG/1
175 TABLET ORAL
Qty: 90 | Refills: 0 | Status: ACTIVE | COMMUNITY
Start: 2020-04-20 | End: 1900-01-01

## 2023-08-14 RX ORDER — LEVOTHYROXINE SODIUM 0.2 MG/1
200 TABLET ORAL
Qty: 90 | Refills: 0 | Status: ACTIVE | COMMUNITY
Start: 2017-05-10 | End: 1900-01-01

## 2023-08-14 NOTE — PHYSICAL EXAM
[No Acute Distress] : no acute distress [Normal Sclera/Conjunctiva] : normal sclera/conjunctiva [Normal Outer Ear/Nose] : the outer ears and nose were normal in appearance [No JVD] : no jugular venous distention [No Lymphadenopathy] : no lymphadenopathy [No Respiratory Distress] : no respiratory distress  [No Accessory Muscle Use] : no accessory muscle use [Clear to Auscultation] : lungs were clear to auscultation bilaterally [Normal Rate] : normal rate  [Regular Rhythm] : with a regular rhythm [No Extremity Clubbing/Cyanosis] : no extremity clubbing/cyanosis [Soft] : abdomen soft [Non Tender] : non-tender [Non-distended] : non-distended [Normal Bowel Sounds] : normal bowel sounds [Grossly Normal Strength/Tone] : grossly normal strength/tone [No Focal Deficits] : no focal deficits [Speech Grossly Normal] : speech grossly normal [Memory Grossly Normal] : memory grossly normal [Normal Mood] : the mood was normal

## 2023-08-14 NOTE — PLAN
[FreeTextEntry1] : Pt does not want to increase telmisartan as 80 mg made her dizzy  Pt currently minimally agreeable to CT of chest for right sided chest pain and heavy smoking hx   Complex decision making  Pt had double espresso and a cigarette prior to entering office

## 2023-08-14 NOTE — HEALTH RISK ASSESSMENT
[0] : 2) Feeling down, depressed, or hopeless: Not at all (0) [PHQ-2 Negative - No further assessment needed] : PHQ-2 Negative - No further assessment needed [[id number]] : 0 [Current] : Current [20 or more] : 20 or more

## 2023-08-14 NOTE — HISTORY OF PRESENT ILLNESS
[de-identified] : Pt comes for bp recheck and rt chest discomfort for 5 months  Didnt want to tell me because she smokes way too much.  Seeing Dr Rivera for weight management  Last bp was 140/ 82

## 2023-08-14 NOTE — REVIEW OF SYSTEMS
[Fever] : no fever [Vision Problems] : no vision problems [Sore Throat] : no sore throat [Chest Pain] : chest pain [Palpitations] : no palpitations [Lower Ext Edema] : no lower extremity edema [Shortness Of Breath] : no shortness of breath [Wheezing] : no wheezing [Cough] : no cough [Dyspnea on Exertion] : dyspnea on exertion [Abdominal Pain] : no abdominal pain [Dysuria] : no dysuria [Joint Pain] : joint pain [Joint Stiffness] : joint stiffness [Muscle Pain] : muscle pain [Back Pain] : back pain

## 2023-08-21 ENCOUNTER — APPOINTMENT (OUTPATIENT)
Dept: CARDIOLOGY | Facility: CLINIC | Age: 58
End: 2023-08-21
Payer: COMMERCIAL

## 2023-08-21 PROCEDURE — 93306 TTE W/DOPPLER COMPLETE: CPT

## 2023-08-23 ENCOUNTER — APPOINTMENT (OUTPATIENT)
Dept: CT IMAGING | Facility: CLINIC | Age: 58
End: 2023-08-23

## 2023-08-28 ENCOUNTER — RX RENEWAL (OUTPATIENT)
Age: 58
End: 2023-08-28

## 2023-09-05 ENCOUNTER — APPOINTMENT (OUTPATIENT)
Dept: INTERNAL MEDICINE | Facility: CLINIC | Age: 58
End: 2023-09-05
Payer: COMMERCIAL

## 2023-09-05 PROCEDURE — 99213 OFFICE O/P EST LOW 20 MIN: CPT | Mod: 95

## 2023-09-05 NOTE — HISTORY OF PRESENT ILLNESS
[Home] : at home, [unfilled] , at the time of the visit. [Medical Office: ([facility])___] : at the medical office located in  [Verbal consent obtained from patient] : the patient, [unfilled] [de-identified] : Pt asks for teb to review ct lung ca scrren  PT still smoking  Pt also states that she decreased losartan to 1/2 of 100 mg pill as she was lightheaded

## 2023-09-05 NOTE — PHYSICAL EXAM
[No Acute Distress] : no acute distress [Normal Sclera/Conjunctiva] : normal sclera/conjunctiva [Normal Outer Ear/Nose] : the outer ears and nose were normal in appearance [No JVD] : no jugular venous distention [No Respiratory Distress] : no respiratory distress  [Memory Grossly Normal] : memory grossly normal [Normal Affect] : the affect was normal

## 2023-09-06 ENCOUNTER — APPOINTMENT (OUTPATIENT)
Dept: CT IMAGING | Facility: CLINIC | Age: 58
End: 2023-09-06

## 2023-09-11 ENCOUNTER — APPOINTMENT (OUTPATIENT)
Dept: BARIATRICS/WEIGHT MGMT | Facility: CLINIC | Age: 58
End: 2023-09-11
Payer: COMMERCIAL

## 2023-09-11 ENCOUNTER — OUTPATIENT (OUTPATIENT)
Dept: OUTPATIENT SERVICES | Facility: HOSPITAL | Age: 58
LOS: 1 days | End: 2023-09-11
Payer: COMMERCIAL

## 2023-09-11 ENCOUNTER — MED ADMIN CHARGE (OUTPATIENT)
Age: 58
End: 2023-09-11

## 2023-09-11 ENCOUNTER — APPOINTMENT (OUTPATIENT)
Dept: ORTHOPEDIC SURGERY | Facility: CLINIC | Age: 58
End: 2023-09-11
Payer: COMMERCIAL

## 2023-09-11 VITALS — WEIGHT: 293 LBS | BODY MASS INDEX: 50.8 KG/M2

## 2023-09-11 DIAGNOSIS — Z98.890 OTHER SPECIFIED POSTPROCEDURAL STATES: Chronic | ICD-10-CM

## 2023-09-11 DIAGNOSIS — I10 ESSENTIAL (PRIMARY) HYPERTENSION: ICD-10-CM

## 2023-09-11 DIAGNOSIS — K59.09 OTHER CONSTIPATION: ICD-10-CM

## 2023-09-11 DIAGNOSIS — Z90.3 ACQUIRED ABSENCE OF STOMACH [PART OF]: Chronic | ICD-10-CM

## 2023-09-11 PROCEDURE — G0463: CPT

## 2023-09-11 PROCEDURE — 99214 OFFICE O/P EST MOD 30 MIN: CPT | Mod: 95

## 2023-09-11 PROCEDURE — 99212 OFFICE O/P EST SF 10 MIN: CPT | Mod: 25

## 2023-09-11 PROCEDURE — 20610 DRAIN/INJ JOINT/BURSA W/O US: CPT | Mod: 50

## 2023-09-11 RX ORDER — LIDOCAINE HYDROCHLORIDE 10 MG/ML
1 INJECTION, SOLUTION INFILTRATION; PERINEURAL
Refills: 0 | Status: COMPLETED | OUTPATIENT
Start: 2023-09-11

## 2023-09-11 RX ORDER — METHYLPRED ACET/NACL,ISO-OS/PF 40 MG/ML
40 VIAL (ML) INJECTION
Qty: 1 | Refills: 0 | Status: COMPLETED | OUTPATIENT
Start: 2023-09-11

## 2023-09-11 RX ADMIN — METHYLPREDNISOLONE ACETATE MG/ML: 40 INJECTION, SUSPENSION INTRA-ARTICULAR; INTRALESIONAL; INTRAMUSCULAR; SOFT TISSUE at 00:00

## 2023-09-11 RX ADMIN — Medication %: at 00:00

## 2023-09-13 DIAGNOSIS — Z94.84 STEM CELLS TRANSPLANT STATUS: ICD-10-CM

## 2023-09-13 DIAGNOSIS — E66.01 MORBID (SEVERE) OBESITY DUE TO EXCESS CALORIES: ICD-10-CM

## 2023-09-13 DIAGNOSIS — K59.09 OTHER CONSTIPATION: ICD-10-CM

## 2023-09-13 DIAGNOSIS — G47.00 INSOMNIA, UNSPECIFIED: ICD-10-CM

## 2023-09-13 DIAGNOSIS — M19.90 UNSPECIFIED OSTEOARTHRITIS, UNSPECIFIED SITE: ICD-10-CM

## 2023-09-13 DIAGNOSIS — E03.9 HYPOTHYROIDISM, UNSPECIFIED: ICD-10-CM

## 2023-09-18 ENCOUNTER — APPOINTMENT (OUTPATIENT)
Dept: CARDIOLOGY | Facility: CLINIC | Age: 58
End: 2023-09-18
Payer: COMMERCIAL

## 2023-09-18 VITALS
SYSTOLIC BLOOD PRESSURE: 144 MMHG | DIASTOLIC BLOOD PRESSURE: 90 MMHG | OXYGEN SATURATION: 98 % | BODY MASS INDEX: 47.09 KG/M2 | HEART RATE: 79 BPM | HEIGHT: 66 IN | WEIGHT: 293 LBS

## 2023-09-18 DIAGNOSIS — R07.89 OTHER CHEST PAIN: ICD-10-CM

## 2023-09-18 PROCEDURE — 99214 OFFICE O/P EST MOD 30 MIN: CPT

## 2023-09-18 RX ORDER — TELMISARTAN 40 MG/1
40 TABLET ORAL DAILY
Qty: 90 | Refills: 3 | Status: DISCONTINUED | COMMUNITY
Start: 2023-05-08 | End: 2023-09-18

## 2023-09-18 RX ORDER — LOSARTAN POTASSIUM 100 MG/1
100 TABLET, FILM COATED ORAL DAILY
Qty: 90 | Refills: 0 | Status: DISCONTINUED | COMMUNITY
Start: 2023-08-28 | End: 2023-09-18

## 2023-09-29 ENCOUNTER — APPOINTMENT (OUTPATIENT)
Dept: INTERNAL MEDICINE | Facility: CLINIC | Age: 58
End: 2023-09-29
Payer: COMMERCIAL

## 2023-09-29 PROCEDURE — 99213 OFFICE O/P EST LOW 20 MIN: CPT | Mod: 95

## 2023-09-29 RX ORDER — FLUTICASONE PROPIONATE 50 UG/1
50 SPRAY, METERED NASAL DAILY
Qty: 1 | Refills: 5 | Status: ACTIVE | COMMUNITY
Start: 2023-09-29 | End: 1900-01-01

## 2023-11-06 ENCOUNTER — APPOINTMENT (OUTPATIENT)
Dept: INTERNAL MEDICINE | Facility: CLINIC | Age: 58
End: 2023-11-06
Payer: COMMERCIAL

## 2023-11-06 VITALS — HEIGHT: 66 IN | OXYGEN SATURATION: 99 % | WEIGHT: 293 LBS | BODY MASS INDEX: 47.09 KG/M2 | HEART RATE: 97 BPM

## 2023-11-06 VITALS — WEIGHT: 293 LBS | DIASTOLIC BLOOD PRESSURE: 84 MMHG | BODY MASS INDEX: 49.23 KG/M2 | SYSTOLIC BLOOD PRESSURE: 144 MMHG

## 2023-11-06 DIAGNOSIS — Z23 ENCOUNTER FOR IMMUNIZATION: ICD-10-CM

## 2023-11-06 PROCEDURE — 36415 COLL VENOUS BLD VENIPUNCTURE: CPT

## 2023-11-06 PROCEDURE — 99214 OFFICE O/P EST MOD 30 MIN: CPT | Mod: 25

## 2023-11-06 PROCEDURE — 90686 IIV4 VACC NO PRSV 0.5 ML IM: CPT

## 2023-11-06 PROCEDURE — G0008: CPT

## 2023-11-07 ENCOUNTER — APPOINTMENT (OUTPATIENT)
Dept: BARIATRICS | Facility: CLINIC | Age: 58
End: 2023-11-07
Payer: COMMERCIAL

## 2023-11-07 VITALS
TEMPERATURE: 96.7 F | BODY MASS INDEX: 48.23 KG/M2 | OXYGEN SATURATION: 98 % | HEIGHT: 65.5 IN | DIASTOLIC BLOOD PRESSURE: 78 MMHG | SYSTOLIC BLOOD PRESSURE: 140 MMHG | HEART RATE: 87 BPM | WEIGHT: 293 LBS

## 2023-11-07 PROCEDURE — 99214 OFFICE O/P EST MOD 30 MIN: CPT

## 2023-11-08 ENCOUNTER — NON-APPOINTMENT (OUTPATIENT)
Age: 58
End: 2023-11-08

## 2023-11-09 LAB
ALBUMIN SERPL ELPH-MCNC: 4.1 G/DL
ALP BLD-CCNC: 115 U/L
ALT SERPL-CCNC: 17 U/L
ANION GAP SERPL CALC-SCNC: 13 MMOL/L
AST SERPL-CCNC: 16 U/L
BASOPHILS # BLD AUTO: 0.04 K/UL
BASOPHILS NFR BLD AUTO: 0.4 %
BILIRUB SERPL-MCNC: 0.3 MG/DL
BUN SERPL-MCNC: 19 MG/DL
CALCIUM SERPL-MCNC: 10.1 MG/DL
CHLORIDE SERPL-SCNC: 103 MMOL/L
CHOLEST SERPL-MCNC: 207 MG/DL
CO2 SERPL-SCNC: 27 MMOL/L
CREAT SERPL-MCNC: 0.8 MG/DL
EGFR: 85 ML/MIN/1.73M2
EOSINOPHIL # BLD AUTO: 0.33 K/UL
EOSINOPHIL NFR BLD AUTO: 3.6 %
ESTIMATED AVERAGE GLUCOSE: 117 MG/DL
GLUCOSE SERPL-MCNC: 135 MG/DL
HBA1C MFR BLD HPLC: 5.7 %
HCT VFR BLD CALC: 49.1 %
HDLC SERPL-MCNC: 68 MG/DL
HGB BLD-MCNC: 15.6 G/DL
IMM GRANULOCYTES NFR BLD AUTO: 0.2 %
LDLC SERPL CALC-MCNC: 114 MG/DL
LYMPHOCYTES # BLD AUTO: 2.44 K/UL
LYMPHOCYTES NFR BLD AUTO: 26.7 %
MAN DIFF?: NORMAL
MCHC RBC-ENTMCNC: 31.2 PG
MCHC RBC-ENTMCNC: 31.8 GM/DL
MCV RBC AUTO: 98.2 FL
MONOCYTES # BLD AUTO: 0.6 K/UL
MONOCYTES NFR BLD AUTO: 6.6 %
NEUTROPHILS # BLD AUTO: 5.7 K/UL
NEUTROPHILS NFR BLD AUTO: 62.5 %
NONHDLC SERPL-MCNC: 139 MG/DL
PLATELET # BLD AUTO: 312 K/UL
POTASSIUM SERPL-SCNC: 4.7 MMOL/L
PROT SERPL-MCNC: 6.6 G/DL
RBC # BLD: 5 M/UL
RBC # FLD: 13.8 %
SODIUM SERPL-SCNC: 143 MMOL/L
TRIGL SERPL-MCNC: 142 MG/DL
TSH SERPL-ACNC: 2.62 UIU/ML
WBC # FLD AUTO: 9.13 K/UL

## 2023-11-13 ENCOUNTER — OUTPATIENT (OUTPATIENT)
Dept: OUTPATIENT SERVICES | Facility: HOSPITAL | Age: 58
LOS: 1 days | End: 2023-11-13
Payer: COMMERCIAL

## 2023-11-13 ENCOUNTER — APPOINTMENT (OUTPATIENT)
Dept: BARIATRICS/WEIGHT MGMT | Facility: CLINIC | Age: 58
End: 2023-11-13
Payer: COMMERCIAL

## 2023-11-13 VITALS — WEIGHT: 293 LBS | BODY MASS INDEX: 49.49 KG/M2

## 2023-11-13 DIAGNOSIS — Z90.89 ACQUIRED ABSENCE OF OTHER ORGANS: Chronic | ICD-10-CM

## 2023-11-13 DIAGNOSIS — Z98.890 OTHER SPECIFIED POSTPROCEDURAL STATES: Chronic | ICD-10-CM

## 2023-11-13 DIAGNOSIS — Z90.3 ACQUIRED ABSENCE OF STOMACH [PART OF]: Chronic | ICD-10-CM

## 2023-11-13 PROCEDURE — 99214 OFFICE O/P EST MOD 30 MIN: CPT | Mod: 95

## 2023-11-13 PROCEDURE — G0463: CPT

## 2023-11-14 DIAGNOSIS — I10 ESSENTIAL (PRIMARY) HYPERTENSION: ICD-10-CM

## 2023-11-17 DIAGNOSIS — Z98.84 BARIATRIC SURGERY STATUS: ICD-10-CM

## 2023-11-17 DIAGNOSIS — E66.01 MORBID (SEVERE) OBESITY DUE TO EXCESS CALORIES: ICD-10-CM

## 2023-12-13 RX ORDER — DICLOFENAC SODIUM 75 MG/1
75 TABLET, DELAYED RELEASE ORAL
Qty: 20 | Refills: 0 | Status: ACTIVE | COMMUNITY
Start: 2023-02-13 | End: 1900-01-01

## 2024-01-08 ENCOUNTER — APPOINTMENT (OUTPATIENT)
Dept: INTERNAL MEDICINE | Facility: CLINIC | Age: 59
End: 2024-01-08
Payer: COMMERCIAL

## 2024-01-08 PROCEDURE — 99213 OFFICE O/P EST LOW 20 MIN: CPT | Mod: 95

## 2024-01-08 NOTE — HISTORY OF PRESENT ILLNESS
[Home] : at home, [unfilled] , at the time of the visit. [Medical Office: ([facility])___] : at the medical office located in  [Verbal consent obtained from patient] : the patient, [unfilled] [de-identified] : Pt asks for teb for med renewal  Feels well except for knee pain and has not had time to see surgeon

## 2024-01-08 NOTE — PHYSICAL EXAM
[No Acute Distress] : no acute distress [Normal Sclera/Conjunctiva] : normal sclera/conjunctiva [Normal Outer Ear/Nose] : the outer ears and nose were normal in appearance [No Respiratory Distress] : no respiratory distress  [No JVD] : no jugular venous distention [Normal Affect] : the affect was normal [Normal Insight/Judgement] : insight and judgment were intact

## 2024-01-17 ENCOUNTER — APPOINTMENT (OUTPATIENT)
Dept: BARIATRICS/WEIGHT MGMT | Facility: CLINIC | Age: 59
End: 2024-01-17

## 2024-01-23 ENCOUNTER — APPOINTMENT (OUTPATIENT)
Dept: BARIATRICS | Facility: CLINIC | Age: 59
End: 2024-01-23
Payer: COMMERCIAL

## 2024-01-23 VITALS
BODY MASS INDEX: 48.23 KG/M2 | DIASTOLIC BLOOD PRESSURE: 74 MMHG | TEMPERATURE: 96.7 F | HEART RATE: 104 BPM | WEIGHT: 293 LBS | OXYGEN SATURATION: 99 % | HEIGHT: 65.5 IN | SYSTOLIC BLOOD PRESSURE: 140 MMHG

## 2024-01-23 DIAGNOSIS — R79.9 ABNORMAL FINDING OF BLOOD CHEMISTRY, UNSPECIFIED: ICD-10-CM

## 2024-01-23 DIAGNOSIS — Z76.89 PERSONS ENCOUNTERING HEALTH SERVICES IN OTHER SPECIFIED CIRCUMSTANCES: ICD-10-CM

## 2024-01-23 DIAGNOSIS — Z72.3 LACK OF PHYSICAL EXERCISE: ICD-10-CM

## 2024-01-23 PROCEDURE — 99215 OFFICE O/P EST HI 40 MIN: CPT

## 2024-01-23 NOTE — ASSESSMENT
[FreeTextEntry1] : 58 year old woman s/p Laparoscopic sleeve gastrectomy presents for follow-up. 2 lb weight loss since last visit; on Phentermine 37.5 mg (by OM Dr. Villanueva). Pt is interested in switching to Zepbound for improved weight loss.  Overall doing well.  Nutrition and exercise guidelines reviewed with the patient.  Encourage 3 protein-focused meals/day (aim for 60 g - 70 g protein/day) Increase zero calorie fluid intake (64 oz/day) Stress reduction modalities - improve sleep Continue bariatric vitamins - bariatric vitamin daily requirement list given to pt Start exercise with cardio (aim for 8k-10k steps/day) and continue strength training 2-3x/week with resistance bands Use step counter Weigh yourself 1x-2x/week  Labs ordered to be done before next visit Last labs by PCP - 11/6/2023 HbA1c 5.7%, gluc 135; last vitamin levels 3/15/2023 normal   Return to office in 2 months Continue Phentermine 37.5mg daily for now - will reach out to Dr. Villanueva for continuity of care  Start Zepbound and await for prior authorization - risks/benefits/alternatives discussed Call the office for concerns/issues and/or any side effects. All questions answered     Time before and after visit spent reviewing chart

## 2024-01-23 NOTE — HISTORY OF PRESENT ILLNESS
[Procedure: ___] : Procedure performed: [unfilled]  [Date of Surgery: ___] : Date of Surgery:   [unfilled] [Surgeon Name:   ___] : Surgeon Name: Dr. MORALEZ [Pre-Op Weight ___] : Pre-op weight was [unfilled] lbs [de-identified] : 58 year old woman s/p Laparoscopic sleeve gastrectomy presents for follow-up. 2 lb weight loss since last visit; on Phentermine 37.5 mg (by OM Dr. Villanueva); reports no side effects. Pt is interested in switching to Zepbound for improved weight loss. Reports no nausea/vomiting, constipation, diarrhea, reflux/heartburn; reports some incisional pain to LUQ. Patient eating 2 protein-rich meals/day (trying to incorporate adequate protein into third meal), trying to drink adequate zero-calorie fluids (reports ~24 oz water/day), taking bariatric vitamins (Vitafusion with Calcium Citrate), and walking for exercise (exercise limited by b/l knee OA); reports 5-6 hrs of sleep/night due to stress. Pt works as a ; works 10 hours/day (mostly sedentary job).   Pt adds sporadic LUQ incisional soreness since sleeve surgery - was told that incision site possible with calcium deposits and would indicate scar revision, however pt states she does not have the time for the procedure.  Weight Loss Medication Screening: Reports no history of substance abuse, significant anxiety, uncontrolled blood pressure, kidney stones, or pancreatitis. Reports no family history thyroid cancer or MEN 2 syndrome.   Anti-Obesity Medication(s): Phentermine Start Date: 12/13/23 Current Dose: Phentermine 37.5 mg Side-Effects from Anti-Obesity Medication(s): None Obesity Comorbidities: HTN Comorbidities Improved/Resolved: N/A History of Bariatric Surgery: Laparoscopic Sleeve Gastrectomy

## 2024-01-23 NOTE — REVIEW OF SYSTEMS
[Patient Intake Form Reviewed] : Patient intake form was reviewed [Negative] : Allergic/Immunologic [FreeTextEntry7] : incisional pain - see HPI

## 2024-01-23 NOTE — PHYSICAL EXAM
[Obese, well nourished, in no acute distress] : obese, well nourished, in no acute distress [Normal] : full range of motion and no deformities appreciated [de-identified] : Well, healthy appearing adult  [de-identified] : EOMI, no conjunctival injection, anicteric  [de-identified] : No visualized JVD or audible bruits [de-identified] : Equal chest rise, non-labored respirations, no audible wheezing  [de-identified] : Regular rate, no audible carotid bruits or JVD appreciated  [de-identified] : soft, NT, ND, no evidence of hernia or diastasis, incisions well-healed LUQ incision 3/4 cm well healed with center punctum, minimal induration secondary to scar tissue but no erythema, swelling or drainage [de-identified] : ANA

## 2024-01-24 ENCOUNTER — NON-APPOINTMENT (OUTPATIENT)
Age: 59
End: 2024-01-24

## 2024-01-25 ENCOUNTER — TRANSCRIPTION ENCOUNTER (OUTPATIENT)
Age: 59
End: 2024-01-25

## 2024-01-31 ENCOUNTER — APPOINTMENT (OUTPATIENT)
Dept: ORTHOPEDIC SURGERY | Facility: CLINIC | Age: 59
End: 2024-01-31
Payer: COMMERCIAL

## 2024-01-31 VITALS
SYSTOLIC BLOOD PRESSURE: 144 MMHG | HEART RATE: 91 BPM | DIASTOLIC BLOOD PRESSURE: 98 MMHG | HEIGHT: 66.5 IN | BODY MASS INDEX: 46.53 KG/M2 | WEIGHT: 293 LBS

## 2024-01-31 DIAGNOSIS — M17.11 UNILATERAL PRIMARY OSTEOARTHRITIS, RIGHT KNEE: ICD-10-CM

## 2024-01-31 DIAGNOSIS — M17.12 UNILATERAL PRIMARY OSTEOARTHRITIS, LEFT KNEE: ICD-10-CM

## 2024-01-31 PROCEDURE — 20610 DRAIN/INJ JOINT/BURSA W/O US: CPT | Mod: 50

## 2024-01-31 PROCEDURE — 99213 OFFICE O/P EST LOW 20 MIN: CPT | Mod: 25

## 2024-01-31 NOTE — PHYSICAL EXAM
[Antalgic] : antalgic [Walker] : ambulates with walker [LE] : Sensory: Intact in bilateral lower extremities [DP] : dorsalis pedis 2+ and symmetric bilaterally [Knee Swelling Right] : no swelling [Knee Tenderness On Palpation Right] : tenderness [Knee Motion Right Crepitus] : crepitus with ROM [Knee Motion Right] : limited ROM [Knee Swelling Left] : no swelling [Knee Tenderness On Palpation Left] : tenderness [Knee Motion Left] : full ROM [Knee Motion Left Crepitus] : crepitus with ROM [de-identified] : Patient refused Xrays - she had 09/2023

## 2024-01-31 NOTE — DISCUSSION/SUMMARY
[Medication Risks Reviewed] : Medication risks reviewed [PRN] : PRN [de-identified] : The patient was advised on the activities for today. I gave the patient instructions on postinjection ice and analgesia. The patient had her questions answered and gave a clear understanding of the instructions. She was advised she can call the office at any time with any questions or concerns  The patient will follow up as needed. My cumulative time spent on this patient's visit included: Preparation for the visit, review of the medical records, review of pertinent diagnostic studies, examination and counseling of the patient on the above diagnosis, treatment plan and prognosis, orders of diagnostic tests, medications and/or appropriate procedures and documentation in the medical records of today's visit.  Not including time spent for procedures

## 2024-01-31 NOTE — PROCEDURE
[Injection] : Injection [Bilateral] : of bilateral [Knee Joint] : knee joint [Osteoarthritis] : Osteoarthritis [Joint Pain] : Joint pain [Patient] : patient [Risk] : risk [Benefits] : benefits [Bleeding] : bleeding [Infection] : infection [Allergic Reaction] : allergic reaction [Verbal Consent Obtained] : verbal consent was obtained prior to the procedure [Ethyl Chloride Spray] : ethyl chloride spray was used as a topical anesthetic [Lateral] : lateral [22] : a 22-gauge [1% Lidocaine___(mL)] : [unfilled] mL of 1% Lidocaine [Methylpred. 40mg/mL___(mL)] : [unfilled] mL of 40mg/mL methylprednisolone [Bandage Applied] : a bandage [Tolerated Well] : The patient tolerated the procedure well [None] : none [No Strenuous Activity___day(s)] : avoid strenuous activity for [unfilled] day(s) [PRN] : as needed [FreeTextEntry1] : chlorhexadine

## 2024-01-31 NOTE — HISTORY OF PRESENT ILLNESS
[de-identified] : The patient is a 58 yr old female presents today for evaluation of bilateral knees and a cortisone injection. She reports she is experiencing pain to bilateral knees right > left. She reports she twist the right knee going up the stairs yesterday and is experiencing 8/10 pain. Left knee 4/10 pain. She reports she buckle with bilateral knees at times. She has been using a walker for ambulation since yesterday, but uses a cane for ambulation always. She uses diclofenac and/or lidocaine patches with some relief. The patient reports she is leaving for a vacation and would like a cortisone injection to bilateral knees to temporize her symptoms. She has had cortisone in the past with good relief. [Pain Location] : pain [] : right & left knee [Standing] : standing [Lifting] : lifting [Daily] : ~He/She~ states the symptoms seem to be occuring daily [Bending] : worsened by bending [Direct Pressure] : worsened by direct pressure [Walking] : worsened by walking [Knee Flexion] : worsened with knee flexion [Knee Extension] : worsened with knee extension [Ice] : relieved by ice [NSAIDs] : relieved by nonsteroidal anti-inflammatory drugs [Rest] : relieved by rest

## 2024-01-31 NOTE — REASON FOR VISIT
[Follow-Up Visit] : a follow-up visit for [Knee Pain] : knee pain [FreeTextEntry2] : right and left knee pain

## 2024-02-05 ENCOUNTER — APPOINTMENT (OUTPATIENT)
Dept: INTERNAL MEDICINE | Facility: CLINIC | Age: 59
End: 2024-02-05

## 2024-02-20 ENCOUNTER — NON-APPOINTMENT (OUTPATIENT)
Age: 59
End: 2024-02-20

## 2024-02-20 RX ORDER — TIRZEPATIDE 2.5 MG/.5ML
2.5 INJECTION, SOLUTION SUBCUTANEOUS
Qty: 4 | Refills: 0 | Status: DISCONTINUED | COMMUNITY
Start: 2024-01-23 | End: 2024-02-20

## 2024-02-26 ENCOUNTER — APPOINTMENT (OUTPATIENT)
Dept: INTERNAL MEDICINE | Facility: CLINIC | Age: 59
End: 2024-02-26
Payer: COMMERCIAL

## 2024-02-26 VITALS — DIASTOLIC BLOOD PRESSURE: 88 MMHG | SYSTOLIC BLOOD PRESSURE: 140 MMHG

## 2024-02-26 VITALS — BODY MASS INDEX: 46.53 KG/M2 | HEIGHT: 66.5 IN | WEIGHT: 293 LBS | OXYGEN SATURATION: 96 % | HEART RATE: 108 BPM

## 2024-02-26 DIAGNOSIS — K91.2 POSTSURGICAL MALABSORPTION, NOT ELSEWHERE CLASSIFIED: ICD-10-CM

## 2024-02-26 DIAGNOSIS — Z90.3 POSTSURGICAL MALABSORPTION, NOT ELSEWHERE CLASSIFIED: ICD-10-CM

## 2024-02-26 DIAGNOSIS — M19.90 UNSPECIFIED OSTEOARTHRITIS, UNSPECIFIED SITE: ICD-10-CM

## 2024-02-26 PROCEDURE — 36415 COLL VENOUS BLD VENIPUNCTURE: CPT

## 2024-02-26 PROCEDURE — 99214 OFFICE O/P EST MOD 30 MIN: CPT

## 2024-02-26 PROCEDURE — G2211 COMPLEX E/M VISIT ADD ON: CPT

## 2024-02-26 NOTE — PHYSICAL EXAM
[No Acute Distress] : no acute distress [Normal Sclera/Conjunctiva] : normal sclera/conjunctiva [Normal Outer Ear/Nose] : the outer ears and nose were normal in appearance [No JVD] : no jugular venous distention [No Lymphadenopathy] : no lymphadenopathy [No Respiratory Distress] : no respiratory distress  [No Accessory Muscle Use] : no accessory muscle use [Clear to Auscultation] : lungs were clear to auscultation bilaterally [Normal Rate] : normal rate  [Regular Rhythm] : with a regular rhythm [No Extremity Clubbing/Cyanosis] : no extremity clubbing/cyanosis [Soft] : abdomen soft [Non Tender] : non-tender [Non-distended] : non-distended [de-identified] : no pedal edema

## 2024-02-26 NOTE — HEALTH RISK ASSESSMENT
[Yes] : Yes [Monthly or less (1 pt)] : Monthly or less (1 point) [1 or 2 (0 pts)] : 1 or 2 (0 points) [Never (0 pts)] : Never (0 points) [No] : In the past 12 months have you used drugs other than those required for medical reasons? No [No falls in past year] : Patient reported no falls in the past year [0] : 2) Feeling down, depressed, or hopeless: Not at all (0) [PHQ-2 Negative - No further assessment needed] : PHQ-2 Negative - No further assessment needed [Audit-CScore] : 1 [[id number]] : 0 [Current] : Current

## 2024-02-26 NOTE — PLAN
[FreeTextEntry1] : labs drawn  discussed bpp  Perhaps weight loss will help that  complex decision making

## 2024-02-26 NOTE — HISTORY OF PRESENT ILLNESS
[de-identified] : Pt comes for BFW and med renewal  Pt has labs that need to be done for bariatrics  Pt just started zepbound

## 2024-02-28 LAB
25(OH)D3 SERPL-MCNC: 42.7 NG/ML
ALBUMIN SERPL ELPH-MCNC: 4.3 G/DL
ALP BLD-CCNC: 112 U/L
ALT SERPL-CCNC: 21 U/L
ANION GAP SERPL CALC-SCNC: 18 MMOL/L
AST SERPL-CCNC: 18 U/L
BASOPHILS # BLD AUTO: 0.06 K/UL
BASOPHILS NFR BLD AUTO: 0.6 %
BILIRUB SERPL-MCNC: 0.3 MG/DL
BUN SERPL-MCNC: 18 MG/DL
CALCIUM SERPL-MCNC: 9.7 MG/DL
CHLORIDE SERPL-SCNC: 104 MMOL/L
CHOLEST SERPL-MCNC: 215 MG/DL
CO2 SERPL-SCNC: 18 MMOL/L
CREAT SERPL-MCNC: 0.84 MG/DL
EGFR: 80 ML/MIN/1.73M2
EOSINOPHIL # BLD AUTO: 0.37 K/UL
EOSINOPHIL NFR BLD AUTO: 3.5 %
ESTIMATED AVERAGE GLUCOSE: 108 MG/DL
FOLATE SERPL-MCNC: >20 NG/ML
GLUCOSE SERPL-MCNC: 113 MG/DL
HBA1C MFR BLD HPLC: 5.4 %
HCT VFR BLD CALC: 45 %
HDLC SERPL-MCNC: 60 MG/DL
HGB BLD-MCNC: 15 G/DL
IMM GRANULOCYTES NFR BLD AUTO: 0.3 %
IRON SERPL-MCNC: 97 UG/DL
LDLC SERPL CALC-MCNC: 126 MG/DL
LYMPHOCYTES # BLD AUTO: 2.3 K/UL
LYMPHOCYTES NFR BLD AUTO: 21.7 %
MAN DIFF?: NORMAL
MCHC RBC-ENTMCNC: 31.6 PG
MCHC RBC-ENTMCNC: 33.3 GM/DL
MCV RBC AUTO: 94.9 FL
MONOCYTES # BLD AUTO: 0.81 K/UL
MONOCYTES NFR BLD AUTO: 7.6 %
NEUTROPHILS # BLD AUTO: 7.04 K/UL
NEUTROPHILS NFR BLD AUTO: 66.3 %
NONHDLC SERPL-MCNC: 155 MG/DL
PLATELET # BLD AUTO: 352 K/UL
POTASSIUM SERPL-SCNC: 4.5 MMOL/L
PROT SERPL-MCNC: 6.5 G/DL
RBC # BLD: 4.74 M/UL
RBC # FLD: 13.8 %
SODIUM SERPL-SCNC: 140 MMOL/L
TRIGL SERPL-MCNC: 166 MG/DL
TSH SERPL-ACNC: 2.79 UIU/ML
VIT B12 SERPL-MCNC: 1528 PG/ML
WBC # FLD AUTO: 10.61 K/UL

## 2024-03-10 LAB — VIT A SERPL-MCNC: 52.8 UG/DL

## 2024-03-19 ENCOUNTER — APPOINTMENT (OUTPATIENT)
Dept: BARIATRICS | Facility: CLINIC | Age: 59
End: 2024-03-19
Payer: COMMERCIAL

## 2024-03-19 VITALS
DIASTOLIC BLOOD PRESSURE: 90 MMHG | OXYGEN SATURATION: 98 % | WEIGHT: 293 LBS | SYSTOLIC BLOOD PRESSURE: 130 MMHG | TEMPERATURE: 96.3 F | HEIGHT: 66.5 IN | HEART RATE: 89 BPM | BODY MASS INDEX: 46.53 KG/M2

## 2024-03-19 PROCEDURE — 99214 OFFICE O/P EST MOD 30 MIN: CPT

## 2024-03-19 RX ORDER — MINOCYCLINE HYDROCHLORIDE 50 MG/1
50 TABLET ORAL DAILY
Qty: 30 | Refills: 0 | Status: DISCONTINUED | COMMUNITY
Start: 2020-04-20 | End: 2024-03-19

## 2024-03-19 RX ORDER — CEFADROXIL 500 MG/1
500 CAPSULE ORAL
Qty: 20 | Refills: 0 | Status: DISCONTINUED | COMMUNITY
Start: 2023-10-02 | End: 2024-03-19

## 2024-03-19 RX ORDER — LOSARTAN POTASSIUM AND HYDROCHLOROTHIAZIDE 12.5; 5 MG/1; MG/1
50-12.5 TABLET ORAL
Qty: 90 | Refills: 3 | Status: DISCONTINUED | COMMUNITY
Start: 2023-09-18 | End: 2024-03-19

## 2024-03-19 RX ORDER — PHENTERMINE HYDROCHLORIDE 37.5 MG/1
37.5 TABLET ORAL
Qty: 30 | Refills: 3 | Status: DISCONTINUED | COMMUNITY
Start: 2022-11-07 | End: 2024-03-19

## 2024-03-19 RX ORDER — DICLOFENAC SODIUM 75 MG/1
75 TABLET, DELAYED RELEASE ORAL
Qty: 60 | Refills: 1 | Status: DISCONTINUED | COMMUNITY
Start: 2024-01-31 | End: 2024-03-19

## 2024-03-19 RX ORDER — FLUCONAZOLE 150 MG/1
150 TABLET ORAL
Qty: 1 | Refills: 0 | Status: DISCONTINUED | COMMUNITY
Start: 2023-10-10 | End: 2024-03-19

## 2024-03-19 NOTE — REASON FOR VISIT
[S/P Bariatric Surgery] : s/p bariatric surgery [Follow-Up Visit] : a follow-up visit for [Other___] : [unfilled]

## 2024-03-19 NOTE — ASSESSMENT
[FreeTextEntry1] : 58-year-old woman s/p Laparoscopic sleeve gastrectomy presents for follow-up. 4 lb weight loss since last visit; on Zepbound 5 mg/week; reports no side effects. Nutrition and exercise guidelines reviewed with the patient.    Continue 3 protein-focused meals/day (aim for 60 g - 70 g protein/day). We reviewed not skipping meals and eating less starch. Encourage zero calorie fluid intake (64 oz/day) Continue bariatric vitamins Exercise with cardio (aim for 8k-10k steps/day) and strength training 2-3x/week Use step counter Weigh yourself 1x-2x/week Try the Calm yasmani or Soothing Pod yasmani for stress management Follow-up with nutritionist (keep a food log) - pt refusing at this time Increase dose of Zepbound to 7.5 mg weekly Follow-up in 3 weeks All questions answered   Call with any questions or concerns   Time before and after visit spent reviewing chart    Pt seen with Dr Rivera

## 2024-03-19 NOTE — PHYSICAL EXAM
[Obese, well nourished, in no acute distress] : obese, well nourished, in no acute distress [Normal] : grossly intact [de-identified] : EOMI, no conjunctival injection, anicteric  [de-identified] : No visualized JVD or audible bruits [de-identified] : Equal chest rise, non-labored respirations, no audible wheezing  [de-identified] : soft, NT, ND, no evidence of hernia or diastasis, incisions well-healed LUQ incision 3/4 cm well healed with center punctum, minimal induration secondary to scar tissue but no erythema, swelling or drainage [de-identified] : ANA

## 2024-03-19 NOTE — HISTORY OF PRESENT ILLNESS
[Procedure: ___] : Procedure performed: [unfilled]  [Date of Surgery: ___] : Date of Surgery:   [unfilled] [Surgeon Name:   ___] : Surgeon Name: Dr. MORALEZ [Pre-Op Weight ___] : Pre-op weight was [unfilled] lbs [de-identified] : 58-year-old woman s/p Laparoscopic sleeve gastrectomy presents for follow-up. 4 lb weight loss since last visit; on Zepbound 5 mg/week; reports no side effects. Reports no abdominal pain, nausea/vomiting, constipation, diarrhea, reflux/heartburn. Patient trying to eat 3 protein-rich meals/day, trying to drink adequate zero-calorie fluids/day, taking bariatric vitamins, and exercising.   Weight at Initial Consult: 305 lbs Current Weight: 301 lbs Anti-Obesity Medication(s): Zepbound Start Date: 2/20/24 Current Dose: Zepbound 5 mg/week Side-Effects from Anti-Obesity Medication(s): None Obesity Comorbidities: HTN Comorbidities Improved/Resolved: N/A History of Bariatric Surgery: Laparoscopic sleeve gastrectomy

## 2024-03-26 ENCOUNTER — APPOINTMENT (OUTPATIENT)
Dept: INTERNAL MEDICINE | Facility: CLINIC | Age: 59
End: 2024-03-26
Payer: COMMERCIAL

## 2024-03-26 ENCOUNTER — APPOINTMENT (OUTPATIENT)
Dept: BARIATRICS | Facility: CLINIC | Age: 59
End: 2024-03-26

## 2024-03-26 PROCEDURE — 99213 OFFICE O/P EST LOW 20 MIN: CPT

## 2024-03-26 NOTE — REVIEW OF SYSTEMS
[Fever] : no fever [Chills] : no chills [Chest Pain] : no chest pain [Palpitations] : no palpitations [Lower Ext Edema] : no lower extremity edema [Shortness Of Breath] : no shortness of breath [Wheezing] : no wheezing [Cough] : no cough [Insomnia] : insomnia [Anxiety] : anxiety

## 2024-03-26 NOTE — PHYSICAL EXAM
[No Acute Distress] : no acute distress [Normal Sclera/Conjunctiva] : normal sclera/conjunctiva [Normal Outer Ear/Nose] : the outer ears and nose were normal in appearance [No JVD] : no jugular venous distention [No Respiratory Distress] : no respiratory distress  [Speech Grossly Normal] : speech grossly normal [Normal Mood] : the mood was normal

## 2024-03-26 NOTE — HISTORY OF PRESENT ILLNESS
[Home] : at home, [unfilled] , at the time of the visit. [Medical Office: ([facility])___] : at the medical office located in  [Verbal consent obtained from patient] : the patient, [unfilled] [de-identified] : Pt asks for teb for med renewal   Has been having difficulty sleeping with mounjaro

## 2024-04-09 ENCOUNTER — APPOINTMENT (OUTPATIENT)
Dept: BARIATRICS | Facility: CLINIC | Age: 59
End: 2024-04-09

## 2024-04-09 ENCOUNTER — APPOINTMENT (OUTPATIENT)
Dept: BARIATRICS | Facility: CLINIC | Age: 59
End: 2024-04-09
Payer: COMMERCIAL

## 2024-04-09 VITALS
WEIGHT: 293 LBS | HEIGHT: 66.5 IN | TEMPERATURE: 96.7 F | SYSTOLIC BLOOD PRESSURE: 130 MMHG | OXYGEN SATURATION: 99 % | DIASTOLIC BLOOD PRESSURE: 82 MMHG | BODY MASS INDEX: 46.53 KG/M2 | HEART RATE: 68 BPM

## 2024-04-09 DIAGNOSIS — E46 UNSPECIFIED PROTEIN-CALORIE MALNUTRITION: ICD-10-CM

## 2024-04-09 PROCEDURE — 99214 OFFICE O/P EST MOD 30 MIN: CPT

## 2024-04-09 PROCEDURE — 99401 PREV MED CNSL INDIV APPRX 15: CPT | Mod: 25

## 2024-04-09 RX ORDER — TIRZEPATIDE 10 MG/.5ML
10 INJECTION, SOLUTION SUBCUTANEOUS
Qty: 4 | Refills: 0 | Status: DISCONTINUED | COMMUNITY
Start: 2024-02-20 | End: 2024-04-09

## 2024-04-09 NOTE — ASSESSMENT
[FreeTextEntry1] : 58-year-old woman s/p Laparoscopic sleeve gastrectomy presents for follow-up. Weight stable; on Zepbound 10 mg/week; reports no side effects. Nutrition and exercise guidelines reviewed with the patient.    Encourage 3 protein-focused meals/day (aim for 60 g - 70 g protein/day); avoid cheese; avoid snacking especially nighttime snacking (consider drinking water instead) Increase zero calorie fluid intake (64 oz/day) Continue bariatric vitamins Increase exercise with cardio (aim for 8k-10k steps/day) and try to start strength training 2-3x/week Use step counter Weigh yourself 1x-2x/week Try the Calm yasmani or Soothing Pod yasmani for stress management Follow-up with Nutrition; pt cannot afford the nutritionist at this time; pt counseled on nutrition at today's visit by TIN Garcia.  Complete labs (ordered at today's visit) Follow-up in one month after starting Zepbound 15 mg/week; call the office right away with any side effects.  All questions answered   Call with any questions or concerns   Time before and after visit spent reviewing chart

## 2024-04-09 NOTE — HISTORY OF PRESENT ILLNESS
[de-identified] : 58-year-old woman s/p Laparoscopic Sleeve Gastrectomy presents for follow-up. Weight stable; on Zepbound 10 mg/week. Reports no abdominal pain, nausea/vomiting, constipation, diarrhea, reflux/heartburn. Patient eating 3 protein-rich meals/day (eating 20-30 g protein/day), is trying to drink adequate zero-calorie fluids/day (32 oz water/day), taking bariatric vitamins, and walking.  Weight at Initial Consult: 305 lbs Current Weight: 301 lbs Anti-Obesity Medication(s): Zepbound Start Date: 2/20/24 Current Dose: Zepbound 10 mg/week Side-Effects from Anti-Obesity Medication(s): None Obesity Comorbidities: HTN Comorbidities Improved/Resolved: N/A History of Bariatric Surgery: Laparoscopic sleeve gastrectomy.

## 2024-04-09 NOTE — PHYSICAL EXAM
[de-identified] : EOMI, no conjunctival injection, anicteric  [de-identified] : No visualized JVD or audible bruits [de-identified] : Equal chest rise, non-labored respirations, no audible wheezing  [de-identified] : soft, NT, ND, no evidence of hernia or diastasis, incisions well-healed LUQ incision 3/4 cm well healed with center punctum, minimal induration secondary to scar tissue but no erythema, swelling or drainage [de-identified] : ANA

## 2024-04-12 ENCOUNTER — APPOINTMENT (OUTPATIENT)
Dept: INTERNAL MEDICINE | Facility: CLINIC | Age: 59
End: 2024-04-12
Payer: COMMERCIAL

## 2024-04-12 PROCEDURE — 99213 OFFICE O/P EST LOW 20 MIN: CPT | Mod: 95

## 2024-04-25 LAB
VIT B1 SERPL-MCNC: 117.7 NMOL/L
ZINC SERPL-MCNC: 88 UG/DL

## 2024-04-29 ENCOUNTER — APPOINTMENT (OUTPATIENT)
Dept: INTERNAL MEDICINE | Facility: CLINIC | Age: 59
End: 2024-04-29
Payer: COMMERCIAL

## 2024-04-29 DIAGNOSIS — G62.9 POLYNEUROPATHY, UNSPECIFIED: ICD-10-CM

## 2024-04-29 PROCEDURE — 99213 OFFICE O/P EST LOW 20 MIN: CPT

## 2024-04-29 RX ORDER — PREGABALIN 100 MG/1
100 CAPSULE ORAL TWICE DAILY
Qty: 60 | Refills: 2 | Status: ACTIVE | COMMUNITY
Start: 2017-06-11 | End: 1900-01-01

## 2024-04-29 NOTE — HISTORY OF PRESENT ILLNESS
[Home] : at home, [unfilled] , at the time of the visit. [Medical Office: ([facility])___] : at the medical office located in  [Verbal consent obtained from patient] : the patient, [unfilled] [de-identified] : Pt asks for teb for renewal of lyrica  Pt feels well on meds

## 2024-05-02 ENCOUNTER — EMERGENCY (EMERGENCY)
Facility: HOSPITAL | Age: 59
LOS: 1 days | Discharge: HOME | End: 2024-05-02
Attending: EMERGENCY MEDICINE | Admitting: EMERGENCY MEDICINE
Payer: COMMERCIAL

## 2024-05-02 VITALS
SYSTOLIC BLOOD PRESSURE: 170 MMHG | OXYGEN SATURATION: 99 % | HEART RATE: 86 BPM | WEIGHT: 292.99 LBS | RESPIRATION RATE: 20 BRPM | TEMPERATURE: 98 F | HEIGHT: 66 IN | DIASTOLIC BLOOD PRESSURE: 120 MMHG

## 2024-05-02 VITALS — OXYGEN SATURATION: 99 % | HEART RATE: 82 BPM | DIASTOLIC BLOOD PRESSURE: 81 MMHG | SYSTOLIC BLOOD PRESSURE: 133 MMHG

## 2024-05-02 DIAGNOSIS — Z90.89 ACQUIRED ABSENCE OF OTHER ORGANS: Chronic | ICD-10-CM

## 2024-05-02 DIAGNOSIS — Z90.3 ACQUIRED ABSENCE OF STOMACH [PART OF]: Chronic | ICD-10-CM

## 2024-05-02 DIAGNOSIS — Z98.890 OTHER SPECIFIED POSTPROCEDURAL STATES: Chronic | ICD-10-CM

## 2024-05-02 PROCEDURE — 99285 EMERGENCY DEPT VISIT HI MDM: CPT

## 2024-05-02 PROCEDURE — 73110 X-RAY EXAM OF WRIST: CPT

## 2024-05-02 PROCEDURE — 96374 THER/PROPH/DIAG INJ IV PUSH: CPT

## 2024-05-02 PROCEDURE — 73110 X-RAY EXAM OF WRIST: CPT | Mod: 26,RT

## 2024-05-02 PROCEDURE — 99284 EMERGENCY DEPT VISIT MOD MDM: CPT | Mod: 25

## 2024-05-02 RX ORDER — OXYCODONE AND ACETAMINOPHEN 5; 325 MG/1; MG/1
1 TABLET ORAL
Qty: 12 | Refills: 0
Start: 2024-05-02 | End: 2024-05-04

## 2024-05-02 RX ORDER — ONDANSETRON 8 MG/1
4 TABLET, FILM COATED ORAL ONCE
Refills: 0 | Status: COMPLETED | OUTPATIENT
Start: 2024-05-02 | End: 2024-05-02

## 2024-05-02 RX ORDER — HYDROMORPHONE HYDROCHLORIDE 2 MG/ML
0.5 INJECTION INTRAMUSCULAR; INTRAVENOUS; SUBCUTANEOUS ONCE
Refills: 0 | Status: DISCONTINUED | OUTPATIENT
Start: 2024-05-02 | End: 2024-05-02

## 2024-05-02 RX ORDER — LIDOCAINE HCL 20 MG/ML
10 VIAL (ML) INJECTION ONCE
Refills: 0 | Status: COMPLETED | OUTPATIENT
Start: 2024-05-02 | End: 2024-05-02

## 2024-05-02 RX ADMIN — HYDROMORPHONE HYDROCHLORIDE 0.5 MILLIGRAM(S): 2 INJECTION INTRAMUSCULAR; INTRAVENOUS; SUBCUTANEOUS at 19:51

## 2024-05-02 RX ADMIN — ONDANSETRON 4 MILLIGRAM(S): 8 TABLET, FILM COATED ORAL at 17:44

## 2024-05-02 RX ADMIN — Medication 10 MILLILITER(S): at 19:52

## 2024-05-02 RX ADMIN — HYDROMORPHONE HYDROCHLORIDE 0.5 MILLIGRAM(S): 2 INJECTION INTRAMUSCULAR; INTRAVENOUS; SUBCUTANEOUS at 18:51

## 2024-05-02 NOTE — ED PROVIDER NOTE - NSFOLLOWUPINSTRUCTIONS_ED_ALL_ED_FT
Colles Fracture    A Colles fracture is a type of broken wrist. It means that the radius bone is broken or cracked near the wrist joint. The radius is one of two bones in the forearm. It is on the same side as the thumb. The other forearm bone is called the ulna. Often, when someone has a Colles fracture, the ulna is also broken.    As this injury heals, a splint or a cast is used to prevent the injured bone from moving (keep it immobilized).    What are the causes?  Common causes of this type of fracture include:  A hard, direct hit to the wrist.  Injuries, such as a car crash or falling on an outstretched hand.  What increases the risk?  The following factors may make you more likely to develop this condition:  Playing contact sports or high-risk sports, such as skiing, biking, or ice-skating.  Smoking.  Drinking more than three alcoholic beverages a day.  Having low or lowered bone density (osteoporosis or osteopenia).  Being an older adult. Young children also have a higher risk.  Being a woman who has gone through menopause.  Having a history of bone fractures.  Not getting enough (having a deficiency in) calcium or vitamin D.  What are the signs or symptoms?  Symptoms of this condition include:  Tenderness, bruising, and swelling over the fracture, which is usually near the wrist.  The wrist hanging in an odd position or looking misshapen (deformed).  Difficulty moving the wrist.  How is this diagnosed?  This condition may be diagnosed based on:  A physical exam.  Your symptoms and medical history.  An X-ray of the forearm and wrist.  How is this treated?  Treatment depends on many factors, including your age, your activity level, and how severe your fracture is. Treatment may include:  Immobilizing the wrist with a splint or a cast for several weeks. Before a splint or cast is placed on the arm, the health care provider may move the fractured bone or bones back into place (realignment).  Surgery. This may be done if the bone is completely out of place (displaced). Metal pins or other devices may be used to help hold the bone in place while it heals. After surgery, the arm is put in a splint or cast.  Physical therapy.  Pain medicine.  Follow these instructions at home:  If you have a splint:    Wear the splint as told by your health care provider. Remove it only as told by your health care provider.  Loosen the splint if your fingers tingle, become numb, or turn cold and blue.  Keep the splint clean.  If your splint is not waterproof:  Do not let it get wet.  Cover it with a watertight covering when you take a bath or a shower.  If you have a cast:    Do not stick anything inside the cast to scratch your skin. Doing that increases your risk for infection.  Check the skin around the cast every day. Tell your health care provider about any concerns.  You may put lotion on dry skin around the edges of the cast. Do not put lotion on the skin underneath the cast.  Keep the cast clean.  If the cast is not waterproof:  Do not let it get wet.  Cover it with a watertight covering when you take a bath or a shower.  Managing pain, stiffness, and swelling      If directed, put ice on the injured area. To do this:  If you have a removable splint, remove it as told by your health care provider.  Put ice in a plastic bag.  Place a towel between your skin and the bag, or between your cast and the bag.  Leave the ice on for 20 minutes, 2–3 times a day.  Remove the ice if your skin turns bright red. This is very important. If you cannot feel pain, heat, or cold, you have a greater risk of damage to the area.  Move your fingers often to reduce stiffness and swelling.  Raise (elevate) your wrist above the level of your heart while you are sitting or lying down.  Activity    Do not lift anything that is heavier than 10 lb (4.5 kg), or the limit that you are told, until your health care provider says that it is safe.  Do not use your arm to support your body weight until your health care provider says that you can.  Ask your health care provider when it is safe to drive if you have a splint or cast on your arm.  Return to your normal activities as told by your health care provider. Ask your health care provider what activities are safe for you.  If physical therapy was prescribed, do exercises as told by your health care provider.  Medicines    Take over-the-counter and prescription medicines only as told by your health care provider.  Ask your health care provider if the medicine prescribed to you:  Requires you to avoid driving or using machinery.  Can cause constipation. You may need to take these actions to prevent or treat constipation:  Drink enough fluid to keep your urine pale yellow.  Take over-the-counter or prescription medicines.  Eat foods that are high in fiber, such as beans, whole grains, and fresh fruits and vegetables.  Limit foods that are high in fat and processed sugars, such as fried or sweet foods.  General instructions    Do not put pressure on any part of the splint or cast until it is fully hardened. This may take several hours.  Do not use any products that contain nicotine or tobacco, such as cigarettes, e-cigarettes, and chewing tobacco. These can delay bone healing. If you need help quitting, ask your health care provider.  Keep all follow-up visits. This is important.  Contact a health care provider if:  Your splint or cast:  Gets wet or damaged.  Suddenly feels too tight.  You have a fever or chills.  You have pain that does not get better with medicine.  You have swelling that gets worse.  Get help right away if:  Your hand or fingers become numb or turn cold, blue, or gray.  You have tingling or numbness in your fingers, even after you loosen your splint (if this applies).  Summary  A Colles fracture is a type of broken wrist. It often involves both of the bones in the forearm (radius and ulna).  Fractures are common in people who play contact sports or high-risk sports. They are also common in older people who are at risk for osteoporosis.  This injury is diagnosed with a physical exam and X-rays.  Your wrist will need to be held in place (immobilized) with a splint or cast for several weeks. You may need surgery for a more severe fracture.  This information is not intended to replace advice given to you by your health care provider. Make sure you discuss any questions you have with your health care provider.

## 2024-05-02 NOTE — ED PROVIDER NOTE - NSICDXPASTMEDICALHX_GEN_ALL_CORE_FT
PAST MEDICAL HISTORY:  Anxiety     Cervical disc herniation unsure level    Goiter     Hypothyroid     Lumbar disc herniation L4-5, treated with epidural injections with Dr. Stephenson. Last done in 2016    Mononucleosis age 10    Morbid obesity     Neuropathy bilateral lower extremities    Osteoarthritis     Peripheral neuropathy     Sedentary lifestyle     Tendon laceration right lower leg 25 years ago    URI (upper respiratory infection) started at [facility] on 10/30/17

## 2024-05-02 NOTE — ED PROVIDER NOTE - OBJECTIVE STATEMENT
Patient complaining of right wrist pain and deformity status post trip and fall forward onto her outstretched right hand.  Patient denies head injury LOC headache dizziness nausea vomiting weakness numbness chest pain shortness of breath abdominal pain leg pain neck pain back pain or any other complaints.  Patient able to ambulate after fall.  Orthopedist Vishal

## 2024-05-02 NOTE — ED ADULT TRIAGE NOTE - BMI (KG/M2)
Symptoms have developed within the last year or so.  More now more severe especially on the right.  We will have him try wrist splints for the next 6 weeks.  If no improvement, EMGs, alternate treatments injections/surgery discussed.   47.3

## 2024-05-02 NOTE — ED PROVIDER NOTE - NSICDXPASTSURGICALHX_GEN_ALL_CORE_FT
PAST SURGICAL HISTORY:  H/O gastric sleeve     S/P laminectomy T11-12, 2010    S/P T&A (status post tonsillectomy and adenoidectomy) age 19    S/P tendon repair right lower leg 25 years ago

## 2024-05-02 NOTE — ED PROVIDER NOTE - CARE PROVIDER_API CALL
Vitaliy Guevara  Orthopaedic Surgery  [address] [place] [address]  Phone: [phone]  Fax: [phone]  Follow Up Time: 7-10 Days    Simon Rodgers  Orthopaedic Surgery  [address]  [place] [address]  Phone: [phone]  Fax: [phone]  Follow Up Time: 7-10 Days   Simon Rodgers  Orthopaedic Surgery  [address]  [place] [address]  Phone: [phone]  Fax: [phone]  Follow Up Time: 4-6 Days    Bobby Webster  Orthopaedic Surgery  [address]  [place] [address]  Phone: [phone]  Fax: [phone]  Follow Up Time: 4-6 Days

## 2024-05-02 NOTE — ED PROVIDER NOTE - PROVIDER TOKENS
PROVIDER:[TOKEN:[[id number]],FOLLOWUP:[7-10 Days]],PROVIDER:[TOKEN:[[id number]],FOLLOWUP:[7-10 Days]] PROVIDER:[TOKEN:[[id number]],FOLLOWUP:[4-6 Days]],PROVIDER:[TOKEN:[[id number]],FOLLOWUP:[4-6 Days]]

## 2024-05-02 NOTE — ED PROVIDER NOTE - NEUROLOGICAL, MLM
Addended by: AVERY ARAYA on: 8/24/2021 06:48 PM     Modules accepted: Orders    
Alert and oriented, no focal deficits, no motor or sensory deficits.

## 2024-05-02 NOTE — ED PROVIDER NOTE - CARE PROVIDERS DIRECT ADDRESSES
,[email or web address]

## 2024-05-02 NOTE — CONSULT NOTE ADULT - SUBJECTIVE AND OBJECTIVE BOX
full consult to be dictated.  R distal radius fx, displace, reduced splinted.   post reduction NVI  post reduction alignment improved, but not anatomic  rec f/u with hand specialist  sling, elevatyion, analgesics PRN

## 2024-05-02 NOTE — ED PROVIDER NOTE - SHIFT CHANGE
Child's Well Visit, 1 Week: Care Instructions  Your Care Instructions    You may wonder \"Am I doing this right? \" Trust your instincts. Cuddling, rocking, and talking to your baby are the right things to do. At this age, your new baby may respond to sounds by blinking, crying, or appearing to be startled. He or she may look at faces and follow an object with his or her eyes. Your baby may be moving his or her arms, legs, and head. Your next checkup is when your baby is 3to 2 weeks old. Follow-up care is a key part of your child's treatment and safety. Be sure to make and go to all appointments, and call your doctor if your child is having problems. It's also a good idea to know your child's test results and keep a list of the medicines your child takes. How can you care for your child at home? Feeding  · Feed your baby whenever he or she is hungry. In the first 2 weeks, your baby will breastfeed about every 1 to 3 hours. This means you may need to wake your baby to breastfeed. · If you do not breastfeed, use a formula with iron. (Talk to your doctor if you are using a low-iron formula.) At this age, most babies feed about 1½ to 3 ounces of formula every 3 to 4 hours. · Do not warm bottles in the microwave. You could burn your baby's mouth. Always check the temperature of the formula by placing a few drops on your wrist.  · Never give your baby honey in the first year of life. Honey can make your baby sick.   Breastfeeding tips  · Offer the other breast when the first breast feels empty and your baby sucks more slowly, pulls off, or loses interest. Usually your baby will continue breastfeeding, though perhaps for less time than on the first breast. If your baby takes only one breast at a feeding, start the next feeding on the other breast.  · If your baby is sleepy when it is time to eat, try changing your baby's diaper, undressing your baby and taking your shirt off for skin-to-skin contact, or gently rubbing your fingers up and down your baby's back. · If your baby cannot latch on to your breast, try this:  ¨ Hold your baby's body facing your body (chest to chest). ¨ Support your breast with your fingers under your breast and your thumb on top. Keep your fingers and thumb off of the areola. ¨ Use your nipple to lightly tickle your baby's lower lip. When your baby opens his or her mouth wide, quickly pull your baby onto your breast.  ¨ Get as much of your breast into your baby's mouth as you can. ¨ Call your doctor if you have problems. · By the third day of life, you should notice some breast fullness and milk dripping from the other breast while you nurse. · By the third day of life, your baby should be latching on to the breast well, having at least 3 stools a day, and wetting at least 6 diapers a day. Stools should be yellow and watery, not dark green and sticky. Healthy habits  · Stay healthy yourself by eating healthy foods and drinking plenty of fluids, especially water. Rest when your baby is sleeping. · Do not smoke or expose your baby to smoke. Smoking increases the risk of SIDS (crib death), ear infections, asthma, colds, and pneumonia. If you need help quitting, talk to your doctor about stop-smoking programs and medicines. These can increase your chances of quitting for good. · Wash your hands before you hold your baby. Keep your baby away from crowds and sick people. Be sure all visitors are up to date with their vaccinations. · Try to keep the umbilical cord dry until it falls off. · Keep babies younger than 6 months out of the sun. If you cannot avoid the sun, use hats and clothing to protect your child's skin. Safety  · Put your baby to sleep on his or her back, not on the side or tummy. This reduces the risk of SIDS. Use a firm, flat mattress. Do not put pillows in the crib. Do not use crib bumpers. · Put your baby in a car seat for every ride.  Place the seat in the middle of the backseat, facing backward. For questions about car seats, call the [facility] at 0-822.335.1556. Parenting  · Never shake or spank your baby. This can cause serious injury and even death. · Many women get the \"baby blues\" during the first few days after childbirth. Ask for help with preparing food and other daily tasks. Family and friends are often happy to help a new mother. · If your moodiness or anxiety lasts for more than 2 weeks, or if you feel like life is not worth living, you may have postpartum depression. Talk to your doctor. · Dress your baby with one more layer of clothing than you are wearing, including a hat during the winter. Cold air or wind does not cause ear infections or pneumonia. Illness and fever  · Hiccups, sneezing, irregular breathing, sounding congested, and crossing of the eyes are all normal.  · Call your doctor if your baby has signs of jaundice, such as yellow- or orange-colored skin. · Take your baby's rectal temperature if you think he or she is ill. It is the most accurate. Armpit and ear temperatures are not as reliable at this age. ¨ A normal rectal temperature is from 97.5°F to 100.3°F.  Madelon Shanks your baby down on his or her stomach. Put some petroleum jelly on the end of the thermometer and gently put the thermometer about ¼ to ½ inch into the rectum. Leave it in for 2 minutes. To read the thermometer, turn it so you can see the display clearly. When should you call for help? Watch closely for changes in your baby's health, and be sure to contact your doctor if:  ? · You are concerned that your baby is not getting enough to eat or is not developing normally. ? · Your baby seems sick. ? · Your baby has a fever. ? · You need more information about how to care for your baby, or you have questions or concerns. Where can you learn more? Go to http://sri-la nena.info/.   Enter M335 in the search box to learn more about \"Child's Well Visit, 1 Week: Care Instructions. \"  Current as of: May 12, 2017  Content Version: 11.4  © 4485-3729 [employer]. Care instructions adapted under license by [employer] (which disclaims liability or warranty for this information). If you have questions about a medical condition or this instruction, always ask your healthcare professional. [address] any warranty or liability for your use of this information. Feeding Your [place]: Care Instructions  Your Care Instructions    Feeding a  is an important concern for parents. Experts recommend that newborns be fed on demand. This means that you breastfeed or bottle-feed your infant whenever he or she shows signs of hunger, rather than setting a strict schedule. Newborns follow their feelings of hunger. They eat when they are hungry and stop eating when they are full. Most experts also recommend breastfeeding for at least the first year. A common concern for parents is whether their baby is eating enough. Talk to your doctor if you are concerned about how much your baby is eating. Most newborns lose weight in the first several days after birth but regain it within a week or two. After 3weeks of age, your baby should continue to gain weight steadily. Follow-up care is a key part of your child's treatment and safety. Be sure to make and go to all appointments, and call your doctor if your child is having problems. It's also a good idea to know your child's test results and keep a list of the medicines your child takes. How can you care for your child at home? · Allow your baby to feed on demand. ¨ During the first 2 weeks, these feedings occur every 1 to 3 hours (about 8 to 12 feedings in a 24-hour period) for  babies. These early feedings may last only a few minutes. Over time, feeding sessions will become longer and may happen less often.   ¨ Formula-fed babies may have slightly fewer feedings, about 6 to 10 every 24 hours. They will eat about 2 to 3 ounces every 3 to 4 hours during the first few weeks of life. ¨ By 2 months, most babies have a set feeding routine. But your baby's routine may change at times, such as during growth spurts when your baby may be hungry more often. · You may have to wake a sleepy baby to feed in the first few days after birth. · Do not give any milk other than breast milk or infant formula until your baby is 1 year of age. Cow's milk, goat's milk, and soy milk do not have the nutrients that very young babies need to grow and develop properly. Cow and goat milk are very hard for young babies to digest.  · Ask your doctor about giving a vitamin D supplement starting within the first few days after birth. · If you choose to switch your baby from the breast to bottle-feeding, try these tips. ¨ Try letting your baby drink from a bottle. Slowly reduce the number of times you breastfeed each day. For a week, replace a breastfeeding with a bottle-feeding during one of your daily feeding times. ¨ Each week, choose one more breastfeeding time to replace or shorten. ¨ Offer the bottle before each breastfeeding. When should you call for help? Watch closely for changes in your child's health, and be sure to contact your doctor if:  ? · You have questions about feeding your baby. ? · You are concerned that your baby is not eating enough. ? · You have trouble feeding your baby. Where can you learn more? Go to http://sri-la nena.info/. Enter [phone] in the search box to learn more about \"Feeding Your [place]: Care Instructions. \"  Current as of: May 12, 2017  Content Version: 11.4  © 9167-9413 [employer]. Care instructions adapted under license by [employer] (which disclaims liability or warranty for this information).  If you have questions about a medical condition or this instruction, always ask your healthcare professional. [address] any warranty or liability for your use of this information. Raising Twins or More: Care Instructions  Your Care Instructions    A pregnancy of two or more babies is called a multiple pregnancy. Caring for just one  is a big job. Raising more than one baby means even less sleep, more work, and less time for yourself. From time to time, you may feel frustrated that you cannot keep up with work at home. Do not wait for stress to become a problem before you ask for help. Your family, friends, and doctor can help you find ways to cope. Breastfeeding more than one baby can be challenging, but it helps to build the bond between you and each baby. It can give your babies better health. If you plan to breastfeed your babies, your doctor or lactation consultant can give you good advice. Some women feel sad or depressed after having twins or more. Talk to your doctor if you feel depressed or have troubling thoughts. Follow-up care is a key part of your children's treatment and safety. Be sure to make and go to all appointments, and call your doctor if any of your children is having problems. It's also a good idea to know your children's test results and keep a list of the medicines your children take. How can you care for yourself at home? · Be a good planner. Buying supplies, getting your babies in and out of cars and strollers, and keeping track of what your babies have eaten or done can become overwhelming with more than one baby. Diapers, naps, nursing, and everything else that is part of your babies' lives can take over your life and keep you from taking care of yourself, if you let it. Charts and systems to stay organized and efficient will help you to cope. · Get as much rest as you can. Do not feel guilty if you let chores go undone so that you can rest. Try to sleep when your babies are sleeping, rather than using that time to get chores done.   · Ask family and friends for help. Then let them help with the babies, the house, and your family's errands. · If you are going to breastfeed, be flexible. You may be able to breastfeed all your babies, or you may use your breast pump or formula so that your helpers can feed your babies too. A lactation consultant can help you find positions and systems to make nursing work. · Bathing your babies can be a big job and can wear you out. Whether you bathe your babies together or one at a time, ask for help. · Consider joining a support group for parents of twins or more. Sharing your experience with other people who are in a similar situation may help you with the demands of caring for your babies. See if there is a local chapter of Mothers of Twins. · Give each of your children time alone with you. If you have an older child or children, schedule regular time with each one. · Try to put aside time to be with your partner. It is easy to forget about taking time to be a couple, but life will be better if you take care of each other. · It is okay to feel negative about your life once in a while. But if you feel sad or mad often, talk to your doctor. When should you call for help? Watch closely for changes in your children's health, and be sure to contact your doctor if:  ? · You think one of your babies is not eating or growing well.   ? · You are feeling so sad or tired that you are having trouble caring for yourself or your children. Where can you learn more? Go to http://sri-la nena.info/. Enter D174 in the search box to learn more about \"Raising Twins or More: Care Instructions. \"  Current as of: May 12, 2017  Content Version: 11.4  © 3281-1356 [employer]. Care instructions adapted under license by [employer] (which disclaims liability or warranty for this information).  If you have questions about a medical condition or this instruction, always ask your healthcare professional. [address] any warranty or liability for your use of this information. Yes...

## 2024-05-02 NOTE — ED PROVIDER NOTE - PATIENT PORTAL LINK FT
You can access the FollowMyHealth Patient Portal offered by [facility] by registering at the following website: [email or web address]. By joining [employer]’s FollowMyHealth portal, you will also be able to view your health information using other applications (apps) compatible with our system.

## 2024-05-02 NOTE — ED PROVIDER NOTE - PROGRESS NOTE DETAILS
Discussed with Dr. Rodgers (attending orthopedist) he relates unavailable request call on-call Ortho for reduction.  Discussed with Dr. Guevara (attending orthopedist) he will see patient Dr. Guevara (orthopedist) at bedside for reduction wrist reduced and splinted by Dr. Guevara, advises f/u with hand specialist

## 2024-05-02 NOTE — ED ADULT NURSE NOTE - NSICDXPASTMEDICALHX_GEN_ALL_CORE_FT
PAST MEDICAL HISTORY:  Anxiety     Cervical disc herniation unsure level    Goiter     Hypothyroid     Lumbar disc herniation L4-5, treated with epidural injections with Dr. Stephneson. Last done in 2016    Mononucleosis age 10    Morbid obesity     Neuropathy bilateral lower extremities    Osteoarthritis     Peripheral neuropathy     Sedentary lifestyle     Tendon laceration right lower leg 25 years ago    URI (upper respiratory infection) started at [facility] on 10/30/17

## 2024-05-02 NOTE — ED ADULT TRIAGE NOTE - AS PAIN REST
[FreeTextEntry1] : 69 [facility].\par - records pending from [facility].\10 (severe pain)

## 2024-05-02 NOTE — ED PROVIDER NOTE - CLINICAL SUMMARY MEDICAL DECISION MAKING FREE TEXT BOX
Patient complaining of right wrist pain and deformity status post trip and fall forward onto her outstretched right hand.  Patient denies head injury LOC headache dizziness nausea vomiting weakness numbness chest pain shortness of breath abdominal pain leg pain neck pain back pain or any other complaints.  Patient able to ambulate after fall.  Orthopedist Vishal    Plan x-ray right wrist Percocet Zofran    Differential including but not limited to fracture dislocation sprain

## 2024-05-02 NOTE — ED ADULT TRIAGE NOTE - SOURCE OF INFORMATION
Quality 110: Preventive Care And Screening: Influenza Immunization: Influenza Immunization Administered during Influenza season Quality 111:Pneumonia Vaccination Status For Older Adults: Pneumococcal Vaccination Previously Received Detail Level: Detailed Patient

## 2024-05-03 ENCOUNTER — APPOINTMENT (OUTPATIENT)
Dept: ORTHOPEDIC SURGERY | Facility: CLINIC | Age: 59
End: 2024-05-03
Payer: OTHER MISCELLANEOUS

## 2024-05-03 DIAGNOSIS — S52.571A OTHER INTRAARTICULAR FRACTURE OF LOWER END OF RIGHT RADIUS, INITIAL ENCOUNTER FOR CLOSED FRACTURE: ICD-10-CM

## 2024-05-03 PROCEDURE — 99215 OFFICE O/P EST HI 40 MIN: CPT

## 2024-05-03 NOTE — ADDENDUM
[FreeTextEntry1] :  I, Rafa Singer, acted solely as a scribe for Dr. Henry on this date on 05/03/2024.

## 2024-05-03 NOTE — PHYSICAL EXAM
[de-identified] : - Constitutional: This is a female in no obvious distress. She is accompanied by her sister and brother-in-law.   - Psych: Patient is alert and oriented to person, place and time.  Patient has a normal mood and affect. - Cardiovascular: Normal pulses throughout the upper extremities.  No significant varicosities are noted in the upper extremities. - Respiratory:  Patient exhibits no evidence of shortness of breath or difficulty breathing. - Skin: No rashes, lesions, or other abnormalities are noted in the upper extremities. ---  Examination of her right wrist and hand demonstrates a sugar-tong splint.  It was tight and it was loosened.  There are no breaks in the skin.  She has complaints of numbness and tingling throughout the digits.  She has good capillary refill. [de-identified] : I reviewed x-rays of the right wrist dated 5/2/2024 which demonstrated a comminuted and dorsally displaced distal radius fracture with an associated ulnar styloid avulsion fracture.   I reviewed post reduction x-rays which demonstrated improved position of the fracture but persistent dorsal angulation.

## 2024-05-03 NOTE — END OF VISIT
[FreeTextEntry3] : This note was written by Rafa Singer on 05/03/2024 acting solely as a scribe for Dr. Scar Henry.   All medical record entries made by the Scribe were at my, Dr. Scar Hnery, direction and personally dictated by me on 05/03/2024. I have personally reviewed the chart and agree that the record accurately reflects my personal performance of the history, physical exam, assessment and plan.

## 2024-05-03 NOTE — DISCUSSION/SUMMARY
[FreeTextEntry1] : She has findings consistent with a displaced and comminuted right distal radius fracture after a fall at work yesterday.  She also has pre-existing carpal tunnel syndrome which is worsened since the fall.   I had a discussion with the patient regarding today's visit, the prognosis of this diagnosis, and treatment recommendations and options. At this time, I recommended surgical management of ORIF of her right wrist.  I have also recommended concomitant open right carpal tunnel release, given her pre-existing carpal tunnel syndrome diagnosis and her worsening symptoms.  She agreed to proceed with surgical management. I requested authorization today, as this is under Workmen's Compensation.  This is relatively emergent and I did tell her that hopefully this will be authorized by next week, so she can be put on the schedule for Thursday, May 9, which is my next available time.  -  The nature and purposes of the operation/procedure was discussed in detail.  I discussed the surgical procedure in detail, as well as the expected postoperative recovery and outcome. -  Possible risks, benefits, and complications (from known and unknown causes) of the procedure were discussed in detail.   -  Possible non-operative alternatives to the proposed treatment were discussed in detail.   -  She was told that possible risks/complications include, but are not limited to:  Infection, nerve or vessel injury, stiffness, painful scar, poor outcome, need for additional surgical procedures, and other unforeseen complications. I also discussed additional potential risks inherent in ORIF of a distal radius fracture with a volar plate.  I discussed the potential of loss of fixation, screw or plate pullout, malunion and nonunion.  The patient understands that there is a risk that the plate may need to be removed in the future, if it results in pain or other hardware-related problems. I also discussed the potential of tendon related problems secondary to volar plates, including, but not limited to, tendinitis, tendinopathy, and even tendon rupture at a late date.  We also discussed risks of carpal tunnel release. -  Finally, the possibility of an "unsuccessful outcome," despite "successful surgery," was discussed with the patient.  We discussed the potential of persistent pain, stiffness and posttraumatic arthritis.  She does have a severely comminuted fracture, and she understands the risks. -  The patient fully understands these risks and wishes to proceed.   -  I had a lengthy discussion with the patient regarding today's visit, the diagnosis, and my surgical treatment recommendations.  The patient has agreed to this plan of management and has expressed full understanding.  All questions were fully answered to the patient's satisfaction.   My cumulative time spent on this visit was approximately 60 minutes and included: Preparation for the visit, review of the medical records, review of pertinent diagnostic studies, examination and counseling of the patient on the above diagnosis, treatment plan and prognosis, orders of diagnostic tests, medication and/or appropriate procedures and documentation in the medical records of today's visit.

## 2024-05-03 NOTE — HISTORY OF PRESENT ILLNESS
[Right] : right hand dominant [FreeTextEntry1] : Workmen's Compensation case Date of accident: 5/2/2024 Working: No Degree of disability: 100% from her occupation  She comes in today for evaluation of a right wrist injury at work, which occurred 1 day ago. She rates her pain as a 10/10. She states that she had x-rays done at the ER and had her fracture reduced. She has numbness/tingling, along with swelling. She works as an assistant in a Dental office.  She is accompanied by her sister and brother-in-law.    She has a medical history of neuropathy.  She did not have diabetes.  She also has a surgical history of a hip replaced in the past.  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 [Has the patient missed work because of the injury/illness?] : The patient has missed work because of the injury/illness. [No] : The patient is currently not working.

## 2024-05-06 ENCOUNTER — APPOINTMENT (OUTPATIENT)
Dept: CARDIOLOGY | Facility: CLINIC | Age: 59
End: 2024-05-06
Payer: COMMERCIAL

## 2024-05-06 ENCOUNTER — NON-APPOINTMENT (OUTPATIENT)
Age: 59
End: 2024-05-06

## 2024-05-06 VITALS
DIASTOLIC BLOOD PRESSURE: 86 MMHG | BODY MASS INDEX: 46.53 KG/M2 | WEIGHT: 293 LBS | HEIGHT: 66.5 IN | OXYGEN SATURATION: 97 % | SYSTOLIC BLOOD PRESSURE: 110 MMHG | HEART RATE: 83 BPM

## 2024-05-06 PROCEDURE — 99214 OFFICE O/P EST MOD 30 MIN: CPT | Mod: 25

## 2024-05-06 PROCEDURE — 93000 ELECTROCARDIOGRAM COMPLETE: CPT

## 2024-05-06 RX ORDER — SEMAGLUTIDE 1 MG/.5ML
1 INJECTION, SOLUTION SUBCUTANEOUS
Qty: 1 | Refills: 0 | Status: DISCONTINUED | COMMUNITY
Start: 2024-04-16 | End: 2024-05-06

## 2024-05-06 NOTE — HISTORY OF PRESENT ILLNESS
[FreeTextEntry1] : Alisa 52yo lady with a PMH of tobacco use; here for pre-op evaluation.  Needs a right hip replacement, but cannot proceed 2/2 weight... need bariatric surgery prior to hip replacement.  Very sedentary lifestyle 2/2 severe hip pain, but denied chest pain/dyspnea/palpitations/syncope.  Compliant with meds.  +smoker ~1/2PPD.  8/9/17: s/p 2D echo and nuclear stress test -2D echo: LVEF 60% with no wall motion abnl; mild MR -nuclear stress test: normal study with breast attenuation artifact; no evidence of ischemia by EKG; LVEF 66%   10/16/17: feeling well.  Completely asymptomatic.  Denied chest pain/dyspnea/palpitations/syncope.  2D echo and nuclear stress test normal as above.   9/8/2020:  s/p hip replacement.  Since that time, unable to exercise 2/2 knee osteoarthitis.  Gained >50lbs.  Awaiting bariatric surgery.  Very SOB with minimal exertion.  6/15/23: newly dx HTN with heacaches started on amlodipine and telmisartan SBPs improved but not at goal... DBPs 90s and systolics up to 150-160s intermittently  5/6/24: feeling well from a cardiac standpoint SBPs at goal fell and broke her wrist; awaiting surgery Thursday EKG: sinus 83bpm; RBBB; no ischemic changes

## 2024-05-06 NOTE — DISCUSSION/SUMMARY
[FreeTextEntry1] : 1)Cardiac:  2D echo and nuclear stress test normal 2)HTN: BP much improved on current meds -cont low-salt diet -cont losartan/HCTZ 50-12.5 3)Wrist surgery: at low cardiovascular risk for a low risk procedure No cardiac contraindication to proceeding [EKG obtained to assist in diagnosis and management of assessed problem(s)] : EKG obtained to assist in diagnosis and management of assessed problem(s)

## 2024-05-07 ENCOUNTER — OUTPATIENT (OUTPATIENT)
Dept: OUTPATIENT SERVICES | Facility: HOSPITAL | Age: 59
LOS: 1 days | End: 2024-05-07
Payer: COMMERCIAL

## 2024-05-07 VITALS
TEMPERATURE: 98 F | DIASTOLIC BLOOD PRESSURE: 80 MMHG | SYSTOLIC BLOOD PRESSURE: 124 MMHG | WEIGHT: 293 LBS | HEART RATE: 82 BPM | OXYGEN SATURATION: 98 % | RESPIRATION RATE: 18 BRPM | HEIGHT: 64 IN

## 2024-05-07 DIAGNOSIS — Z96.641 PRESENCE OF RIGHT ARTIFICIAL HIP JOINT: Chronic | ICD-10-CM

## 2024-05-07 DIAGNOSIS — Z98.890 OTHER SPECIFIED POSTPROCEDURAL STATES: Chronic | ICD-10-CM

## 2024-05-07 DIAGNOSIS — Z90.3 ACQUIRED ABSENCE OF STOMACH [PART OF]: Chronic | ICD-10-CM

## 2024-05-07 DIAGNOSIS — S52.501A UNSPECIFIED FRACTURE OF THE LOWER END OF RIGHT RADIUS, INITIAL ENCOUNTER FOR CLOSED FRACTURE: ICD-10-CM

## 2024-05-07 DIAGNOSIS — Z01.818 ENCOUNTER FOR OTHER PREPROCEDURAL EXAMINATION: ICD-10-CM

## 2024-05-07 DIAGNOSIS — S52.571A OTHER INTRAARTICULAR FRACTURE OF LOWER END OF RIGHT RADIUS, INITIAL ENCOUNTER FOR CLOSED FRACTURE: ICD-10-CM

## 2024-05-07 DIAGNOSIS — G56.01 CARPAL TUNNEL SYNDROME, RIGHT UPPER LIMB: ICD-10-CM

## 2024-05-07 DIAGNOSIS — Z90.89 ACQUIRED ABSENCE OF OTHER ORGANS: Chronic | ICD-10-CM

## 2024-05-07 LAB
ALBUMIN SERPL ELPH-MCNC: 4.1 G/DL — SIGNIFICANT CHANGE UP (ref 3.3–5)
ALP SERPL-CCNC: 96 U/L — SIGNIFICANT CHANGE UP (ref 30–120)
ALT FLD-CCNC: 32 U/L — SIGNIFICANT CHANGE UP (ref 10–60)
ANION GAP SERPL CALC-SCNC: 8 MMOL/L — SIGNIFICANT CHANGE UP (ref 5–17)
AST SERPL-CCNC: 23 U/L — SIGNIFICANT CHANGE UP (ref 10–40)
BILIRUB SERPL-MCNC: 0.7 MG/DL — SIGNIFICANT CHANGE UP (ref 0.2–1.2)
BUN SERPL-MCNC: 19 MG/DL — SIGNIFICANT CHANGE UP (ref 7–23)
CALCIUM SERPL-MCNC: 9 MG/DL — SIGNIFICANT CHANGE UP (ref 8.4–10.5)
CHLORIDE SERPL-SCNC: 95 MMOL/L — LOW (ref 96–108)
CO2 SERPL-SCNC: 32 MMOL/L — HIGH (ref 22–31)
CREAT SERPL-MCNC: 0.99 MG/DL — SIGNIFICANT CHANGE UP (ref 0.5–1.3)
EGFR: 65 ML/MIN/1.73M2 — SIGNIFICANT CHANGE UP
GLUCOSE SERPL-MCNC: 113 MG/DL — HIGH (ref 70–99)
HCT VFR BLD CALC: 40.5 % — SIGNIFICANT CHANGE UP (ref 34.5–45)
HGB BLD-MCNC: 13.6 G/DL — SIGNIFICANT CHANGE UP (ref 11.5–15.5)
MCHC RBC-ENTMCNC: 30.3 PG — SIGNIFICANT CHANGE UP (ref 27–34)
MCHC RBC-ENTMCNC: 33.6 GM/DL — SIGNIFICANT CHANGE UP (ref 32–36)
MCV RBC AUTO: 90.2 FL — SIGNIFICANT CHANGE UP (ref 80–100)
NRBC # BLD: 0 /100 WBCS — SIGNIFICANT CHANGE UP (ref 0–0)
PLATELET # BLD AUTO: 331 K/UL — SIGNIFICANT CHANGE UP (ref 150–400)
POTASSIUM SERPL-MCNC: 3.1 MMOL/L — LOW (ref 3.5–5.3)
POTASSIUM SERPL-SCNC: 3.1 MMOL/L — LOW (ref 3.5–5.3)
PROT SERPL-MCNC: 7.5 G/DL — SIGNIFICANT CHANGE UP (ref 6–8.3)
RBC # BLD: 4.49 M/UL — SIGNIFICANT CHANGE UP (ref 3.8–5.2)
RBC # FLD: 12.9 % — SIGNIFICANT CHANGE UP (ref 10.3–14.5)
SODIUM SERPL-SCNC: 135 MMOL/L — SIGNIFICANT CHANGE UP (ref 135–145)
WBC # BLD: 10.91 K/UL — HIGH (ref 3.8–10.5)
WBC # FLD AUTO: 10.91 K/UL — HIGH (ref 3.8–10.5)

## 2024-05-07 PROCEDURE — G0463: CPT

## 2024-05-07 PROCEDURE — 85027 COMPLETE CBC AUTOMATED: CPT

## 2024-05-07 PROCEDURE — 36415 COLL VENOUS BLD VENIPUNCTURE: CPT

## 2024-05-07 PROCEDURE — 80053 COMPREHEN METABOLIC PANEL: CPT

## 2024-05-07 RX ORDER — ZOLPIDEM TARTRATE 10 MG/1
1 TABLET ORAL
Refills: 0 | DISCHARGE

## 2024-05-07 RX ORDER — MULTIVIT-MIN/FERROUS GLUCONATE 9 MG/15 ML
2 LIQUID (ML) ORAL
Qty: 0 | Refills: 0 | DISCHARGE

## 2024-05-07 RX ORDER — ZOLPIDEM TARTRATE 10 MG/1
1 TABLET ORAL
Qty: 0 | Refills: 0 | DISCHARGE

## 2024-05-07 RX ORDER — LEVOTHYROXINE SODIUM 125 MCG
1 TABLET ORAL
Qty: 0 | Refills: 0 | DISCHARGE

## 2024-05-07 RX ORDER — LEVOTHYROXINE SODIUM 125 MCG
1 TABLET ORAL
Refills: 0 | DISCHARGE

## 2024-05-07 RX ORDER — AMLODIPINE BESYLATE 2.5 MG/1
1 TABLET ORAL
Refills: 0 | DISCHARGE

## 2024-05-07 RX ORDER — NORTRIPTYLINE HYDROCHLORIDE 10 MG/1
1 CAPSULE ORAL
Qty: 0 | Refills: 0 | DISCHARGE

## 2024-05-07 RX ORDER — LOSARTAN POTASSIUM 100 MG/1
1 TABLET, FILM COATED ORAL
Refills: 0 | DISCHARGE

## 2024-05-07 RX ORDER — AZITHROMYCIN 500 MG/1
0 TABLET, FILM COATED ORAL
Qty: 0 | Refills: 0 | DISCHARGE

## 2024-05-07 RX ORDER — ALPRAZOLAM 0.25 MG
1 TABLET ORAL
Qty: 0 | Refills: 0 | DISCHARGE

## 2024-05-07 RX ORDER — TELMISARTAN 20 MG/1
1 TABLET ORAL
Refills: 0 | DISCHARGE

## 2024-05-07 RX ORDER — FLUTICASONE PROPIONATE AND SALMETEROL 50; 250 UG/1; UG/1
2 POWDER ORAL; RESPIRATORY (INHALATION)
Qty: 0 | Refills: 0 | DISCHARGE

## 2024-05-07 RX ORDER — IBUPROFEN 200 MG
1 TABLET ORAL
Qty: 0 | Refills: 0 | DISCHARGE

## 2024-05-07 RX ORDER — NORTRIPTYLINE HYDROCHLORIDE 10 MG/1
1 CAPSULE ORAL
Refills: 0 | DISCHARGE

## 2024-05-07 RX ORDER — DICLOFENAC SODIUM 75 MG/1
0 TABLET, DELAYED RELEASE ORAL
Qty: 0 | Refills: 0 | DISCHARGE

## 2024-05-07 NOTE — H&P PST ADULT - NSICDXPASTMEDICALHX_GEN_ALL_CORE_FT
PAST MEDICAL HISTORY:  Anxiety     Cervical disc herniation unsure level    Closed fracture of radius     Goiter     Hypertension     Hypothyroid     Lumbar disc herniation L4-5, treated with epidural injections with Dr. Stephenson. Last done in 2016    Mononucleosis age 10    Morbid obesity     Neuropathy bilateral lower extremities    Osteoarthritis     Peripheral neuropathy     Sedentary lifestyle     Tendon laceration right lower leg 25 years ago    URI (upper respiratory infection) started at [facility] on 10/30/17

## 2024-05-07 NOTE — H&P PST ADULT - PROBLEM SELECTOR PLAN 1
57 y/o female with right radius fracture  scheduled surgery: ORIF of distal radius fracture and right open carpal tunnel release  will obtain medical, cardiology clearance  pre-op instructions provided  Instructions provided on medications to continue and to take the day morning of surgery

## 2024-05-07 NOTE — H&P PST ADULT - NSICDXPASTSURGICALHX_GEN_ALL_CORE_FT
PAST SURGICAL HISTORY:  H/O gastric sleeve     S/P hip replacement, right     S/P laminectomy T11-12, 2010    S/P T&A (status post tonsillectomy and adenoidectomy) age 19    S/P tendon repair right lower leg 25 years ago

## 2024-05-07 NOTE — H&P PST ADULT - HISTORY OF PRESENT ILLNESS
57 y/o female present with right radius fracture. Patient states that she fell at work on 05/02. She complains 10/10 pain. She has a hard cast in place. Patient is scheduled for  ORIF of displaced INTR  Articular right radius fracture and right carpal tunnel release on 05/09/2024 57 y/o female present with right radius fracture. Patient states that she fell at work on 05/02. She complains 10/10 pain. She takes percocet for pain with some relief. She has a hard cast in place. Patient is scheduled for  ORIF of displaced INTR  Articular right radius fracture and right carpal tunnel release on 05/09/2024

## 2024-05-07 NOTE — H&P PST ADULT - NSICDXPROCEDURE_GEN_ALL_CORE_FT
PROCEDURES:  Open reduction and internal fixation (ORIF) of fracture of distal radius with carpal tunnel release 07-May-2024 13:59:47  Jil Pederson

## 2024-05-08 ENCOUNTER — TRANSCRIPTION ENCOUNTER (OUTPATIENT)
Age: 59
End: 2024-05-08

## 2024-05-08 ENCOUNTER — APPOINTMENT (OUTPATIENT)
Dept: INTERNAL MEDICINE | Facility: CLINIC | Age: 59
End: 2024-05-08
Payer: COMMERCIAL

## 2024-05-08 ENCOUNTER — NON-APPOINTMENT (OUTPATIENT)
Age: 59
End: 2024-05-08

## 2024-05-08 VITALS
HEIGHT: 66.5 IN | OXYGEN SATURATION: 98 % | HEART RATE: 83 BPM | BODY MASS INDEX: 46.53 KG/M2 | TEMPERATURE: 98.8 F | WEIGHT: 293 LBS

## 2024-05-08 VITALS — SYSTOLIC BLOOD PRESSURE: 132 MMHG | DIASTOLIC BLOOD PRESSURE: 84 MMHG

## 2024-05-08 DIAGNOSIS — I10 ESSENTIAL (PRIMARY) HYPERTENSION: ICD-10-CM

## 2024-05-08 DIAGNOSIS — I45.10 UNSPECIFIED RIGHT BUNDLE-BRANCH BLOCK: ICD-10-CM

## 2024-05-08 DIAGNOSIS — Z01.818 ENCOUNTER FOR OTHER PREPROCEDURAL EXAMINATION: ICD-10-CM

## 2024-05-08 DIAGNOSIS — E87.6 HYPOKALEMIA: ICD-10-CM

## 2024-05-08 DIAGNOSIS — F17.210 NICOTINE DEPENDENCE, CIGARETTES, UNCOMPLICATED: ICD-10-CM

## 2024-05-08 PROCEDURE — 99214 OFFICE O/P EST MOD 30 MIN: CPT

## 2024-05-08 NOTE — ASSESSMENT
[Patient Optimized for Surgery] : Patient optimized for surgery [No Further Testing Recommended] : no further testing recommended [As per surgery] : as per surgery [FreeTextEntry4] : K 3.1 will give 20mEq KCl x2 no need to repeat EKG chronic incomplete RBBB no contraindication to surgery  patient medically cleared for proposed procedure

## 2024-05-08 NOTE — PHYSICAL EXAM
[No Acute Distress] : no acute distress [Well Nourished] : well nourished [Well Developed] : well developed [Well-Appearing] : well-appearing [Normal Sclera/Conjunctiva] : normal sclera/conjunctiva [PERRL] : pupils equal round and reactive to light [EOMI] : extraocular movements intact [Normal Outer Ear/Nose] : the outer ears and nose were normal in appearance [Normal Oropharynx] : the oropharynx was normal [No JVD] : no jugular venous distention [No Lymphadenopathy] : no lymphadenopathy [Supple] : supple [Thyroid Normal, No Nodules] : the thyroid was normal and there were no nodules present [No Respiratory Distress] : no respiratory distress  [No Accessory Muscle Use] : no accessory muscle use [Clear to Auscultation] : lungs were clear to auscultation bilaterally [Normal Rate] : normal rate  [Regular Rhythm] : with a regular rhythm [Normal S1, S2] : normal S1 and S2 [No Murmur] : no murmur heard [No Carotid Bruits] : no carotid bruits [No Abdominal Bruit] : a ~M bruit was not heard ~T in the abdomen [No Varicosities] : no varicosities [Pedal Pulses Present] : the pedal pulses are present [No Edema] : there was no peripheral edema [No Palpable Aorta] : no palpable aorta [No Extremity Clubbing/Cyanosis] : no extremity clubbing/cyanosis [Soft] : abdomen soft [Non Tender] : non-tender [Non-distended] : non-distended [No Masses] : no abdominal mass palpated [No HSM] : no HSM [Normal Bowel Sounds] : normal bowel sounds [Normal Posterior Cervical Nodes] : no posterior cervical lymphadenopathy [Normal Anterior Cervical Nodes] : no anterior cervical lymphadenopathy [No CVA Tenderness] : no CVA  tenderness [No Spinal Tenderness] : no spinal tenderness [No Joint Swelling] : no joint swelling [Grossly Normal Strength/Tone] : grossly normal strength/tone [No Rash] : no rash [Coordination Grossly Intact] : coordination grossly intact [No Focal Deficits] : no focal deficits [Normal Gait] : normal gait [Deep Tendon Reflexes (DTR)] : deep tendon reflexes were 2+ and symmetric [Normal Affect] : the affect was normal [Normal Insight/Judgement] : insight and judgment were intact [de-identified] : obese [de-identified] : right wrist cast

## 2024-05-08 NOTE — HISTORY OF PRESENT ILLNESS
[No Pertinent Cardiac History] : no history of aortic stenosis, atrial fibrillation, coronary artery disease, recent myocardial infarction, or implantable device/pacemaker [Smoker] : smoker [No Adverse Anesthesia Reaction] : no adverse anesthesia reaction in self or family member [(Patient denies any chest pain, claudication, dyspnea on exertion, orthopnea, palpitations or syncope)] : Patient denies any chest pain, claudication, dyspnea on exertion, orthopnea, palpitations or syncope [Moderate (4-6 METs)] : Moderate (4-6 METs) [FreeTextEntry1] : right wrist surgery [FreeTextEntry2] : 5/9/24 [FreeTextEntry3] : Dr. Henry  [FreeTextEntry4] : PLEASE SEE FORM NOT THIS NOTE [FreeTextEntry7] : EKG NSR, incomplete RBBB which is chronic

## 2024-05-09 ENCOUNTER — APPOINTMENT (OUTPATIENT)
Dept: ORTHOPEDIC SURGERY | Facility: HOSPITAL | Age: 59
End: 2024-05-09

## 2024-05-09 ENCOUNTER — TRANSCRIPTION ENCOUNTER (OUTPATIENT)
Age: 59
End: 2024-05-09

## 2024-05-09 ENCOUNTER — OUTPATIENT (OUTPATIENT)
Dept: OUTPATIENT SERVICES | Facility: HOSPITAL | Age: 59
LOS: 1 days | End: 2024-05-09
Payer: COMMERCIAL

## 2024-05-09 VITALS
DIASTOLIC BLOOD PRESSURE: 68 MMHG | SYSTOLIC BLOOD PRESSURE: 135 MMHG | HEART RATE: 80 BPM | RESPIRATION RATE: 15 BRPM | TEMPERATURE: 98 F | OXYGEN SATURATION: 98 % | WEIGHT: 293 LBS | HEIGHT: 66 IN

## 2024-05-09 VITALS
HEART RATE: 88 BPM | SYSTOLIC BLOOD PRESSURE: 112 MMHG | OXYGEN SATURATION: 98 % | DIASTOLIC BLOOD PRESSURE: 58 MMHG | RESPIRATION RATE: 16 BRPM

## 2024-05-09 DIAGNOSIS — Z90.89 ACQUIRED ABSENCE OF OTHER ORGANS: Chronic | ICD-10-CM

## 2024-05-09 DIAGNOSIS — Z90.3 ACQUIRED ABSENCE OF STOMACH [PART OF]: Chronic | ICD-10-CM

## 2024-05-09 DIAGNOSIS — Z98.890 OTHER SPECIFIED POSTPROCEDURAL STATES: Chronic | ICD-10-CM

## 2024-05-09 DIAGNOSIS — G56.01 CARPAL TUNNEL SYNDROME, RIGHT UPPER LIMB: ICD-10-CM

## 2024-05-09 DIAGNOSIS — S52.571A OTHER INTRAARTICULAR FRACTURE OF LOWER END OF RIGHT RADIUS, INITIAL ENCOUNTER FOR CLOSED FRACTURE: ICD-10-CM

## 2024-05-09 DIAGNOSIS — Z96.641 PRESENCE OF RIGHT ARTIFICIAL HIP JOINT: Chronic | ICD-10-CM

## 2024-05-09 DIAGNOSIS — Z01.818 ENCOUNTER FOR OTHER PREPROCEDURAL EXAMINATION: ICD-10-CM

## 2024-05-09 PROBLEM — I10 ESSENTIAL (PRIMARY) HYPERTENSION: Chronic | Status: ACTIVE | Noted: 2024-05-07

## 2024-05-09 PROBLEM — S52.90XA UNSPECIFIED FRACTURE OF UNSPECIFIED FOREARM, INITIAL ENCOUNTER FOR CLOSED FRACTURE: Chronic | Status: ACTIVE | Noted: 2024-05-07

## 2024-05-09 LAB
POTASSIUM SERPL-MCNC: 3.8 MMOL/L — SIGNIFICANT CHANGE UP (ref 3.5–5.3)
POTASSIUM SERPL-SCNC: 3.8 MMOL/L — SIGNIFICANT CHANGE UP (ref 3.5–5.3)

## 2024-05-09 PROCEDURE — 25609 OPTX DST RD XART FX/EP SEP3+: CPT | Mod: AS,RT

## 2024-05-09 PROCEDURE — C9399: CPT

## 2024-05-09 PROCEDURE — 76000 FLUOROSCOPY <1 HR PHYS/QHP: CPT

## 2024-05-09 PROCEDURE — 64721 CARPAL TUNNEL SURGERY: CPT | Mod: RT

## 2024-05-09 PROCEDURE — 25609 OPTX DST RD XART FX/EP SEP3+: CPT | Mod: RT

## 2024-05-09 PROCEDURE — 84132 ASSAY OF SERUM POTASSIUM: CPT

## 2024-05-09 PROCEDURE — C1713: CPT

## 2024-05-09 PROCEDURE — 36415 COLL VENOUS BLD VENIPUNCTURE: CPT

## 2024-05-09 DEVICE — SCREW CORT 2.5X13MM: Type: IMPLANTABLE DEVICE | Site: RIGHT | Status: FUNCTIONAL

## 2024-05-09 DEVICE — IMPLANTABLE DEVICE: Type: IMPLANTABLE DEVICE | Site: RIGHT | Status: FUNCTIONAL

## 2024-05-09 DEVICE — K-WIRE MEDARTIS (SMOOTH) SINGLE TROCAR 1.6MM X 150MM: Type: IMPLANTABLE DEVICE | Site: RIGHT | Status: FUNCTIONAL

## 2024-05-09 DEVICE — SCREW CORT 2.5X12MM: Type: IMPLANTABLE DEVICE | Site: RIGHT | Status: FUNCTIONAL

## 2024-05-09 DEVICE — SCREW CORT 2.5X14MM: Type: IMPLANTABLE DEVICE | Site: RIGHT | Status: FUNCTIONAL

## 2024-05-09 DEVICE — SCREW TRILOCK 2.5X22MM: Type: IMPLANTABLE DEVICE | Site: RIGHT | Status: FUNCTIONAL

## 2024-05-09 DEVICE — SCREW TRILOCK 2.5X18MM: Type: IMPLANTABLE DEVICE | Site: RIGHT | Status: FUNCTIONAL

## 2024-05-09 DEVICE — SCREW TRILOCK 2.5X20MM: Type: IMPLANTABLE DEVICE | Site: RIGHT | Status: FUNCTIONAL

## 2024-05-09 RX ORDER — CELECOXIB 200 MG/1
1 CAPSULE ORAL
Qty: 10 | Refills: 0
Start: 2024-05-09

## 2024-05-09 RX ORDER — ACETAMINOPHEN 500 MG
1000 TABLET ORAL ONCE
Refills: 0 | Status: COMPLETED | OUTPATIENT
Start: 2024-05-09 | End: 2024-05-09

## 2024-05-09 RX ORDER — HYDROMORPHONE HYDROCHLORIDE 2 MG/ML
0.5 INJECTION INTRAMUSCULAR; INTRAVENOUS; SUBCUTANEOUS
Refills: 0 | Status: DISCONTINUED | OUTPATIENT
Start: 2024-05-09 | End: 2024-05-09

## 2024-05-09 RX ORDER — CEPHALEXIN 500 MG
1 CAPSULE ORAL
Qty: 8 | Refills: 0
Start: 2024-05-09 | End: 2024-05-10

## 2024-05-09 RX ORDER — DICLOFENAC SODIUM 75 MG/1
1 TABLET, DELAYED RELEASE ORAL
Refills: 0 | DISCHARGE

## 2024-05-09 RX ORDER — CHLORHEXIDINE GLUCONATE 213 G/1000ML
1 SOLUTION TOPICAL ONCE
Refills: 0 | Status: COMPLETED | OUTPATIENT
Start: 2024-05-09 | End: 2024-05-09

## 2024-05-09 RX ORDER — SODIUM CHLORIDE 9 MG/ML
1000 INJECTION, SOLUTION INTRAVENOUS
Refills: 0 | Status: DISCONTINUED | OUTPATIENT
Start: 2024-05-09 | End: 2024-05-23

## 2024-05-09 RX ORDER — APREPITANT 80 MG/1
40 CAPSULE ORAL ONCE
Refills: 0 | Status: COMPLETED | OUTPATIENT
Start: 2024-05-09 | End: 2024-05-09

## 2024-05-09 RX ORDER — OXYCODONE HYDROCHLORIDE 5 MG/1
5 TABLET ORAL ONCE
Refills: 0 | Status: DISCONTINUED | OUTPATIENT
Start: 2024-05-09 | End: 2024-05-09

## 2024-05-09 RX ORDER — ONDANSETRON 8 MG/1
4 TABLET, FILM COATED ORAL ONCE
Refills: 0 | Status: DISCONTINUED | OUTPATIENT
Start: 2024-05-09 | End: 2024-05-23

## 2024-05-09 RX ORDER — HYDROMORPHONE HYDROCHLORIDE 2 MG/ML
1 INJECTION INTRAMUSCULAR; INTRAVENOUS; SUBCUTANEOUS
Refills: 0 | Status: DISCONTINUED | OUTPATIENT
Start: 2024-05-09 | End: 2024-05-09

## 2024-05-09 RX ORDER — CEFAZOLIN SODIUM 1 G
3000 VIAL (EA) INJECTION ONCE
Refills: 0 | Status: COMPLETED | OUTPATIENT
Start: 2024-05-09 | End: 2024-05-09

## 2024-05-09 RX ORDER — OXYCODONE AND ACETAMINOPHEN 5; 325 MG/1; MG/1
1 TABLET ORAL
Qty: 20 | Refills: 0
Start: 2024-05-09

## 2024-05-09 RX ADMIN — APREPITANT 40 MILLIGRAM(S): 80 CAPSULE ORAL at 10:07

## 2024-05-09 RX ADMIN — SODIUM CHLORIDE 75 MILLILITER(S): 9 INJECTION, SOLUTION INTRAVENOUS at 15:10

## 2024-05-09 NOTE — ASU PATIENT PROFILE, ADULT - FALL HARM RISK - RISK INTERVENTIONS

## 2024-05-09 NOTE — ASU DISCHARGE PLAN (ADULT/PEDIATRIC) - CARE PROVIDER_API CALL
Scar Henry)  Surgery of the Hand  [address], [address]  [place] [address]  Phone: [phone]  Fax: [phone]  Scheduled Appointment: 05/16/2024

## 2024-05-09 NOTE — ASU DISCHARGE PLAN (ADULT/PEDIATRIC) - NS MD DC FALL RISK RISK
For information on Fall & Injury Prevention, visit: [email or web address] OR  [email or web address] OR  https://www.cdc.gov/steadi/patient.html

## 2024-05-09 NOTE — BRIEF OPERATIVE NOTE - NSICDXBRIEFPROCEDURE_GEN_ALL_CORE_FT
PROCEDURES:  Open reduction and internal fixation of distal radius and ulna 09-May-2024 14:37:18 right Silvia Bah

## 2024-05-10 RX ORDER — LOSARTAN POTASSIUM 50 MG/1
50 TABLET, FILM COATED ORAL
Qty: 90 | Refills: 3 | Status: ACTIVE | COMMUNITY
Start: 2024-01-08 | End: 1900-01-01

## 2024-05-16 ENCOUNTER — APPOINTMENT (OUTPATIENT)
Dept: ORTHOPEDIC SURGERY | Facility: CLINIC | Age: 59
End: 2024-05-16
Payer: OTHER MISCELLANEOUS

## 2024-05-16 PROCEDURE — 73110 X-RAY EXAM OF WRIST: CPT | Mod: RT

## 2024-05-16 PROCEDURE — 29075 APPL CST ELBW FNGR SHORT ARM: CPT | Mod: 58,RT

## 2024-05-16 PROCEDURE — 99024 POSTOP FOLLOW-UP VISIT: CPT

## 2024-05-16 PROCEDURE — A4590: CPT | Mod: RT

## 2024-05-16 NOTE — PHYSICAL EXAM
[de-identified] : Examination of her right wrist and hand after the splint and dressing were removed demonstrates her incisions to be clean and dry.  There is no drainage or evidence of infection.  There is swelling as expected.  She has improved flexion of the digits into the palm.  She has intact sensation to light touch distally along the radial, ulnar and median nerve distributions. [de-identified] : PA, lateral, oblique and 20 degree to lateral views of her right wrist demonstrate her distal radius fracture and hardware to be in good alignment.  One of the screw heads is slightly prominent distally.

## 2024-05-16 NOTE — ADDENDUM
[FreeTextEntry1] : I, Raymon Weinberg, acted solely as a scribe for Dr. Henry on this date on 5/16/24.

## 2024-05-16 NOTE — DISCUSSION/SUMMARY
[FreeTextEntry1] : The suture ends were cut and Steri-Strips were applied.  She was placed into a cast and she was instructed on elevation and range of motion exercises to the digits.  She will follow-up in 18 days for x-rays out of plaster  Finally, I did discuss with her that one of the screw has a slightly prominent on the lateral.  As long as it remains in this position, it should not cause any problems.  However, I did tell her that if the screw does backed out with time, after the fracture is healed, it may need to be removed.

## 2024-05-16 NOTE — END OF VISIT
[FreeTextEntry3] : This note was written by Raymon Weinberg on 5/16/24 acting solely as a scribe for Dr. Scar Henry.   All medical record entries made by the Scribe were at my, Dr. Scar Henry, direction and personally dictated by me on 5/16/24. I have personally reviewed the chart and agree that the record accurately reflects my personal performance of the history, physical exam, assessment and plan.

## 2024-05-16 NOTE — PROCEDURE
[FreeTextEntry1] :  She was placed into a well-padded and well-molded right short arm fiberglass cast. She was instructed on cast care, elevation and range of motion exercises to the digits. She will follow-up in 18 days for x-rays out of plaster.

## 2024-05-16 NOTE — HISTORY OF PRESENT ILLNESS
[FreeTextEntry1] : Workmen's Compensation case Date of accident: 5/2/2024 Working: No Degree of disability: 100% from her occupation  1 week status post open reduction and internal fixation of displaced right distal radius fracture and right open carpal tunnel release.  Date of surgery: 5/9/2024.  She is doing well. Her wrist is , but she feels some improvement in her carpal tunnel symptoms. 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

## 2024-05-19 ENCOUNTER — NON-APPOINTMENT (OUTPATIENT)
Age: 59
End: 2024-05-19

## 2024-05-23 ENCOUNTER — NON-APPOINTMENT (OUTPATIENT)
Age: 59
End: 2024-05-23

## 2024-05-31 RX ORDER — AMLODIPINE BESYLATE 5 MG/1
5 TABLET ORAL
Qty: 90 | Refills: 0 | Status: ACTIVE | COMMUNITY
Start: 2023-05-05 | End: 1900-01-01

## 2024-06-01 RX ORDER — POLYETHYLENE GLYCOL 3350 17 G/17G
17 POWDER, FOR SOLUTION ORAL
Qty: 5 | Refills: 0 | Status: DISCONTINUED | COMMUNITY
Start: 2023-02-07 | End: 2024-06-01

## 2024-06-03 ENCOUNTER — APPOINTMENT (OUTPATIENT)
Dept: ORTHOPEDIC SURGERY | Facility: CLINIC | Age: 59
End: 2024-06-03
Payer: OTHER MISCELLANEOUS

## 2024-06-03 ENCOUNTER — APPOINTMENT (OUTPATIENT)
Dept: INTERNAL MEDICINE | Facility: CLINIC | Age: 59
End: 2024-06-03
Payer: COMMERCIAL

## 2024-06-03 VITALS
HEIGHT: 66 IN | HEART RATE: 87 BPM | BODY MASS INDEX: 46.28 KG/M2 | OXYGEN SATURATION: 97 % | WEIGHT: 288 LBS | TEMPERATURE: 98.2 F

## 2024-06-03 VITALS — DIASTOLIC BLOOD PRESSURE: 80 MMHG | SYSTOLIC BLOOD PRESSURE: 132 MMHG

## 2024-06-03 DIAGNOSIS — F41.9 ANXIETY DISORDER, UNSPECIFIED: ICD-10-CM

## 2024-06-03 DIAGNOSIS — E03.9 HYPOTHYROIDISM, UNSPECIFIED: ICD-10-CM

## 2024-06-03 DIAGNOSIS — G47.00 INSOMNIA, UNSPECIFIED: ICD-10-CM

## 2024-06-03 PROCEDURE — G2211 COMPLEX E/M VISIT ADD ON: CPT

## 2024-06-03 PROCEDURE — 29125 APPL SHORT ARM SPLINT STATIC: CPT | Mod: 58,RT

## 2024-06-03 PROCEDURE — 73110 X-RAY EXAM OF WRIST: CPT | Mod: RT

## 2024-06-03 PROCEDURE — 99024 POSTOP FOLLOW-UP VISIT: CPT

## 2024-06-03 PROCEDURE — 99213 OFFICE O/P EST LOW 20 MIN: CPT

## 2024-06-03 RX ORDER — NORTRIPTYLINE HYDROCHLORIDE 10 MG/1
10 CAPSULE ORAL
Qty: 180 | Refills: 2 | Status: ACTIVE | COMMUNITY
Start: 2017-06-11 | End: 1900-01-01

## 2024-06-03 RX ORDER — ZOLPIDEM TARTRATE 10 MG/1
10 TABLET ORAL
Qty: 30 | Refills: 2 | Status: ACTIVE | COMMUNITY
Start: 2017-06-02 | End: 1900-01-01

## 2024-06-03 RX ORDER — DICLOFENAC SODIUM 75 MG/1
75 TABLET, DELAYED RELEASE ORAL TWICE DAILY
Qty: 30 | Refills: 1 | Status: ACTIVE | COMMUNITY
Start: 2024-06-03 | End: 1900-01-01

## 2024-06-03 NOTE — HISTORY OF PRESENT ILLNESS
[de-identified] : Pt comes for med renewal  Pt is out work due to rt wrist fracture   Going to PT  needs med renewal as she is anxious about injury

## 2024-06-03 NOTE — PHYSICAL EXAM
[No Acute Distress] : no acute distress [Normal Sclera/Conjunctiva] : normal sclera/conjunctiva [Normal Outer Ear/Nose] : the outer ears and nose were normal in appearance [No JVD] : no jugular venous distention [No Lymphadenopathy] : no lymphadenopathy [Supple] : supple [No Respiratory Distress] : no respiratory distress  [No Accessory Muscle Use] : no accessory muscle use [Soft] : abdomen soft [Non Tender] : non-tender [Non-distended] : non-distended

## 2024-06-08 ENCOUNTER — NON-APPOINTMENT (OUTPATIENT)
Age: 59
End: 2024-06-08

## 2024-06-17 ENCOUNTER — APPOINTMENT (OUTPATIENT)
Dept: ORTHOPEDIC SURGERY | Facility: CLINIC | Age: 59
End: 2024-06-17
Payer: COMMERCIAL

## 2024-06-17 PROCEDURE — 99024 POSTOP FOLLOW-UP VISIT: CPT

## 2024-06-17 PROCEDURE — 73110 X-RAY EXAM OF WRIST: CPT | Mod: RT

## 2024-06-17 NOTE — PHYSICAL EXAM
[de-identified] : Examination of her right wrist and hand after the cast was removed demonstrates her incisions to be healing well.  There is decreased swelling.  She has improved flexion of the digits into the palm.  She has complaints of some residual decreased sensation to light touch throughout the digits, but has intact sensation to painful stimuli. [de-identified] : PA, lateral, oblique and 20 degree to lateral views of her right wrist demonstrate her distal radius fracture and hardware to be in acceptable alignment.  Again, as previously noted, one of the screw heads has slightly backed out but it is no worse than previously, and appears more in position.  Again, this was a highly comminuted and displaced fracture with multiple fragments, but the articular surface is in overall good alignment.

## 2024-06-17 NOTE — HISTORY OF PRESENT ILLNESS
[FreeTextEntry1] : Workmen's Compensation case Date of accident: 5/2/2024 Working: No Degree of disability: 100% from her occupation  25 days status post open reduction and internal fixation of displaced right distal radius fracture and right open carpal tunnel release.  Date of surgery: 5/9/2024.  She is still having numbness and tingling, which she feels has not improved since her surgery. She also has severe pain and a burning sensation in her wrist. She has been taking Tylenol as needed to manage symptoms. 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

## 2024-06-17 NOTE — END OF VISIT
[FreeTextEntry3] : This note was written by Raymon Weinberg on 06/17/2024 acting solely as a scribe for Dr. Scar Henry.   All medical record entries made by the Scribe were at my, Dr. Scar Henry, direction and personally dictated by me on 06/17/2024. I have personally reviewed the chart and agree that the record accurately reflects my personal performance of the history, physical exam, assessment and plan.

## 2024-06-17 NOTE — ADDENDUM
[FreeTextEntry1] : I, Raymon Weinberg, acted solely as a scribe for Dr. Henry on this date on 06/17/2024.

## 2024-06-17 NOTE — PHYSICAL EXAM
[de-identified] : Examination of her right wrist and hand demonstrates her incisions to be healing well.  There is decreased swelling.  She has improved flexion of the digits into the palm and can flex the digits into the palm.  She has approximately 20 degrees of wrist flexion and extension with soft endpoints.  She has intact sensation to light touch distally along the radial, ulnar and median nerve distributions. [de-identified] : PA, lateral, oblique and 20 degree to lateral views of her right wrist demonstrate her distal radius fracture and hardware to be in acceptable alignment.  There was significant dorsal comminution and she appears to have some settling on the lateral, but good alignment of the articular surface.  Again, this was a highly comminuted and displaced fracture with multiple fragments, but the articular surface is in overall good alignment.

## 2024-06-17 NOTE — END OF VISIT
[FreeTextEntry3] : This note was written by Raymon Weinberg on 6/3/24 acting solely as a scribe for Dr. Scar Henry.   All medical record entries made by the Scribe were at my, Dr. Scar Henry, direction and personally dictated by me on 6/3/24. I have personally reviewed the chart and agree that the record accurately reflects my personal performance of the history, physical exam, assessment and plan.

## 2024-06-17 NOTE — ADDENDUM
[FreeTextEntry1] : I, Raymon Weinberg, acted solely as a scribe for Dr. Henry on this date on 6/3/24.

## 2024-06-17 NOTE — HISTORY OF PRESENT ILLNESS
[FreeTextEntry1] : Workmen's Compensation case Date of accident: 5/2/2024 Working: No Degree of disability: 100% from her occupation  39 days status post open reduction and internal fixation of displaced right distal radius fracture and right open carpal tunnel release.  Date of surgery: 5/9/2024.  She is doing well. She feels some improvement in her symptoms, but she still feels some soreness and burning pain in her wrist and fingers. She also notes more severe pain along the ulnar aspect of her right wrist.  She is scheduled to start occupational therapy today. 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

## 2024-06-17 NOTE — PROCEDURE
[FreeTextEntry1] : She was placed into a well-padded well molded right short arm fiberglass splint.  She was instructed on splint care until she can obtain a removable splint.

## 2024-06-17 NOTE — DISCUSSION/SUMMARY
[FreeTextEntry1] : At this time, she was instructed on splinting of her right wrist and to begin occupational therapy in 10-14 days.  She was placed into a fiberglass short arm splint today, as she is not certain that she will be able to get a splint for the next day or 2.  A therapy referral was provided. She was also instructed on range of motion exercises and scar massage. She will follow up in 2 weeks to assess her progress.

## 2024-06-26 ENCOUNTER — NON-APPOINTMENT (OUTPATIENT)
Age: 59
End: 2024-06-26

## 2024-07-01 ENCOUNTER — APPOINTMENT (OUTPATIENT)
Dept: BARIATRICS | Facility: CLINIC | Age: 59
End: 2024-07-01
Payer: COMMERCIAL

## 2024-07-01 VITALS
OXYGEN SATURATION: 98 % | HEART RATE: 80 BPM | SYSTOLIC BLOOD PRESSURE: 130 MMHG | TEMPERATURE: 96.7 F | WEIGHT: 285 LBS | BODY MASS INDEX: 45.8 KG/M2 | HEIGHT: 66 IN | DIASTOLIC BLOOD PRESSURE: 80 MMHG

## 2024-07-01 DIAGNOSIS — R63.4 ABNORMAL WEIGHT LOSS: ICD-10-CM

## 2024-07-01 DIAGNOSIS — Z98.84 BARIATRIC SURGERY STATUS: ICD-10-CM

## 2024-07-01 DIAGNOSIS — E66.01 MORBID (SEVERE) OBESITY DUE TO EXCESS CALORIES: ICD-10-CM

## 2024-07-01 PROBLEM — S52.571D OTHER CLOSED INTRA-ARTICULAR FRACTURE OF DISTAL END OF RIGHT RADIUS WITH ROUTINE HEALING, SUBSEQUENT ENCOUNTER: Status: ACTIVE | Noted: 2024-05-08

## 2024-07-01 PROBLEM — G56.01 CARPAL TUNNEL SYNDROME OF RIGHT WRIST: Status: ACTIVE | Noted: 2024-05-03

## 2024-07-01 PROCEDURE — G2211 COMPLEX E/M VISIT ADD ON: CPT

## 2024-07-01 PROCEDURE — 99214 OFFICE O/P EST MOD 30 MIN: CPT

## 2024-07-08 ENCOUNTER — APPOINTMENT (OUTPATIENT)
Dept: ORTHOPEDIC SURGERY | Facility: CLINIC | Age: 59
End: 2024-07-08
Payer: OTHER MISCELLANEOUS

## 2024-07-08 DIAGNOSIS — G56.01 CARPAL TUNNEL SYNDROME, RIGHT UPPER LIMB: ICD-10-CM

## 2024-07-08 DIAGNOSIS — S52.571D OTHER INTRAARTICULAR FRACTURE OF LOWER END OF RIGHT RADIUS, SUBSEQUENT ENCOUNTER FOR CLOSED FRACTURE WITH ROUTINE HEALING: ICD-10-CM

## 2024-07-08 PROCEDURE — 99024 POSTOP FOLLOW-UP VISIT: CPT

## 2024-07-08 PROCEDURE — 73110 X-RAY EXAM OF WRIST: CPT | Mod: RT

## 2024-07-25 ENCOUNTER — NON-APPOINTMENT (OUTPATIENT)
Age: 59
End: 2024-07-25

## 2024-07-25 NOTE — PHYSICAL EXAM
[de-identified] : Examination of her right wrist and hand demonstrates her incisions to be well-healed.  There is decreased swelling.  She has improved flexion of the digits into the palm and can flex the digits into the palm.  She has approximately 40 degrees of wrist flexion and extension with soft endpoints.  She has 75 degrees of pronation supination.  She has persistent ulnar-sided wrist pain with rotation.  She has intact sensation to light touch along the median nerve distribution and radial nerve distribution but she has complaints of numbness and tingling along the ulnar nerve distribution today.  Provocative signs for cubital tunnel syndrome are negative. [de-identified] : PA, lateral, oblique and 20 degree to lateral views of her right wrist demonstrate her distal radius fracture and hardware to be in acceptable alignment.  There was significant dorsal comminution and she appears to have some settling on the lateral, but good alignment of the articular surface.  Again, this was a highly comminuted and displaced fracture with multiple fragments, but the articular surface is in overall good alignment.  2 of the screw heads are slightly prominent.

## 2024-07-25 NOTE — HISTORY OF PRESENT ILLNESS
[FreeTextEntry1] : Workmen's Compensation case Date of accident: 5/2/2024 Working: Yes.  Light duty. Degree of disability: 50% from her occupation  88 days status post open reduction and internal fixation of displaced right distal radius fracture and right open carpal tunnel release.  Date of surgery: 5/9/2024.  She is in hand therapy.  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 [Has the patient missed work because of the injury/illness?] : The patient has missed work because of the injury/illness. [Yes] : The patient is currently working. [FreeTextEntry7] : Limited duty

## 2024-07-25 NOTE — DISCUSSION/SUMMARY
[FreeTextEntry1] : She was instructed on continued hand therapy, range of motion exercises and scar massage and desensitization.  She was given an updated prescription for therapy.  She will follow-up in 4 weeks.  With regard to the numbness along the ulnar nerve distribution,   She wishes to continue to work at 50% duty.  She was given an updated note.

## 2024-07-29 ENCOUNTER — RX RENEWAL (OUTPATIENT)
Age: 59
End: 2024-07-29

## 2024-08-05 ENCOUNTER — APPOINTMENT (OUTPATIENT)
Dept: INTERNAL MEDICINE | Facility: CLINIC | Age: 59
End: 2024-08-05

## 2024-08-05 ENCOUNTER — APPOINTMENT (OUTPATIENT)
Dept: ORTHOPEDIC SURGERY | Facility: CLINIC | Age: 59
End: 2024-08-05

## 2024-08-05 PROBLEM — M65.341 TRIGGER RING FINGER OF RIGHT HAND: Status: ACTIVE | Noted: 2024-08-05

## 2024-08-05 PROBLEM — G56.21 CUBITAL TUNNEL SYNDROME ON RIGHT: Status: ACTIVE | Noted: 2024-08-05

## 2024-08-05 PROCEDURE — 73110 X-RAY EXAM OF WRIST: CPT | Mod: RT

## 2024-08-05 PROCEDURE — 99214 OFFICE O/P EST MOD 30 MIN: CPT

## 2024-08-05 PROCEDURE — 20550 NJX 1 TENDON SHEATH/LIGAMENT: CPT | Mod: 79,RT

## 2024-08-05 PROCEDURE — G2211 COMPLEX E/M VISIT ADD ON: CPT

## 2024-08-05 NOTE — PHYSICAL EXAM
[de-identified] : Examination of her right wrist and hand demonstrates her incisions to be well-healed.  There is decreased swelling.  She has improved flexion of the digits into the palm and can flex the digits into the palm.  There is mild triggering of the right ring finger without swelling or tenderness along the A1 pulley.  She has approximately 40 degrees of wrist flexion and extension with soft endpoints.  She has 75 degrees of pronation supination.  She has intact sensation to light touch along the median nerve distribution and radial nerve distribution but she has complaints of numbness and tingling along the ulnar nerve distribution today.  Provocative signs for carpal tunnel syndrome are negative, but today, provocative signs cubital tunnel syndrome are positive. [de-identified] : PA, lateral, oblique and 20 degree to lateral views of her right wrist demonstrate her distal radius fracture and hardware to be in acceptable alignment.  The fracture is essentially healed.  There was significant dorsal comminution and she appears to have some settling on the lateral, but good alignment of the articular surface.  Again, this was a highly comminuted and displaced fracture with multiple fragments, but the articular surface is in overall good alignment.  2 of the screw heads are slightly prominent.

## 2024-08-05 NOTE — ADDENDUM
[FreeTextEntry1] :  I, Clinton Wall, acted solely as a scribe for Dr. Henry on this date on 08/05/2024.

## 2024-08-05 NOTE — PROCEDURE
[FreeTextEntry1] :  - After discussion of risks and benefits, the patient agreed to proceed with a cortisone injection. - Side: right - Finger: ring finger trigger finger - Medications: 0.5cc of 1% Lidocaine and 1cc of Celestone Solupan, 6mg/cc, using sterile technique. - Patient tolerated the procedure well without complications. - Patient was told that the symptoms may worsen for a day or two, and should then begin to improve. - Instructions: Patient was instructed on activity modification for the next several days. - Follow-up: Within 4 weeks to assess response to the injection.

## 2024-08-05 NOTE — HISTORY OF PRESENT ILLNESS
[de-identified] : Pt comes for med renewal  Anxious about 88 yr old mother   Losing weight on zepbound

## 2024-08-05 NOTE — END OF VISIT
[FreeTextEntry3] : This note was written by Clinton Wall on 08/05/2024 acting solely as a scribe for Dr. Scar Henry.   All medical record entries made by the Scribe were at my, Dr. Scar Henry, direction and personally dictated by me on 08/05/2024. I have personally reviewed the chart and agree that the record accurately reflects my personal performance of the history, physical exam, assessment and plan.

## 2024-08-05 NOTE — HISTORY OF PRESENT ILLNESS
[FreeTextEntry1] : Workmen's Compensation case Date of accident: 5/2/2024 Working: Yes.  Light duty. Degree of disability: 50% from her occupation  88 days status post open reduction and internal fixation of displaced right distal radius fracture and right open carpal tunnel release.  Date of surgery: 5/9/2024.  She is in hand therapy.  She is overall doing well today. She reports clicking, swelling, stiffness, numbness and tingling. She rates her pain as a 4/10. She denies radiating pain.  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 [FreeTextEntry7] : Limited duty

## 2024-08-05 NOTE — PHYSICAL EXAM
[de-identified] : Examination of her right wrist and hand demonstrates her incisions to be well-healed.  There is decreased swelling.  She has improved flexion of the digits into the palm and can flex the digits into the palm.  There is mild triggering of the right ring finger without swelling or tenderness along the A1 pulley.  She has approximately 40 degrees of wrist flexion and extension with soft endpoints.  She has 75 degrees of pronation supination.  She has intact sensation to light touch along the median nerve distribution and radial nerve distribution but she has complaints of numbness and tingling along the ulnar nerve distribution today.  Provocative signs for carpal tunnel syndrome are negative, but today, provocative signs cubital tunnel syndrome are positive. [de-identified] : PA, lateral, oblique and 20 degree to lateral views of her right wrist demonstrate her distal radius fracture and hardware to be in acceptable alignment.  The fracture is essentially healed.  There was significant dorsal comminution and she appears to have some settling on the lateral, but good alignment of the articular surface.  Again, this was a highly comminuted and displaced fracture with multiple fragments, but the articular surface is in overall good alignment.  2 of the screw heads are slightly prominent.

## 2024-08-06 ENCOUNTER — RX RENEWAL (OUTPATIENT)
Age: 59
End: 2024-08-06

## 2024-08-13 ENCOUNTER — RX RENEWAL (OUTPATIENT)
Age: 59
End: 2024-08-13

## 2024-08-26 ENCOUNTER — APPOINTMENT (OUTPATIENT)
Dept: CARDIOLOGY | Facility: CLINIC | Age: 59
End: 2024-08-26

## 2024-08-29 ENCOUNTER — NON-APPOINTMENT (OUTPATIENT)
Age: 59
End: 2024-08-29

## 2024-08-29 NOTE — PHYSICAL EXAM
[de-identified] : Examination of her right wrist and hand demonstrates her incisions to be well-healed.  There is decreased swelling.  She has improved flexion of the digits into the palm and can flex the digits into the palm.  There is mild triggering of the right ring finger without swelling or tenderness along the A1 pulley.  She has approximately 40 degrees of wrist flexion and extension with soft endpoints.  She has 75 degrees of pronation supination.  She has intact sensation to light touch along the median nerve distribution and radial nerve distribution but she has complaints of numbness and tingling along the ulnar nerve distribution today.  Provocative signs for carpal tunnel syndrome are negative, but today, provocative signs cubital tunnel syndrome are positive. [de-identified] : PA, lateral, oblique and 20 degree to lateral views of her right wrist dated 8/5/2024 demonstrated her distal radius fracture and hardware to be in acceptable alignment.  The fracture is essentially healed.  There was significant dorsal comminution and she appears to have some settling on the lateral, but good alignment of the articular surface.  Again, this was a highly comminuted and displaced fracture with multiple fragments, but the articular surface is in overall good alignment.  2 of the screw heads are slightly prominent.

## 2024-08-29 NOTE — HISTORY OF PRESENT ILLNESS
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  Light duty. Degree of disability: 50% from her occupation  Greater than 4 months status post open reduction and internal fixation of displaced right distal radius fracture and right open carpal tunnel release.  Date of surgery: 5/9/2024.  See note from when she was seen the office 5 weeks ago.  She no longer had numbness and tingling along the median nerve distribution but she had persistent numbness and tingling along the ulnar nerve distribution and I ordered EMGs.  She is in hand therapy.  She is  [FreeTextEntry4] : The fracture was reduced in the emergency room yesterday. [Has the patient missed work because of the injury/illness?] : The patient has missed work because of the injury/illness. [Yes] : The patient is currently working. [FreeTextEntry7] : Limited duty

## 2024-09-09 ENCOUNTER — APPOINTMENT (OUTPATIENT)
Dept: ORTHOPEDIC SURGERY | Facility: CLINIC | Age: 59
End: 2024-09-09
Payer: COMMERCIAL

## 2024-09-09 DIAGNOSIS — M65.341 TRIGGER FINGER, RIGHT RING FINGER: ICD-10-CM

## 2024-09-09 DIAGNOSIS — S52.571D OTHER INTRAARTICULAR FRACTURE OF LOWER END OF RIGHT RADIUS, SUBSEQUENT ENCOUNTER FOR CLOSED FRACTURE WITH ROUTINE HEALING: ICD-10-CM

## 2024-09-09 DIAGNOSIS — G56.21 LESION OF ULNAR NERVE, RIGHT UPPER LIMB: ICD-10-CM

## 2024-09-09 DIAGNOSIS — G56.01 CARPAL TUNNEL SYNDROME, RIGHT UPPER LIMB: ICD-10-CM

## 2024-09-09 PROCEDURE — 99214 OFFICE O/P EST MOD 30 MIN: CPT

## 2024-09-09 NOTE — ADDENDUM
[FreeTextEntry1] :  I, Clinton Wall, acted solely as a scribe for Dr. Henry on this date on 09/09/2024.

## 2024-09-09 NOTE — HISTORY OF PRESENT ILLNESS
[FreeTextEntry1] : Workmen's Compensation case Date of accident: 5/2/2024 Working: Yes.   Degree of disability: 75% from her occupation  Greater than 4 months status post open reduction and internal fixation of displaced right distal radius fracture and right open carpal tunnel release.  Date of surgery: 5/9/2024.  See note from when she was seen the office 5 weeks ago.  She no longer had numbness and tingling along the median nerve distribution but she had persistent numbness and tingling along the ulnar nerve distribution and I ordered EMGs.  She is in hand therapy.  She is overall doing well today. She reports that her previous cortisone injection helped provide her symptoms with regard to the right ring finger trigger finger. She had EMGs done with Dr. Carlos Eduardo Butler 2-3 weeks ago.  However, she does not have the report available for my review.  She reports numbness and tightness. She rates her pain as a 6/10.  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 [FreeTextEntry7] : Limited duty

## 2024-09-09 NOTE — PHYSICAL EXAM
[de-identified] : Examination of her right wrist and hand demonstrates her incisions to be well-healed.  There is decreased swelling.  She has full flexion and extension of the digits.  There is no swelling or tenderness along the A1 pulley of the ring finger and it is no longer triggering.  She has approximately 40 degrees of wrist flexion and extension with soft endpoints.  She has 75 degrees of pronation supination.  She has intact sensation to light touch along the median nerve distribution and radial nerve distributions, but she has complaints of numbness and tingling along the ulnar nerve distribution today.  Provocative signs for carpal tunnel syndrome are negative.  Provocative signs for cubital tunnel syndrome today are equivocal [de-identified] : PA, lateral, oblique and 20 degree to lateral views of her right wrist dated 8/5/2024 demonstrated her distal radius fracture and hardware to be in acceptable alignment.  The fracture is essentially healed.  There was significant dorsal comminution and she appears to have some settling on the lateral, but good alignment of the articular surface.  Again, this was a highly comminuted and displaced fracture with multiple fragments, but the articular surface is in overall good alignment.  2 of the screw heads are slightly prominent.

## 2024-09-09 NOTE — END OF VISIT
[FreeTextEntry3] : This note was written by Clinton Wall on 09/09/2024 acting solely as a scribe for Dr. Scar Henry.   All medical record entries made by the Scribe were at my, Dr. Scar Henry, direction and personally dictated by me on 09/09/2024. I have personally reviewed the chart and agree that the record accurately reflects my personal performance of the history, physical exam, assessment and plan.

## 2024-09-09 NOTE — PHYSICAL EXAM
[de-identified] : Examination of her right wrist and hand demonstrates her incisions to be well-healed.  There is decreased swelling.  She has full flexion and extension of the digits.  There is no swelling or tenderness along the A1 pulley of the ring finger and it is no longer triggering.  She has approximately 40 degrees of wrist flexion and extension with soft endpoints.  She has 75 degrees of pronation supination.  She has intact sensation to light touch along the median nerve distribution and radial nerve distributions, but she has complaints of numbness and tingling along the ulnar nerve distribution today.  Provocative signs for carpal tunnel syndrome are negative.  Provocative signs for cubital tunnel syndrome today are equivocal [de-identified] : PA, lateral, oblique and 20 degree to lateral views of her right wrist dated 8/5/2024 demonstrated her distal radius fracture and hardware to be in acceptable alignment.  The fracture is essentially healed.  There was significant dorsal comminution and she appears to have some settling on the lateral, but good alignment of the articular surface.  Again, this was a highly comminuted and displaced fracture with multiple fragments, but the articular surface is in overall good alignment.  2 of the screw heads are slightly prominent.

## 2024-09-10 ENCOUNTER — NON-APPOINTMENT (OUTPATIENT)
Age: 59
End: 2024-09-10

## 2024-10-01 ENCOUNTER — RX RENEWAL (OUTPATIENT)
Age: 59
End: 2024-10-01

## 2024-10-07 ENCOUNTER — APPOINTMENT (OUTPATIENT)
Dept: BARIATRICS | Facility: CLINIC | Age: 59
End: 2024-10-07
Payer: COMMERCIAL

## 2024-10-07 ENCOUNTER — APPOINTMENT (OUTPATIENT)
Dept: INTERNAL MEDICINE | Facility: CLINIC | Age: 59
End: 2024-10-07
Payer: COMMERCIAL

## 2024-10-07 VITALS — OXYGEN SATURATION: 96 % | HEART RATE: 100 BPM | WEIGHT: 274 LBS | HEIGHT: 66 IN | BODY MASS INDEX: 44.03 KG/M2

## 2024-10-07 VITALS
TEMPERATURE: 98.4 F | SYSTOLIC BLOOD PRESSURE: 149 MMHG | WEIGHT: 274.47 LBS | HEIGHT: 66 IN | HEART RATE: 86 BPM | OXYGEN SATURATION: 95 % | DIASTOLIC BLOOD PRESSURE: 87 MMHG | BODY MASS INDEX: 44.11 KG/M2

## 2024-10-07 VITALS — DIASTOLIC BLOOD PRESSURE: 82 MMHG | SYSTOLIC BLOOD PRESSURE: 140 MMHG

## 2024-10-07 DIAGNOSIS — E03.9 HYPOTHYROIDISM, UNSPECIFIED: ICD-10-CM

## 2024-10-07 DIAGNOSIS — Z01.812 ENCOUNTER FOR PREPROCEDURAL LABORATORY EXAMINATION: ICD-10-CM

## 2024-10-07 DIAGNOSIS — E66.01 MORBID (SEVERE) OBESITY DUE TO EXCESS CALORIES: ICD-10-CM

## 2024-10-07 DIAGNOSIS — Z98.84 BARIATRIC SURGERY STATUS: ICD-10-CM

## 2024-10-07 DIAGNOSIS — I10 ESSENTIAL (PRIMARY) HYPERTENSION: ICD-10-CM

## 2024-10-07 DIAGNOSIS — Z00.00 ENCOUNTER FOR GENERAL ADULT MEDICAL EXAMINATION W/OUT ABNORMAL FINDINGS: ICD-10-CM

## 2024-10-07 DIAGNOSIS — Z79.899 OTHER LONG TERM (CURRENT) DRUG THERAPY: ICD-10-CM

## 2024-10-07 DIAGNOSIS — Z12.11 ENCOUNTER FOR SCREENING FOR MALIGNANT NEOPLASM OF COLON: ICD-10-CM

## 2024-10-07 DIAGNOSIS — R63.4 ABNORMAL WEIGHT LOSS: ICD-10-CM

## 2024-10-07 DIAGNOSIS — Z23 ENCOUNTER FOR IMMUNIZATION: ICD-10-CM

## 2024-10-07 DIAGNOSIS — G47.00 INSOMNIA, UNSPECIFIED: ICD-10-CM

## 2024-10-07 PROCEDURE — 36415 COLL VENOUS BLD VENIPUNCTURE: CPT

## 2024-10-07 PROCEDURE — 90656 IIV3 VACC NO PRSV 0.5 ML IM: CPT

## 2024-10-07 PROCEDURE — G0008: CPT

## 2024-10-07 PROCEDURE — 99214 OFFICE O/P EST MOD 30 MIN: CPT | Mod: 25

## 2024-10-07 PROCEDURE — G2211 COMPLEX E/M VISIT ADD ON: CPT | Mod: NC

## 2024-10-07 PROCEDURE — 99214 OFFICE O/P EST MOD 30 MIN: CPT

## 2024-10-07 RX ORDER — LAMOTRIGINE 25 MG/1
TABLET ORAL
Refills: 0 | Status: ACTIVE | COMMUNITY

## 2024-10-09 LAB
ALBUMIN SERPL ELPH-MCNC: 4.3 G/DL
ALP BLD-CCNC: 98 U/L
ALT SERPL-CCNC: 13 U/L
ANION GAP SERPL CALC-SCNC: 12 MMOL/L
AST SERPL-CCNC: 15 U/L
BASOPHILS # BLD AUTO: 0.05 K/UL
BASOPHILS NFR BLD AUTO: 0.6 %
BILIRUB SERPL-MCNC: 0.3 MG/DL
BUN SERPL-MCNC: 16 MG/DL
CALCIUM SERPL-MCNC: 9.9 MG/DL
CHLORIDE SERPL-SCNC: 104 MMOL/L
CHOLEST SERPL-MCNC: 205 MG/DL
CO2 SERPL-SCNC: 24 MMOL/L
CREAT SERPL-MCNC: 0.76 MG/DL
EGFR: 90 ML/MIN/1.73M2
EOSINOPHIL # BLD AUTO: 0.28 K/UL
EOSINOPHIL NFR BLD AUTO: 3.1 %
ESTIMATED AVERAGE GLUCOSE: 103 MG/DL
GLUCOSE SERPL-MCNC: 100 MG/DL
HBA1C MFR BLD HPLC: 5.2 %
HCT VFR BLD CALC: 47.3 %
HDLC SERPL-MCNC: 55 MG/DL
HGB BLD-MCNC: 15.3 G/DL
IMM GRANULOCYTES NFR BLD AUTO: 0.3 %
LDLC SERPL CALC-MCNC: 112 MG/DL
LYMPHOCYTES # BLD AUTO: 2.1 K/UL
LYMPHOCYTES NFR BLD AUTO: 23.6 %
MAN DIFF?: NORMAL
MCHC RBC-ENTMCNC: 32 PG
MCHC RBC-ENTMCNC: 32.3 GM/DL
MCV RBC AUTO: 99 FL
MONOCYTES # BLD AUTO: 0.77 K/UL
MONOCYTES NFR BLD AUTO: 8.7 %
NEUTROPHILS # BLD AUTO: 5.67 K/UL
NEUTROPHILS NFR BLD AUTO: 63.7 %
NONHDLC SERPL-MCNC: 149 MG/DL
PLATELET # BLD AUTO: 317 K/UL
POTASSIUM SERPL-SCNC: 5.2 MMOL/L
PROT SERPL-MCNC: 6.5 G/DL
RBC # BLD: 4.78 M/UL
RBC # FLD: 13.6 %
SODIUM SERPL-SCNC: 140 MMOL/L
TRIGL SERPL-MCNC: 216 MG/DL
TSH SERPL-ACNC: 0.52 UIU/ML
WBC # FLD AUTO: 8.9 K/UL

## 2024-10-10 ENCOUNTER — NON-APPOINTMENT (OUTPATIENT)
Age: 59
End: 2024-10-10

## 2024-10-21 ENCOUNTER — APPOINTMENT (OUTPATIENT)
Dept: ORTHOPEDIC SURGERY | Facility: CLINIC | Age: 59
End: 2024-10-21
Payer: OTHER MISCELLANEOUS

## 2024-10-21 DIAGNOSIS — M65.341 TRIGGER FINGER, RIGHT RING FINGER: ICD-10-CM

## 2024-10-21 DIAGNOSIS — S52.571D OTHER INTRAARTICULAR FRACTURE OF LOWER END OF RIGHT RADIUS, SUBSEQUENT ENCOUNTER FOR CLOSED FRACTURE WITH ROUTINE HEALING: ICD-10-CM

## 2024-10-21 DIAGNOSIS — M65.311 TRIGGER THUMB, RIGHT THUMB: ICD-10-CM

## 2024-10-21 DIAGNOSIS — G56.01 CARPAL TUNNEL SYNDROME, RIGHT UPPER LIMB: ICD-10-CM

## 2024-10-21 DIAGNOSIS — G56.21 LESION OF ULNAR NERVE, RIGHT UPPER LIMB: ICD-10-CM

## 2024-10-21 PROCEDURE — 73110 X-RAY EXAM OF WRIST: CPT | Mod: RT

## 2024-10-21 PROCEDURE — 99214 OFFICE O/P EST MOD 30 MIN: CPT | Mod: 25

## 2024-10-21 PROCEDURE — 20550 NJX 1 TENDON SHEATH/LIGAMENT: CPT | Mod: RT

## 2024-10-21 RX ORDER — BETAMETHA AC,SOD PHOS/WATER/PF 6 MG/ML
6 (3-3) VIAL (ML) INJECTION
Qty: 1 | Refills: 0 | Status: COMPLETED | OUTPATIENT
Start: 2024-10-21

## 2024-10-21 RX ORDER — LIDOCAINE HYDROCHLORIDE 10 MG/ML
1 INJECTION, SOLUTION INFILTRATION; PERINEURAL
Qty: 0 | Refills: 0 | Status: COMPLETED | OUTPATIENT
Start: 2024-10-21

## 2024-10-21 RX ADMIN — LIDOCAINE HYDROCHLORIDE 0.5 %: 10 INJECTION, SOLUTION INFILTRATION; PERINEURAL at 00:00

## 2024-10-21 RX ADMIN — BETAMETHASONE ACETATE AND BETAMETHASONE SODIUM PHOSPHATE 1 MG/ML: 3; 3 INJECTION, SUSPENSION INTRA-ARTICULAR; INTRALESIONAL; INTRAMUSCULAR; SOFT TISSUE at 00:00

## 2024-10-24 ENCOUNTER — RX RENEWAL (OUTPATIENT)
Age: 59
End: 2024-10-24

## 2024-10-31 ENCOUNTER — NON-APPOINTMENT (OUTPATIENT)
Age: 59
End: 2024-10-31

## 2024-11-01 LAB — NONINV COLON CA DNA+OCC BLD SCRN STL QL: POSITIVE

## 2024-11-18 ENCOUNTER — APPOINTMENT (OUTPATIENT)
Dept: INTERNAL MEDICINE | Facility: CLINIC | Age: 59
End: 2024-11-18
Payer: COMMERCIAL

## 2024-11-18 DIAGNOSIS — M25.551 PAIN IN RIGHT HIP: ICD-10-CM

## 2024-11-18 DIAGNOSIS — F41.9 ANXIETY DISORDER, UNSPECIFIED: ICD-10-CM

## 2024-11-18 DIAGNOSIS — G62.9 POLYNEUROPATHY, UNSPECIFIED: ICD-10-CM

## 2024-11-18 DIAGNOSIS — G89.29 PAIN IN RIGHT HIP: ICD-10-CM

## 2024-11-18 PROCEDURE — G2211 COMPLEX E/M VISIT ADD ON: CPT | Mod: NC,95

## 2024-11-18 PROCEDURE — 99213 OFFICE O/P EST LOW 20 MIN: CPT | Mod: 95

## 2024-11-18 RX ORDER — OMEPRAZOLE 20 MG/1
20 CAPSULE, DELAYED RELEASE ORAL
Qty: 90 | Refills: 0 | Status: ACTIVE | COMMUNITY
Start: 2024-11-18 | End: 1900-01-01

## 2024-11-20 ENCOUNTER — NON-APPOINTMENT (OUTPATIENT)
Age: 59
End: 2024-11-20

## 2024-12-02 ENCOUNTER — APPOINTMENT (OUTPATIENT)
Dept: ORTHOPEDIC SURGERY | Facility: CLINIC | Age: 59
End: 2024-12-02
Payer: OTHER MISCELLANEOUS

## 2024-12-02 DIAGNOSIS — M65.341 TRIGGER FINGER, RIGHT RING FINGER: ICD-10-CM

## 2024-12-02 DIAGNOSIS — M65.311 TRIGGER THUMB, RIGHT THUMB: ICD-10-CM

## 2024-12-02 DIAGNOSIS — G56.01 CARPAL TUNNEL SYNDROME, RIGHT UPPER LIMB: ICD-10-CM

## 2024-12-02 DIAGNOSIS — G56.21 LESION OF ULNAR NERVE, RIGHT UPPER LIMB: ICD-10-CM

## 2024-12-02 DIAGNOSIS — S52.571D OTHER INTRAARTICULAR FRACTURE OF LOWER END OF RIGHT RADIUS, SUBSEQUENT ENCOUNTER FOR CLOSED FRACTURE WITH ROUTINE HEALING: ICD-10-CM

## 2024-12-02 PROCEDURE — 99214 OFFICE O/P EST MOD 30 MIN: CPT | Mod: 25

## 2024-12-02 PROCEDURE — 73110 X-RAY EXAM OF WRIST: CPT | Mod: RT

## 2024-12-02 PROCEDURE — 20550 NJX 1 TENDON SHEATH/LIGAMENT: CPT | Mod: RT

## 2024-12-02 RX ORDER — LIDOCAINE HYDROCHLORIDE 10 MG/ML
1 INJECTION, SOLUTION INFILTRATION; PERINEURAL
Refills: 0 | Status: COMPLETED | OUTPATIENT
Start: 2024-12-02

## 2024-12-02 RX ORDER — BETAMETHA AC,SOD PHOS/WATER/PF 6 MG/ML
6 (3-3) VIAL (ML) INJECTION
Qty: 2 | Refills: 0 | Status: COMPLETED | OUTPATIENT
Start: 2024-12-02

## 2024-12-02 RX ADMIN — LIDOCAINE HYDROCHLORIDE 0.5 %: 10 INJECTION, SOLUTION EPIDURAL; INFILTRATION; INTRACAUDAL; PERINEURAL at 00:00

## 2024-12-02 RX ADMIN — BETAMETHASONE ACETATE AND BETAMETHASONE SODIUM PHOSPHATE 1 MG/ML: 3; 3 INJECTION, SUSPENSION INTRA-ARTICULAR; INTRALESIONAL; INTRAMUSCULAR; SOFT TISSUE at 00:00

## 2024-12-30 ENCOUNTER — RX RENEWAL (OUTPATIENT)
Age: 59
End: 2024-12-30

## 2025-01-03 ENCOUNTER — NON-APPOINTMENT (OUTPATIENT)
Age: 60
End: 2025-01-03

## 2025-01-06 ENCOUNTER — APPOINTMENT (OUTPATIENT)
Dept: BARIATRICS | Facility: CLINIC | Age: 60
End: 2025-01-06
Payer: COMMERCIAL

## 2025-01-06 ENCOUNTER — APPOINTMENT (OUTPATIENT)
Dept: INTERNAL MEDICINE | Facility: CLINIC | Age: 60
End: 2025-01-06
Payer: COMMERCIAL

## 2025-01-06 VITALS
WEIGHT: 261.68 LBS | DIASTOLIC BLOOD PRESSURE: 81 MMHG | OXYGEN SATURATION: 95 % | BODY MASS INDEX: 42.06 KG/M2 | SYSTOLIC BLOOD PRESSURE: 132 MMHG | TEMPERATURE: 97.2 F | HEIGHT: 66 IN | HEART RATE: 88 BPM

## 2025-01-06 VITALS — BODY MASS INDEX: 41.95 KG/M2 | OXYGEN SATURATION: 99 % | HEIGHT: 66 IN | HEART RATE: 95 BPM | WEIGHT: 261 LBS

## 2025-01-06 VITALS — DIASTOLIC BLOOD PRESSURE: 80 MMHG | SYSTOLIC BLOOD PRESSURE: 130 MMHG

## 2025-01-06 DIAGNOSIS — Z76.89 PERSONS ENCOUNTERING HEALTH SERVICES IN OTHER SPECIFIED CIRCUMSTANCES: ICD-10-CM

## 2025-01-06 DIAGNOSIS — F41.9 ANXIETY DISORDER, UNSPECIFIED: ICD-10-CM

## 2025-01-06 DIAGNOSIS — E66.01 MORBID (SEVERE) OBESITY DUE TO EXCESS CALORIES: ICD-10-CM

## 2025-01-06 DIAGNOSIS — Z79.899 OTHER LONG TERM (CURRENT) DRUG THERAPY: ICD-10-CM

## 2025-01-06 DIAGNOSIS — I10 ESSENTIAL (PRIMARY) HYPERTENSION: ICD-10-CM

## 2025-01-06 DIAGNOSIS — R63.4 ABNORMAL WEIGHT LOSS: ICD-10-CM

## 2025-01-06 DIAGNOSIS — Z76.0 ENCOUNTER FOR ISSUE OF REPEAT PRESCRIPTION: ICD-10-CM

## 2025-01-06 DIAGNOSIS — E03.9 HYPOTHYROIDISM, UNSPECIFIED: ICD-10-CM

## 2025-01-06 PROCEDURE — 99213 OFFICE O/P EST LOW 20 MIN: CPT

## 2025-01-06 PROCEDURE — 99214 OFFICE O/P EST MOD 30 MIN: CPT

## 2025-01-06 PROCEDURE — G2211 COMPLEX E/M VISIT ADD ON: CPT | Mod: NC

## 2025-01-13 ENCOUNTER — APPOINTMENT (OUTPATIENT)
Dept: ORTHOPEDIC SURGERY | Facility: CLINIC | Age: 60
End: 2025-01-13
Payer: OTHER MISCELLANEOUS

## 2025-01-13 DIAGNOSIS — M65.311 TRIGGER THUMB, RIGHT THUMB: ICD-10-CM

## 2025-01-13 DIAGNOSIS — S52.571D OTHER INTRAARTICULAR FRACTURE OF LOWER END OF RIGHT RADIUS, SUBSEQUENT ENCOUNTER FOR CLOSED FRACTURE WITH ROUTINE HEALING: ICD-10-CM

## 2025-01-13 DIAGNOSIS — Z96.9 PRESENCE OF FUNCTIONAL IMPLANT, UNSPECIFIED: ICD-10-CM

## 2025-01-13 DIAGNOSIS — G56.01 CARPAL TUNNEL SYNDROME, RIGHT UPPER LIMB: ICD-10-CM

## 2025-01-13 DIAGNOSIS — G56.21 LESION OF ULNAR NERVE, RIGHT UPPER LIMB: ICD-10-CM

## 2025-01-13 DIAGNOSIS — M65.341 TRIGGER FINGER, RIGHT RING FINGER: ICD-10-CM

## 2025-01-13 PROCEDURE — 99214 OFFICE O/P EST MOD 30 MIN: CPT

## 2025-01-27 ENCOUNTER — APPOINTMENT (OUTPATIENT)
Dept: INTERNAL MEDICINE | Facility: CLINIC | Age: 60
End: 2025-01-27
Payer: COMMERCIAL

## 2025-01-27 VITALS
TEMPERATURE: 97.9 F | HEIGHT: 66 IN | BODY MASS INDEX: 40.82 KG/M2 | WEIGHT: 254 LBS | OXYGEN SATURATION: 98 % | HEART RATE: 96 BPM

## 2025-01-27 VITALS — DIASTOLIC BLOOD PRESSURE: 86 MMHG | SYSTOLIC BLOOD PRESSURE: 122 MMHG

## 2025-01-27 DIAGNOSIS — H66.90 OTITIS MEDIA, UNSPECIFIED, UNSPECIFIED EAR: ICD-10-CM

## 2025-01-27 DIAGNOSIS — R91.8 OTHER NONSPECIFIC ABNORMAL FINDING OF LUNG FIELD: ICD-10-CM

## 2025-01-27 DIAGNOSIS — J40 BRONCHITIS, NOT SPECIFIED AS ACUTE OR CHRONIC: ICD-10-CM

## 2025-01-27 DIAGNOSIS — F17.210 NICOTINE DEPENDENCE, CIGARETTES, UNCOMPLICATED: ICD-10-CM

## 2025-01-27 PROCEDURE — 99213 OFFICE O/P EST LOW 20 MIN: CPT

## 2025-01-27 PROCEDURE — G2211 COMPLEX E/M VISIT ADD ON: CPT | Mod: NC

## 2025-01-27 RX ORDER — PREDNISONE 10 MG/1
10 TABLET ORAL
Qty: 12 | Refills: 0 | Status: ACTIVE | COMMUNITY
Start: 2025-01-27 | End: 1900-01-01

## 2025-01-27 RX ORDER — CEFPODOXIME PROXETIL 200 MG/1
200 TABLET, FILM COATED ORAL
Qty: 14 | Refills: 0 | Status: ACTIVE | COMMUNITY
Start: 2025-01-27 | End: 1900-01-01

## 2025-01-27 RX ORDER — GUAIFENESIN AND DEXTROMETHORPHAN HYDROBROMIDE 1200; 60 MG/1; MG/1
60-1200 TABLET, EXTENDED RELEASE ORAL
Qty: 14 | Refills: 0 | Status: ACTIVE | COMMUNITY
Start: 2025-01-27 | End: 1900-01-01

## 2025-02-03 ENCOUNTER — APPOINTMENT (OUTPATIENT)
Dept: INTERNAL MEDICINE | Facility: CLINIC | Age: 60
End: 2025-02-03
Payer: COMMERCIAL

## 2025-02-03 VITALS
BODY MASS INDEX: 40.82 KG/M2 | OXYGEN SATURATION: 96 % | HEIGHT: 66 IN | TEMPERATURE: 97.6 F | HEART RATE: 83 BPM | WEIGHT: 254 LBS

## 2025-02-03 VITALS — SYSTOLIC BLOOD PRESSURE: 120 MMHG | DIASTOLIC BLOOD PRESSURE: 82 MMHG

## 2025-02-03 PROCEDURE — G2211 COMPLEX E/M VISIT ADD ON: CPT | Mod: NC

## 2025-02-03 PROCEDURE — 99213 OFFICE O/P EST LOW 20 MIN: CPT

## 2025-02-03 RX ORDER — CIPROFLOXACIN HYDROCHLORIDE AND HYDROCORTISONE 2; 10 MG/ML; MG/ML
0.2-1 SUSPENSION/ DROPS AURICULAR (OTIC) TWICE DAILY
Qty: 1 | Refills: 1 | Status: ACTIVE | COMMUNITY
Start: 2025-02-03 | End: 1900-01-01

## 2025-02-04 ENCOUNTER — APPOINTMENT (OUTPATIENT)
Dept: ORTHOPEDIC SURGERY | Facility: CLINIC | Age: 60
End: 2025-02-04

## 2025-02-04 DIAGNOSIS — H60.509 UNSPECIFIED ACUTE NONINFECTIVE OTITIS EXTERNA, UNSPECIFIED EAR: ICD-10-CM

## 2025-02-04 DIAGNOSIS — M25.569 PAIN IN UNSPECIFIED KNEE: ICD-10-CM

## 2025-02-04 DIAGNOSIS — M25.562 PAIN IN LEFT KNEE: ICD-10-CM

## 2025-02-04 DIAGNOSIS — M25.561 PAIN IN RIGHT KNEE: ICD-10-CM

## 2025-02-04 PROCEDURE — 20610 DRAIN/INJ JOINT/BURSA W/O US: CPT | Mod: 50

## 2025-02-04 PROCEDURE — 99214 OFFICE O/P EST MOD 30 MIN: CPT | Mod: 25

## 2025-02-04 PROCEDURE — 73564 X-RAY EXAM KNEE 4 OR MORE: CPT | Mod: 50

## 2025-02-04 PROCEDURE — 72170 X-RAY EXAM OF PELVIS: CPT

## 2025-02-04 RX ORDER — METHYLPRED ACET/NACL,ISO-OS/PF 40 MG/ML
40 VIAL (ML) INJECTION
Refills: 0 | Status: COMPLETED | OUTPATIENT
Start: 2025-02-04

## 2025-02-04 RX ORDER — LIDOCAINE HYDROCHLORIDE 10 MG/ML
1 INJECTION, SOLUTION INFILTRATION; PERINEURAL
Refills: 0 | Status: COMPLETED | OUTPATIENT
Start: 2025-02-04

## 2025-02-04 RX ORDER — NEOMYCIN SULFATE, POLYMYXIN B SULFATE, HYDROCORTISONE 3.5; 10000; 1 MG/ML; [USP'U]/ML; MG/ML
1 SOLUTION/ DROPS AURICULAR (OTIC) 4 TIMES DAILY
Qty: 9 | Refills: 0 | Status: ACTIVE | COMMUNITY
Start: 2025-02-04 | End: 1900-01-01

## 2025-02-04 RX ORDER — MELOXICAM 15 MG/1
15 TABLET ORAL
Qty: 30 | Refills: 2 | Status: ACTIVE | COMMUNITY
Start: 2025-02-04 | End: 1900-01-01

## 2025-02-04 RX ADMIN — LIDOCAINE HYDROCHLORIDE %: 10 INJECTION, SOLUTION INFILTRATION; PERINEURAL at 00:00

## 2025-02-04 RX ADMIN — METHYLPREDNISOLONE ACETATE MG/ML: 40 INJECTION, SUSPENSION INTRA-ARTICULAR; INTRALESIONAL; INTRAMUSCULAR; SOFT TISSUE at 00:00

## 2025-02-27 ENCOUNTER — APPOINTMENT (OUTPATIENT)
Dept: INTERNAL MEDICINE | Facility: CLINIC | Age: 60
End: 2025-02-27
Payer: COMMERCIAL

## 2025-02-27 DIAGNOSIS — M19.90 UNSPECIFIED OSTEOARTHRITIS, UNSPECIFIED SITE: ICD-10-CM

## 2025-02-27 DIAGNOSIS — G62.9 POLYNEUROPATHY, UNSPECIFIED: ICD-10-CM

## 2025-02-27 PROCEDURE — G2211 COMPLEX E/M VISIT ADD ON: CPT | Mod: NC,95

## 2025-02-27 PROCEDURE — 99213 OFFICE O/P EST LOW 20 MIN: CPT | Mod: 95

## 2025-03-31 ENCOUNTER — APPOINTMENT (OUTPATIENT)
Dept: ORTHOPEDIC SURGERY | Facility: CLINIC | Age: 60
End: 2025-03-31
Payer: OTHER MISCELLANEOUS

## 2025-03-31 DIAGNOSIS — G56.21 LESION OF ULNAR NERVE, RIGHT UPPER LIMB: ICD-10-CM

## 2025-03-31 DIAGNOSIS — M65.311 TRIGGER THUMB, RIGHT THUMB: ICD-10-CM

## 2025-03-31 DIAGNOSIS — G56.01 CARPAL TUNNEL SYNDROME, RIGHT UPPER LIMB: ICD-10-CM

## 2025-03-31 DIAGNOSIS — S52.571D OTHER INTRAARTICULAR FRACTURE OF LOWER END OF RIGHT RADIUS, SUBSEQUENT ENCOUNTER FOR CLOSED FRACTURE WITH ROUTINE HEALING: ICD-10-CM

## 2025-03-31 DIAGNOSIS — M65.341 TRIGGER FINGER, RIGHT RING FINGER: ICD-10-CM

## 2025-03-31 PROCEDURE — 99214 OFFICE O/P EST MOD 30 MIN: CPT

## 2025-04-02 ENCOUNTER — RX RENEWAL (OUTPATIENT)
Age: 60
End: 2025-04-02

## 2025-04-03 ENCOUNTER — NON-APPOINTMENT (OUTPATIENT)
Age: 60
End: 2025-04-03

## 2025-04-07 ENCOUNTER — APPOINTMENT (OUTPATIENT)
Dept: INTERNAL MEDICINE | Facility: CLINIC | Age: 60
End: 2025-04-07

## 2025-04-07 ENCOUNTER — INPATIENT (INPATIENT)
Facility: HOSPITAL | Age: 60
LOS: 1 days | Discharge: HOME | DRG: 392 | End: 2025-04-09
Attending: STUDENT IN AN ORGANIZED HEALTH CARE EDUCATION/TRAINING PROGRAM | Admitting: STUDENT IN AN ORGANIZED HEALTH CARE EDUCATION/TRAINING PROGRAM
Payer: COMMERCIAL

## 2025-04-07 ENCOUNTER — NON-APPOINTMENT (OUTPATIENT)
Age: 60
End: 2025-04-07

## 2025-04-07 ENCOUNTER — APPOINTMENT (OUTPATIENT)
Dept: BARIATRICS | Facility: CLINIC | Age: 60
End: 2025-04-07

## 2025-04-07 VITALS
OXYGEN SATURATION: 98 % | RESPIRATION RATE: 20 BRPM | WEIGHT: 240.08 LBS | HEART RATE: 93 BPM | SYSTOLIC BLOOD PRESSURE: 115 MMHG | DIASTOLIC BLOOD PRESSURE: 74 MMHG | HEIGHT: 66 IN | TEMPERATURE: 97 F

## 2025-04-07 VITALS
SYSTOLIC BLOOD PRESSURE: 144 MMHG | HEART RATE: 68 BPM | WEIGHT: 240.08 LBS | HEIGHT: 66 IN | OXYGEN SATURATION: 100 % | DIASTOLIC BLOOD PRESSURE: 77 MMHG | BODY MASS INDEX: 38.58 KG/M2 | TEMPERATURE: 97 F

## 2025-04-07 DIAGNOSIS — Z98.890 OTHER SPECIFIED POSTPROCEDURAL STATES: Chronic | ICD-10-CM

## 2025-04-07 DIAGNOSIS — Z79.899 OTHER LONG TERM (CURRENT) DRUG THERAPY: ICD-10-CM

## 2025-04-07 DIAGNOSIS — Z90.89 ACQUIRED ABSENCE OF OTHER ORGANS: Chronic | ICD-10-CM

## 2025-04-07 DIAGNOSIS — Z76.89 PERSONS ENCOUNTERING HEALTH SERVICES IN OTHER SPECIFIED CIRCUMSTANCES: ICD-10-CM

## 2025-04-07 DIAGNOSIS — Z76.0 ENCOUNTER FOR ISSUE OF REPEAT PRESCRIPTION: ICD-10-CM

## 2025-04-07 DIAGNOSIS — R63.4 ABNORMAL WEIGHT LOSS: ICD-10-CM

## 2025-04-07 DIAGNOSIS — Z98.84 BARIATRIC SURGERY STATUS: ICD-10-CM

## 2025-04-07 DIAGNOSIS — Z90.3 ACQUIRED ABSENCE OF STOMACH [PART OF]: Chronic | ICD-10-CM

## 2025-04-07 DIAGNOSIS — Z96.641 PRESENCE OF RIGHT ARTIFICIAL HIP JOINT: Chronic | ICD-10-CM

## 2025-04-07 LAB
ALBUMIN SERPL ELPH-MCNC: 3.9 G/DL — SIGNIFICANT CHANGE UP (ref 3.3–5)
ALP SERPL-CCNC: 86 U/L — SIGNIFICANT CHANGE UP (ref 30–120)
ALT FLD-CCNC: 19 U/L — SIGNIFICANT CHANGE UP (ref 10–60)
ANION GAP SERPL CALC-SCNC: 10 MMOL/L — SIGNIFICANT CHANGE UP (ref 5–17)
APPEARANCE UR: ABNORMAL
AST SERPL-CCNC: 13 U/L — SIGNIFICANT CHANGE UP (ref 10–40)
BASOPHILS # BLD AUTO: 0.04 K/UL — SIGNIFICANT CHANGE UP (ref 0–0.2)
BASOPHILS NFR BLD AUTO: 0.4 % — SIGNIFICANT CHANGE UP (ref 0–2)
BILIRUB SERPL-MCNC: 0.6 MG/DL — SIGNIFICANT CHANGE UP (ref 0.2–1.2)
BILIRUB UR-MCNC: ABNORMAL
BUN SERPL-MCNC: 17 MG/DL — SIGNIFICANT CHANGE UP (ref 7–23)
CALCIUM SERPL-MCNC: 10 MG/DL — SIGNIFICANT CHANGE UP (ref 8.4–10.5)
CHLORIDE SERPL-SCNC: 105 MMOL/L — SIGNIFICANT CHANGE UP (ref 96–108)
CO2 SERPL-SCNC: 27 MMOL/L — SIGNIFICANT CHANGE UP (ref 22–31)
COLOR SPEC: SIGNIFICANT CHANGE UP
CREAT SERPL-MCNC: 0.83 MG/DL — SIGNIFICANT CHANGE UP (ref 0.5–1.3)
DIFF PNL FLD: NEGATIVE — SIGNIFICANT CHANGE UP
EGFR: 80 ML/MIN/1.73M2 — SIGNIFICANT CHANGE UP
EGFR: 80 ML/MIN/1.73M2 — SIGNIFICANT CHANGE UP
EOSINOPHIL # BLD AUTO: 0.06 K/UL — SIGNIFICANT CHANGE UP (ref 0–0.5)
EOSINOPHIL NFR BLD AUTO: 0.7 % — SIGNIFICANT CHANGE UP (ref 0–6)
GLUCOSE SERPL-MCNC: 99 MG/DL — SIGNIFICANT CHANGE UP (ref 70–99)
GLUCOSE UR QL: 100 MG/DL
HCG SERPL-ACNC: 4 MIU/ML — SIGNIFICANT CHANGE UP
HCT VFR BLD CALC: 43.7 % — SIGNIFICANT CHANGE UP (ref 34.5–45)
HGB BLD-MCNC: 15.1 G/DL — SIGNIFICANT CHANGE UP (ref 11.5–15.5)
IMM GRANULOCYTES NFR BLD AUTO: 0.3 % — SIGNIFICANT CHANGE UP (ref 0–0.9)
KETONES UR-MCNC: 15 MG/DL
LACTATE SERPL-SCNC: 1.7 MMOL/L — SIGNIFICANT CHANGE UP (ref 0.7–2)
LEUKOCYTE ESTERASE UR-ACNC: ABNORMAL
LIDOCAIN IGE QN: 14 U/L — LOW (ref 16–77)
LYMPHOCYTES # BLD AUTO: 1.61 K/UL — SIGNIFICANT CHANGE UP (ref 1–3.3)
LYMPHOCYTES # BLD AUTO: 17.8 % — SIGNIFICANT CHANGE UP (ref 13–44)
MCHC RBC-ENTMCNC: 30.2 PG — SIGNIFICANT CHANGE UP (ref 27–34)
MCHC RBC-ENTMCNC: 34.6 G/DL — SIGNIFICANT CHANGE UP (ref 32–36)
MCV RBC AUTO: 87.4 FL — SIGNIFICANT CHANGE UP (ref 80–100)
MONOCYTES # BLD AUTO: 0.67 K/UL — SIGNIFICANT CHANGE UP (ref 0–0.9)
MONOCYTES NFR BLD AUTO: 7.4 % — SIGNIFICANT CHANGE UP (ref 2–14)
NEUTROPHILS # BLD AUTO: 6.61 K/UL — SIGNIFICANT CHANGE UP (ref 1.8–7.4)
NEUTROPHILS NFR BLD AUTO: 73.4 % — SIGNIFICANT CHANGE UP (ref 43–77)
NITRITE UR-MCNC: NEGATIVE — SIGNIFICANT CHANGE UP
NRBC BLD AUTO-RTO: 0 /100 WBCS — SIGNIFICANT CHANGE UP (ref 0–0)
PH UR: 5 — SIGNIFICANT CHANGE UP (ref 5–8)
PLATELET # BLD AUTO: 323 K/UL — SIGNIFICANT CHANGE UP (ref 150–400)
POTASSIUM SERPL-MCNC: 3.8 MMOL/L — SIGNIFICANT CHANGE UP (ref 3.5–5.3)
POTASSIUM SERPL-SCNC: 3.8 MMOL/L — SIGNIFICANT CHANGE UP (ref 3.5–5.3)
PROT SERPL-MCNC: 7.1 G/DL — SIGNIFICANT CHANGE UP (ref 6–8.3)
PROT UR-MCNC: 100 MG/DL
RBC # BLD: 5 M/UL — SIGNIFICANT CHANGE UP (ref 3.8–5.2)
RBC # FLD: 12.8 % — SIGNIFICANT CHANGE UP (ref 10.3–14.5)
SODIUM SERPL-SCNC: 142 MMOL/L — SIGNIFICANT CHANGE UP (ref 135–145)
SP GR SPEC: 1.05 — HIGH (ref 1–1.03)
UROBILINOGEN FLD QL: 1 MG/DL — SIGNIFICANT CHANGE UP (ref 0.2–1)
WBC # BLD: 9.02 K/UL — SIGNIFICANT CHANGE UP (ref 3.8–10.5)
WBC # FLD AUTO: 9.02 K/UL — SIGNIFICANT CHANGE UP (ref 3.8–10.5)

## 2025-04-07 PROCEDURE — 76700 US EXAM ABDOM COMPLETE: CPT | Mod: 26

## 2025-04-07 PROCEDURE — 99285 EMERGENCY DEPT VISIT HI MDM: CPT

## 2025-04-07 PROCEDURE — 74177 CT ABD & PELVIS W/CONTRAST: CPT | Mod: 26

## 2025-04-07 PROCEDURE — 93010 ELECTROCARDIOGRAM REPORT: CPT

## 2025-04-07 PROCEDURE — 99223 1ST HOSP IP/OBS HIGH 75: CPT

## 2025-04-07 PROCEDURE — 99284 EMERGENCY DEPT VISIT MOD MDM: CPT

## 2025-04-07 RX ORDER — NORTRIPTYLINE HCL 75 MG
20 CAPSULE ORAL AT BEDTIME
Refills: 0 | Status: DISCONTINUED | OUTPATIENT
Start: 2025-04-07 | End: 2025-04-09

## 2025-04-07 RX ORDER — IOHEXOL 350 MG/ML
30 INJECTION, SOLUTION INTRAVENOUS ONCE
Refills: 0 | Status: COMPLETED | OUTPATIENT
Start: 2025-04-07 | End: 2025-04-07

## 2025-04-07 RX ORDER — ONDANSETRON HCL/PF 4 MG/2 ML
4 VIAL (ML) INJECTION ONCE
Refills: 0 | Status: COMPLETED | OUTPATIENT
Start: 2025-04-07 | End: 2025-04-07

## 2025-04-07 RX ORDER — ACETAMINOPHEN 500 MG/5ML
1000 LIQUID (ML) ORAL EVERY 6 HOURS
Refills: 0 | Status: COMPLETED | OUTPATIENT
Start: 2025-04-07 | End: 2025-04-08

## 2025-04-07 RX ORDER — LEVOTHYROXINE SODIUM 300 MCG
200 TABLET ORAL
Refills: 0 | Status: DISCONTINUED | OUTPATIENT
Start: 2025-04-08 | End: 2025-04-09

## 2025-04-07 RX ORDER — ENOXAPARIN SODIUM 100 MG/ML
40 INJECTION SUBCUTANEOUS EVERY 12 HOURS
Refills: 0 | Status: DISCONTINUED | OUTPATIENT
Start: 2025-04-07 | End: 2025-04-09

## 2025-04-07 RX ORDER — TIZANIDINE 4 MG/1
2 TABLET ORAL EVERY 6 HOURS
Refills: 0 | Status: DISCONTINUED | OUTPATIENT
Start: 2025-04-07 | End: 2025-04-08

## 2025-04-07 RX ORDER — BISACODYL 5 MG
10 TABLET, DELAYED RELEASE (ENTERIC COATED) ORAL ONCE
Refills: 0 | Status: COMPLETED | OUTPATIENT
Start: 2025-04-07 | End: 2025-04-07

## 2025-04-07 RX ORDER — ONDANSETRON HCL/PF 4 MG/2 ML
4 VIAL (ML) INJECTION EVERY 6 HOURS
Refills: 0 | Status: DISCONTINUED | OUTPATIENT
Start: 2025-04-07 | End: 2025-04-09

## 2025-04-07 RX ORDER — NICOTINE POLACRILEX 4 MG/1
1 GUM, CHEWING ORAL DAILY
Refills: 0 | Status: DISCONTINUED | OUTPATIENT
Start: 2025-04-07 | End: 2025-04-09

## 2025-04-07 RX ORDER — LEVOTHYROXINE SODIUM 300 MCG
175 TABLET ORAL
Refills: 0 | Status: DISCONTINUED | OUTPATIENT
Start: 2025-04-07 | End: 2025-04-09

## 2025-04-07 RX ORDER — LOSARTAN POTASSIUM 100 MG/1
50 TABLET, FILM COATED ORAL AT BEDTIME
Refills: 0 | Status: DISCONTINUED | OUTPATIENT
Start: 2025-04-07 | End: 2025-04-09

## 2025-04-07 RX ORDER — AMLODIPINE BESYLATE 10 MG/1
5 TABLET ORAL DAILY
Refills: 0 | Status: DISCONTINUED | OUTPATIENT
Start: 2025-04-07 | End: 2025-04-09

## 2025-04-07 RX ORDER — SODIUM CHLORIDE 9 G/1000ML
1000 INJECTION, SOLUTION INTRAVENOUS
Refills: 0 | Status: DISCONTINUED | OUTPATIENT
Start: 2025-04-07 | End: 2025-04-09

## 2025-04-07 RX ORDER — SODIUM CHLORIDE 9 G/1000ML
1000 INJECTION, SOLUTION INTRAVENOUS
Refills: 0 | Status: COMPLETED | OUTPATIENT
Start: 2025-04-07 | End: 2025-04-07

## 2025-04-07 RX ORDER — KETOROLAC TROMETHAMINE 30 MG/ML
15 INJECTION, SOLUTION INTRAMUSCULAR; INTRAVENOUS EVERY 6 HOURS
Refills: 0 | Status: DISCONTINUED | OUTPATIENT
Start: 2025-04-07 | End: 2025-04-09

## 2025-04-07 RX ORDER — POLYETHYLENE GLYCOL 3350 17 G/17G
17 POWDER, FOR SOLUTION ORAL
Refills: 0 | Status: DISCONTINUED | OUTPATIENT
Start: 2025-04-07 | End: 2025-04-09

## 2025-04-07 RX ORDER — FOLIC ACID 1 MG/1
1 TABLET ORAL ONCE
Refills: 0 | Status: COMPLETED | OUTPATIENT
Start: 2025-04-07 | End: 2025-04-07

## 2025-04-07 RX ORDER — NICOTINE POLACRILEX 4 MG/1
1 GUM, CHEWING ORAL DAILY
Refills: 0 | Status: DISCONTINUED | OUTPATIENT
Start: 2025-04-07 | End: 2025-04-07

## 2025-04-07 RX ORDER — ALPRAZOLAM 0.5 MG
0.5 TABLET, EXTENDED RELEASE 24 HR ORAL EVERY 8 HOURS
Refills: 0 | Status: DISCONTINUED | OUTPATIENT
Start: 2025-04-07 | End: 2025-04-09

## 2025-04-07 RX ADMIN — Medication 4 MILLIGRAM(S): at 10:40

## 2025-04-07 RX ADMIN — Medication 4 MILLIGRAM(S): at 12:30

## 2025-04-07 RX ADMIN — Medication 400 MILLIGRAM(S): at 23:01

## 2025-04-07 RX ADMIN — Medication 10 MILLIGRAM(S): at 21:03

## 2025-04-07 RX ADMIN — Medication 20 MILLIGRAM(S): at 21:04

## 2025-04-07 RX ADMIN — FOLIC ACID 1 MILLIGRAM(S): 1 TABLET ORAL at 11:27

## 2025-04-07 RX ADMIN — SODIUM CHLORIDE 100 MILLILITER(S): 9 INJECTION, SOLUTION INTRAVENOUS at 11:26

## 2025-04-07 RX ADMIN — Medication 1000 MILLIGRAM(S): at 23:08

## 2025-04-07 RX ADMIN — Medication 4 MILLIGRAM(S): at 12:38

## 2025-04-07 RX ADMIN — ENOXAPARIN SODIUM 40 MILLIGRAM(S): 100 INJECTION SUBCUTANEOUS at 17:54

## 2025-04-07 RX ADMIN — SODIUM CHLORIDE 100 MILLILITER(S): 9 INJECTION, SOLUTION INTRAVENOUS at 19:59

## 2025-04-07 RX ADMIN — SODIUM CHLORIDE 1000 MILLILITER(S): 9 INJECTION, SOLUTION INTRAVENOUS at 13:59

## 2025-04-07 RX ADMIN — Medication 0.5 MILLIGRAM(S): at 20:03

## 2025-04-07 RX ADMIN — Medication 1000 MILLILITER(S): at 11:30

## 2025-04-07 RX ADMIN — KETOROLAC TROMETHAMINE 15 MILLIGRAM(S): 30 INJECTION, SOLUTION INTRAMUSCULAR; INTRAVENOUS at 16:37

## 2025-04-07 RX ADMIN — Medication 100 MILLIGRAM(S): at 11:25

## 2025-04-07 RX ADMIN — Medication 4 MILLIGRAM(S): at 11:30

## 2025-04-07 RX ADMIN — SODIUM CHLORIDE 100 MILLILITER(S): 9 INJECTION, SOLUTION INTRAVENOUS at 17:54

## 2025-04-07 RX ADMIN — Medication 1000 MILLILITER(S): at 11:38

## 2025-04-07 RX ADMIN — Medication 1000 MILLILITER(S): at 10:39

## 2025-04-07 RX ADMIN — IOHEXOL 30 MILLILITER(S): 350 INJECTION, SOLUTION INTRAVENOUS at 10:53

## 2025-04-07 RX ADMIN — Medication 400 MILLIGRAM(S): at 17:54

## 2025-04-07 RX ADMIN — POLYETHYLENE GLYCOL 3350 17 GRAM(S): 17 POWDER, FOR SOLUTION ORAL at 17:54

## 2025-04-07 RX ADMIN — NICOTINE POLACRILEX 1 PATCH: 4 GUM, CHEWING ORAL at 21:02

## 2025-04-07 RX ADMIN — LOSARTAN POTASSIUM 50 MILLIGRAM(S): 100 TABLET, FILM COATED ORAL at 21:04

## 2025-04-07 RX ADMIN — Medication 175 MICROGRAM(S): at 21:04

## 2025-04-07 NOTE — ED PROVIDER NOTE - PROGRESS NOTE DETAILS
Discussed with TIN Beth, he states the patient should get labs, CT with IV and p.o. contrast, they will follow. Patient seen in the ED by Dr. Rivera.  She states we should admit the patient to the hospital, for pain control.  Also for GI evaluation.  Admit to medicine. Discussed with hospitalist, Dr. Perera, will see patient to admit.

## 2025-04-07 NOTE — ED ADULT TRIAGE NOTE - CHIEF COMPLAINT QUOTE
I have left lower stomach pain since Thursday. I been to [facility] on Friday and Saturday and they saw nothing

## 2025-04-07 NOTE — ED ADULT NURSE NOTE - CHIEF COMPLAINT QUOTE
I have left lower stomach pain since Thursday. I been to [facility] on Friday and Saturday and they saw nothing

## 2025-04-07 NOTE — CONSULT NOTE ADULT - TIME BILLING
Agree with above.  Patient seen and examined. CT scan no clear etiology of pain (distended gallbladder not likely cause of pain), Labs normal. Patient has taken morphine in ER, will admit to medicine, GI consult. May have clears. Will follow.

## 2025-04-07 NOTE — PATIENT PROFILE ADULT - SAFE PLACE TO LIVE
Problem: Mechanical Ventilation, Invasive (Adult)  Goal: Signs and Symptoms of Listed Potential Problems Will be Absent or Manageable (Mechanical Ventilation, Invasive)  Outcome: Ongoing (interventions implemented as appropriate)    09/19/17 0546   Mechanical Ventilation, Invasive   Problems Assessed (Mechanical Ventilation, Invasive) all   Problems Present (Mechanical Ventilation, Invasive) inability to wean      Pt was extubated and coded several hours later. She is reintubated and on ventilator at this time       no

## 2025-04-07 NOTE — CONSULT NOTE ADULT - ASSESSMENT
LLQ abdominal tenderness  Distended GB    Recommendations:  - CT with moderate colonic stool burden  - Begin Miralax 17g BID and Dulcolax suppository daily  - f/u Surgery recs, can consider HIDA/HIDA CCK as outpt  - Pain control  - IVF    NOTE INCOMPLETE LLQ abdominal tenderness  Distended GB    Recommendations:  - CT with moderate colonic stool burden  - Begin Miralax 17g BID and Dulcolax suppository daily  - f/u Surgery recs, can consider HIDA/HIDA CCK as outpt  - Pain control  - IVF  - ADAT  - Will follow with you    Dustin Cooper M.D.  [facility]  [phone]    Advanced care planning was discussed with patient and family.  Advanced care planning forms were reviewed and discussed.  Risks, benefits and alternatives of gastroenterologic procedures were discussed in detail and all questions were answered  60 minutes spent on total encounter of which more than fifty percent of the encounter was spent counseling and/or coordinating care by the attending physician.

## 2025-04-07 NOTE — ED PROVIDER NOTE - CLINICAL SUMMARY MEDICAL DECISION MAKING FREE TEXT BOX
Acute abdominal pain with a history of gastric sleeve.  Tender in the left lower quadrant.  No acute findings on recent outside hospital CT.  Will check labs, repeat CT, IV fluids, reeval

## 2025-04-07 NOTE — H&P ADULT - NSHPLABSRESULTS_GEN_ALL_CORE
Labs:                          15.1   9.02  )-----------( 323      ( 2025 11:07 )             43.7     -    142  |  105  |  17  ----------------------------<  99  3.8   |  27  |  0.83    Ca    10.0      2025 11:07    TPro  7.1  /  Alb  3.9  /  TBili  0.6  /  DBili  x   /  AST  13  /  ALT  19  /  AlkPhos  86  04-    Urinalysis Basic - ( 2025 11:07 )  Color: Dark Yellow / Appearance: Cloudy / S.047 / pH: x  Gluc: 99 mg/dL / Ketone: 15 mg/dL  / Bili: Moderate / Urobili: 1.0 mg/dL   Blood: x / Protein: 100 mg/dL / Nitrite: Negative   Leuk Esterase: Trace / RBC: 1 /HPF / WBC 1 /HPF   Sq Epi: x / Non Sq Epi: x / Bacteria: Few /HPF    Lactate Trend   @ 11:07 Lactate:1.7     CAPILLARY BLOOD GLUCOSE    EKG:   Personally Reviewed:  [ ] YES     Imaging:   < from: CT Abdomen and Pelvis w/ Oral Cont and w/ IV Cont (25 @ 13:23) >  FINDINGS:  LOWER CHEST: Within normal limits.  LIVER: Within normal limits.  BILE DUCTS: Normal caliber.  GALLBLADDER: Markedly distended. Correlate with symptomatology. If there is concern for cholecystitis or quadrant ultrasound recommended.  SPLEEN: Within normal limits.  PANCREAS: Within normal limits.  ADRENALS: Within normal limits.  KIDNEYS/URETERS: Indeterminate 1.2 cm hypodense cortical lesion left kidney measuring higher than simple fluid. Correlate with ultrasound  BLADDER: Within normal limits.  REPRODUCTIVE ORGANS: Unremarkable  BOWEL: Gastric sleeve. No bowel obstruction. Appendix normal  PERITONEUM/RETROPERITONEUM: Within normal limits.  VESSELS: Nonaneurysmal.  LYMPH NODES: No lymphadenopathy.  ABDOMINAL WALL: Small fat-containing umbilical hernia.  BONES: Right THR. Degenerative changes    IMPRESSION:  Markedly distended gallbladder. Correlate with symptomatology right upper quadrant ultrasound as clinically indicated.    Indeterminate cortical hypodense lesion left kidney. Correlate with renal ultrasound  < end of copied text >  Personally Reviewed:  [ x] YES

## 2025-04-07 NOTE — ED PROVIDER NOTE - DIFFERENTIAL DIAGNOSIS
Rule out acute infection, electrolyte abnormality, leukocytosis, pancreatitis, other acute pathology Differential Diagnosis

## 2025-04-07 NOTE — ED PROVIDER NOTE - OBJECTIVE STATEMENT
59-year-old female with a history of hypertension, hypothyroidism, status post gastric sleeve in 2017 presents with has been having some abdominal pain, associate with some nausea and dry heaves over the past 5 days.  No acute fever or chills.  No acute diarrhea.  No recent trauma.  Patient was seen at [facility], with no acute findings.  The patient presented to see Dr. Rivera today, who sent the patient for a repeat CT with IV and p.o. contrast.  The patient states that her pain has been persistent, possibly feeling worse today.  No known trauma.  No known sick contacts.  No aggravating or alleviating factors otherwise noted.  No other acute complaints.

## 2025-04-07 NOTE — CONSULT NOTE ADULT - SUBJECTIVE AND OBJECTIVE BOX
SIMIN DIA 59y  MRN-[id number]      THIS IS A 60 YO OBESE, SMOKER FEMALE WITH SIGNIFICANT PAST MEDICAL HISTORY OF HTN, HYPOTHYROIDISM, BILAT LOWER EXT NEUROPATHY, ANXIETY, OA AND PERTINENT PAST SURGICAL HISTORY OF LAPAROSCOPIC GASTRIC SLEEVE IN 2017 ( LOST NEARLY 110 IBS) CURRENTLY ON ZEPBOUND HAD ROUTINE FOLLOW UP WITH DR. VERNON OFFICE THIS MORNING, COMPLAINING OF LEFT LOWER ABDOMINAL PAIN SINCE THIS PAST FRIDAY 04/04/2025.    SHE TRACEY ANY FEVER, CHILLS, N/V, CHANGE IN STOOL CALIBER, HEMATEMESIS, HEMATOCHEZIA, MELENA,TRAUMA, PERSONAL OR FAMILY H/O DIVERTICULITIS OR COLON CANCER, DIARRHEA, RECENT TRAVEL, KIDNEY STONE,  HEMATURIA,  PYURIA, DYSURIA, FREQUENCY, VAGINAL DISCHARGE/ PELVIC PAIN, H/O PID.  SHE SUFFERS FROM CONSTIPATION AND HER LAST BM WAS WEDNESDAY. SHE NEVER HAD A COLONOSCOPY. SHE TOOK MIRALAX ON SATURDAY WITH NO RESULTS/ BM YET. SHE IS PASSING FLATUS, VOIDING WITHOUT DIFFICULTIES, SHE HAD DINNER 10 PM LAST NIGHT AND NOTHING SINCE.               SHE WAS SEEN AT [facility] ER ON FRIDAY WBC 6.56 BUN/ CREAT 21/0.76 AND AGAIN YESTERDAY. SHE HAD CT OF THE ABDOMEN AND PELVIS WITH IV CONTRAST ON BOTH ER VISITS AND ABDOMINAL AND TRANSVAGINAL SONOGRAM AS WELL. SHE HAD NORMAL CBC AND CHEMISTRY. BOTH CT AND US ALTHOUGH LIMITED STUDY DID NOT SHOW ANY SIGNIFICANT INTRAABDOMINAL / PELVIC PATHOLOGY. HER URINALYSIS WAS SUGGESTIVE FOR UTI,  CT AND US SHOWED GALLSTONES WITHOUT ANY CHOLECYSTITIS, FATTY LIVER,  FAT CONTAINING UMBILICAL AND BILAT INGUINAL HERNIAS AND MILD COLONIC STOOL.     SHE WAS SENT HOME WITH DX OF UTI ON VANTIN.           PAST MEDICAL & SURGICAL HISTORY:  Osteoarthritis   Lumbar disc herniation L4-5, treated with epidural injections with Dr. Stephenson. Last done in 2016  Goiter   Hypothyroid  Neuropathy bilateral lower extremities  Morbid obesity   Cervical disc herniation   Tendon laceration right lower leg 25 years ago  Anxiety   Mononucleosis age 10  Hypertension   Closed fracture of RIGHT  radius S/P ORIF  S/P laminectomy T11-12, 2010  S/P T&A (status post tonsillectomy and adenoidectomy) age 19  H/O gastric sleeve NAD EGD 2017  S/P hip replacement, right     Home Medications:  Ambien 10 mg oral tablet: 1 tab(s) orally once a day (at bedtime) (07 May 2024 14:17)  amLODIPine 5 mg oral tablet: 1 tab(s) orally once a day (07 May 2024 14:17)  losartan 50 mg oral tablet: 1 tab(s) orally once a day (07 May 2024 14:14)  Lyrica 100 mg oral capsule: 1 cap(s) orally once a day (07 May 2024 14:17)  nortriptyline 10 mg oral capsule: 1 cap(s) orally once a day (at bedtime) (07 May 2024 14:17)  Synthroid 175 mcg (0.175 mg) oral tablet: 1 tab(s) orally every other day (07 May 2024 14:17)  Synthroid 200 mcg (0.2 mg) oral tablet: 1 tab(s) orally every other day (07 May 2024 14:17)  Xanax 1 mg oral tablet: 1 tab(s) orally once a day, As Needed - for anxiety (07 May 2024 14:17)  Allergies    topical corticosteroids (Rash)  chlorhexidine (Rash)    Intolerances  NONE     TOBACCO USE: 4-5 CIGARETS / DAY FOR 40 YEARS   ALCOHOL USE: DENIES  DRUG USE: DENIES      REVIEW OF SYSTEMS:  SEE PAST MEDICAL, SURGICAL HISTORY AND HPI     CONSTITUTIONAL: No weakness, fevers or chills  EYES/ENT: No visual changes;  No vertigo or throat pain   NECK: No pain or stiffness  RESPIRATORY: No cough, wheezing, hemoptysis; No shortness of breath  CARDIOVASCULAR: No chest pain or palpitations  GASTROINTESTINAL: SEE HPI  GENITOURINARY: SEE HPI   NEUROLOGICAL: No numbness or weakness  SKIN: No itching, rashes    Vital Signs (24 Hrs):  T(C): 36.3 (04-07-25 @ 09:35), Max: 36.3 (04-07-25 @ 09:35)  HR: 93 (04-07-25 @ 09:35) (93 - 93)  BP: 115/74 (04-07-25 @ 09:35) (115/74 - 115/74)  RR: 20 (04-07-25 @ 09:35) (20 - 20)  SpO2: 98% (04-07-25 @ 09:35) (98% - 98%)    PHYSICAL EXAM:    GENERAL: NAD  HEAD:  ATRAUMATIC, NORMOCEPHALIC  EYES: EOMI, PERRLA, CONJUNCTIVA AND SCLERA CLEAR   ENT: MOIST MUCOUS MEMBRANE   NECK: SUPPLE, NO JVD  CHEST/LUNG: CLEAR TO AUSCULTATION, NO W/R/R  HEART: REGULAR RATE, RHYTHM, NO MURMUR, RUBS, GALLOPS  ABDOMEN: OBESE, WELL HEALED MULTIPLE TROCAR  SITES, SMALL NON TENDER UMBILICAL HERNIA. + BS, SOFT, NOT DISTENDED, NO PALPABLE MASS, LLQ TENDERNESS WITHOUT REBOUND, NO GUARDING/ RIGIDITY. NO CVA  TENDERNESS   EXTREMITIES: LARGE TRANSVERSE RIGHT UPPER LEG, RIGHT WRIST AND RIGHT HIP SCAR. 2+ PERIPHERAL PULSES, BRISK CAPILLARY REFILL. NO CLUBBING, CYANOSIS, OR EDEMA.   BACK: OLD LUMBAR REGION POST LAMINECTOMY SCAR   NERVOUS SYSTEM: ALERT AND ORIENTED X3, CLEAR SPEECH . NO DEFICITS   MUSCULOSKELETAL : GOOD ROM ALL 4 EXTREMITIES, FULL AND EQUAL STRENGTH   SKIN: NO RASH, INTACT    ASSESSMENT AND PLAN     LLQ ABDOMINAL PAIN  CONSTIPATION   H/O LAPAROSCOPIC GASTRIC SLEEVE  2017, ON ZEPBOUND  HISTORY OF HTN, HYPOTHYROIDISM, BILAT LOWER EXT NEUROPATHY, ANXIETY, OA     NPO EXCEPT MEDICATION  ROUTINE LABS  HYDRATION  IV VITAMINS  CT OF THE ABDOMEN AND PELVIS WITH IV AND PO CONTRAST   SURGICAL TEAM WILL FOLLOW UP    PLAN DISCUSSED WITH PATIENT, PATIENT'S SISTER, DR. SANDY  SIMIN DIA 59y  MRN-[id number]      THIS IS A 60 YO OBESE, SMOKER FEMALE WITH SIGNIFICANT PAST MEDICAL HISTORY OF HTN, HYPOTHYROIDISM, BILAT LOWER EXT NEUROPATHY, ANXIETY, OA AND PERTINENT PAST SURGICAL HISTORY OF LAPAROSCOPIC GASTRIC SLEEVE IN  ( LOST NEARLY 110 IBS) CURRENTLY ON ZEPBOUND HAD ROUTINE FOLLOW UP WITH DR. VERNON OFFICE THIS MORNING, COMPLAINING OF LEFT LOWER ABDOMINAL PAIN SINCE THIS PAST 2025.    SHE TRACEY ANY FEVER, CHILLS, N/V, CHANGE IN STOOL CALIBER, HEMATEMESIS, HEMATOCHEZIA, MELENA,TRAUMA, PERSONAL OR FAMILY H/O DIVERTICULITIS OR COLON CANCER, DIARRHEA, RECENT TRAVEL, KIDNEY STONE,  HEMATURIA,  PYURIA, DYSURIA, FREQUENCY, VAGINAL DISCHARGE/ PELVIC PAIN, H/O PID.  SHE SUFFERS FROM CONSTIPATION AND HER LAST BM WAS WEDNESDAY. SHE NEVER HAD A COLONOSCOPY. SHE TOOK MIRALAX ON SATURDAY WITH NO RESULTS/ BM YET. SHE IS PASSING FLATUS, VOIDING WITHOUT DIFFICULTIES, SHE HAD DINNER 10 PM LAST NIGHT AND NOTHING SINCE.               SHE WAS SEEN AT [facility] ER ON FRIDAY WBC 6.56 BUN/ CREAT 21/0.76 AND AGAIN YESTERDAY. SHE HAD CT OF THE ABDOMEN AND PELVIS WITH IV CONTRAST ON BOTH ER VISITS AND ABDOMINAL AND TRANSVAGINAL SONOGRAM AS WELL. SHE HAD NORMAL CBC AND CHEMISTRY. BOTH CT AND US ALTHOUGH LIMITED STUDY DID NOT SHOW ANY SIGNIFICANT INTRAABDOMINAL / PELVIC PATHOLOGY. HER URINALYSIS WAS SUGGESTIVE FOR UTI,  CT AND US SHOWED GALLSTONES WITHOUT ANY CHOLECYSTITIS, FATTY LIVER,  FAT CONTAINING UMBILICAL AND BILAT INGUINAL HERNIAS AND MILD COLONIC STOOL.     SHE WAS SENT HOME WITH DX OF UTI ON VANTIN.           PAST MEDICAL & SURGICAL HISTORY:  Osteoarthritis   Lumbar disc herniation L4-5, treated with epidural injections with Dr. Stephenson. Last done in 2016  Goiter   Hypothyroid  Neuropathy bilateral lower extremities  Morbid obesity   Cervical disc herniation   Tendon laceration right lower leg 25 years ago  Anxiety   Mononucleosis age 10  Hypertension   Closed fracture of RIGHT  radius S/P ORIF  S/P laminectomy T11-12,   S/P T&A (status post tonsillectomy and adenoidectomy) age 19  H/O gastric sleeve NAD EGD 2017  S/P hip replacement, right     Home Medications:  Ambien 10 mg oral tablet: 1 tab(s) orally once a day (at bedtime) (07 May 2024 14:17)  amLODIPine 5 mg oral tablet: 1 tab(s) orally once a day (07 May 2024 14:17)  losartan 50 mg oral tablet: 1 tab(s) orally once a day (07 May 2024 14:14)  Lyrica 100 mg oral capsule: 1 cap(s) orally once a day (07 May 2024 14:17)  nortriptyline 10 mg oral capsule: 1 cap(s) orally once a day (at bedtime) (07 May 2024 14:17)  Synthroid 175 mcg (0.175 mg) oral tablet: 1 tab(s) orally every other day (07 May 2024 14:17)  Synthroid 200 mcg (0.2 mg) oral tablet: 1 tab(s) orally every other day (07 May 2024 14:17)  Xanax 1 mg oral tablet: 1 tab(s) orally once a day, As Needed - for anxiety (07 May 2024 14:17)  Allergies    topical corticosteroids (Rash)  chlorhexidine (Rash)    Intolerances  NONE     TOBACCO USE: 4-5 CIGARETS / DAY FOR 40 YEARS   ALCOHOL USE: DENIES  DRUG USE: DENIES      REVIEW OF SYSTEMS:  SEE PAST MEDICAL, SURGICAL HISTORY AND HPI     CONSTITUTIONAL: No weakness, fevers or chills  EYES/ENT: No visual changes;  No vertigo or throat pain   NECK: No pain or stiffness  RESPIRATORY: No cough, wheezing, hemoptysis; No shortness of breath  CARDIOVASCULAR: No chest pain or palpitations  GASTROINTESTINAL: SEE HPI  GENITOURINARY: SEE HPI   NEUROLOGICAL: No numbness or weakness  SKIN: No itching, rashes    Vital Signs (24 Hrs):  T(C): 36.3 (25 @ 09:35), Max: 36.3 (25 @ 09:35)  HR: 93 (25 @ 09:35) (93 - 93)  BP: 115/74 (25 @ 09:35) (115/74 - 115/74)  RR: 20 (25 @ 09:35) (20 - 20)  SpO2: 98% (25 @ 09:35) (98% - 98%)    PHYSICAL EXAM:    GENERAL: NAD  HEAD:  ATRAUMATIC, NORMOCEPHALIC  EYES: EOMI, PERRLA, CONJUNCTIVA AND SCLERA CLEAR   ENT: MOIST MUCOUS MEMBRANE   NECK: SUPPLE, NO JVD  CHEST/LUNG: CLEAR TO AUSCULTATION, NO W/R/R  HEART: REGULAR RATE, RHYTHM, NO MURMUR, RUBS, GALLOPS  ABDOMEN: OBESE, WELL HEALED MULTIPLE TROCAR  SITES, SMALL NON TENDER UMBILICAL HERNIA. + BS, SOFT, NOT DISTENDED, NO PALPABLE MASS, LLQ TENDERNESS WITHOUT REBOUND, NO GUARDING/ RIGIDITY. NO CVA  TENDERNESS   EXTREMITIES: LARGE TRANSVERSE RIGHT UPPER LEG, RIGHT WRIST AND RIGHT HIP SCAR. 2+ PERIPHERAL PULSES, BRISK CAPILLARY REFILL. NO CLUBBING, CYANOSIS, OR EDEMA.   BACK: OLD LUMBAR REGION POST LAMINECTOMY SCAR   NERVOUS SYSTEM: ALERT AND ORIENTED X3, CLEAR SPEECH . NO DEFICITS   MUSCULOSKELETAL : GOOD ROM ALL 4 EXTREMITIES, FULL AND EQUAL STRENGTH   SKIN: NO RASH, INTACT    ASSESSMENT AND PLAN     LLQ ABDOMINAL PAIN  CONSTIPATION   H/O LAPAROSCOPIC GASTRIC SLEEVE  , ON ZEPBOUND  HISTORY OF HTN, HYPOTHYROIDISM, BILAT LOWER EXT NEUROPATHY, ANXIETY, OA     NPO EXCEPT MEDICATION  ROUTINE LABS  HYDRATION  IV VITAMINS  CT OF THE ABDOMEN AND PELVIS WITH IV AND PO CONTRAST   SURGICAL TEAM WILL FOLLOW UP    PLAN DISCUSSED WITH PATIENT, PATIENT'S SISTER, DR. SANDY     -------------------------------------------------------------------------------------------------------------    BARIATRIC SURGERY FOLLOW UP 2025 14:02                        15.1   9.02  )-----------( 323      ( 2025 11:07 )             43.7     2025 11:07    142    |  105    |  17     ----------------------------<  99     3.8     |  27     |  0.83     Ca    10.0       2025 11:07    TPro  7.1    /  Alb  3.9    /  TBili  0.6    /  DBili  x      /  AST  13     /  ALT  19     /  AlkPhos  86     2025 11:07    Lactate, Blood: 1.7 mmol/L ( @ 11:07)    Lipase: 14 U/L (25 @ 11:07)    Urinalysis Basic - ( 2025 11:07 )  Color: Dark Yellow / Appearance: Cloudy / S.047 / pH: x  Gluc: 99 mg/dL / Ketone: 15 mg/dL  / Bili: Moderate / Urobili: 1.0 mg/dL   Blood: x / Protein: 100 mg/dL / Nitrite: Negative   Leuk Esterase: Trace / RBC: 1 /HPF / WBC 1 /HPF   Sq Epi: x / Non Sq Epi: x / Bacteria: Few /HPF      ACC: [id number] EXAM:  CT ABDOMEN AND PELVIS OC IC   ORDERED BY: ALEXANDER SANDY   PROCEDURE DATE:  2025      INTERPRETATION:  CLINICAL INFORMATION: Abdominal pain    COMPARISON: None.    CONTRAST/COMPLICATIONS:  IV Contrast: Omnipaque 350  90 cc administered   10 cc discarded  Oral Contrast: Omnipaque 300  .    PROCEDURE:  CT of the Abdomen and Pelvis was performed.  Sagittal and coronal reformats were performed.    FINDINGS:  LOWER CHEST: Within normal limits.    LIVER: Within normal limits.  BILE DUCTS: Normal caliber.  GALLBLADDER: Markedly distended. Correlate with symptomatology. If there   is concern for cholecystitis or quadrant ultrasound recommended.  SPLEEN: Within normal limits.  PANCREAS: Within normal limits.  ADRENALS: Within normal limits.  KIDNEYS/URETERS: Indeterminate 1.2 cm hypodense cortical lesion left   kidney measuring higher than simple fluid. Correlate with ultrasound    BLADDER: Within normal limits.  REPRODUCTIVE ORGANS: Unremarkable    BOWEL: Gastric sleeve. No bowel obstruction. Appendix normal  PERITONEUM/RETROPERITONEUM: Within normal limits.  VESSELS: Nonaneurysmal.  LYMPH NODES: No lymphadenopathy.  ABDOMINAL WALL: Small fat-containing umbilical hernia.  BONES: Right THR. Degenerative changes    IMPRESSION:  Markedly distended gallbladder. Correlate with symptomatology right upper   quadrant ultrasound as clinically indicated.    Indeterminate cortical hypodense lesion left kidney. Correlate with renal   ultrasound    MYRNA BREWER MD; Attending Radiologist  This document has been electronically signed. 2025  1:37PM      PATIENT SEEN AGAIN WITH DR. VERNON  SHE REQUIRED MORPHINE SINCE HER ER ADMISSION  LABS WITHIN NORMAL LIMITS, UA NOTED, NO CT FINDINGS EXPLAIN LLQ PAIN/ TENDERNESS    RECOMMEND    MEDICAL ADMISSION  GI CONSULT AND RECOMMENDATIONS FOR CONSTIPATION    MAY START FULL LIQUID DIET  IV TYLENOL, AVOID NARCOTICS   CONTINUE ANTIBIOTICS FOR UTI  MEDICAL MANAGEMENT AS PER PRIMARY TEAM   SURGICAL TEAM WILL CONTINUE TO FOLLOW UP    PLAN DISCUSSED WITH PATIENT, PATIENT'S SISTER AND DR. SANDY IN ER

## 2025-04-07 NOTE — H&P ADULT - ASSESSMENT
59F HTN, Obesity s/p lap sleeve gastrectomy, hypothyroid, neuropathy, OA right hip s/p THR, chronic back pain s/p laminectomy, presented with 4 days of LLQ abd pain of unclear etiology    LLQ abd pain  - d/w Dr Rivera at length, reviewed imaging results  - pt with copies of results from [facility] - no significant findings on 2 CT scans, abd and pelvic US  - will admit for further evaluation  - Recheck Abd US  - continue bowel regimen and hydration  - check urine culture  - GI eval - Dr Mondragon  - Pain medication - tylenol, Toradol prn; will try to avoid opioids as may worsen constipation  - Start PPI    Distended GB on imaging  - does not appear to be source of pain  - check US   - GI input pending    HTN  - continue Norvasc and Losartan with hold parameters    Hypothyroid  - continue alternating doses of 175 mcg & 200 mcg QHS    Anxiety  - continue nortriptyline QHS  - continue xanax prn    DVT proph  - Lovenox    d/w Dr Knox (PMD).

## 2025-04-07 NOTE — H&P ADULT - NSHPPHYSICALEXAM_GEN_ALL_CORE
PHYSICAL EXAM:  Vital Signs Last 24 Hrs  T(C): 36.8 (07 Apr 2025 15:16), Max: 36.9 (07 Apr 2025 10:31)  T(F): 98.2 (07 Apr 2025 15:16), Max: 98.5 (07 Apr 2025 10:31)  HR: 84 (07 Apr 2025 15:16) (76 - 93)  BP: 122/80 (07 Apr 2025 15:16) (115/74 - 135/79)  BP(mean): --  RR: 18 (07 Apr 2025 15:16) (18 - 20)  SpO2: 98% (07 Apr 2025 15:16) (98% - 98%)    GENERAL: NAD, well-groomed, well-developed  HEAD:  Atraumatic, Normocephalic  EYES:  conjunctiva and sclera clear  ENMT  Moist mucous membranes   NECK: Supple, No JVD   NERVOUS SYSTEM:  Alert & Oriented X3, Good concentration; Motor Strength 5/5 B/L upper and lower extremities;  CHEST/LUNG: Clear to auscultation bilaterally; No rales, rhonchi, wheezing, or rubs  HEART: Regular rate and rhythm; No murmurs, rubs, or gallops  ABDOMEN: Soft, Tender in LLQ, no rebound, no guarding, Nondistended; Bowel sounds present  EXTREMITIES:  2+ Peripheral Pulses, No clubbing, cyanosis, or edema  LYMPH: No lymphadenopathy noted  SKIN: No rashes or lesions

## 2025-04-07 NOTE — H&P ADULT - HISTORY OF PRESENT ILLNESS
59F HTN, Obesity s/p lap sleeve gastrectomy, hypothyroid, neuropathy, OA right hip s/p THR, chronic back pain s/p laminectomy, presented with 4 days of LLQ abd pain.   Pain started suddenly 4 days ago. associated with chills with no fever and nausea without vomiting.  Not related to food, but is very positional and increases with pressure.  +chronic constipation that hasnt worsened lately.  Did not eat strange new foods, no recent travel. no melena or hematochezia. First she had some increase of her left lower back pain, that does occasionally get exacerbated, but that has improved and this pain has worsened. No pain in her left hip, walking does not increase the pain but bending over does. No increase in her neuropathic pain despite stopping the Lyrica (suggested by the [facility]'s physician).      On Friday and yesterday she went to the ED at [facility].  They did CT a/p x2, US pelvic/transvaginal, CT left hip, abd US without elucidation of the pain. Labs essentially normal.  She did get antibiotics for UTI but that did not relieve the pain.  Muscle relaxers did not work either.  Only Morphine, Dilaudid and Toradol seem to have helped at all.  She normally takes her Zepbound on Saturdays and she skipped her last dose.   She went to see Dr Rivera today who sent her to the ED for evaluation.    59F HTN, Obesity s/p lap sleeve gastrectomy, hypothyroid, neuropathy, OA right hip s/p THR, chronic back pain s/p laminectomy, presented with 4 days of LLQ abd pain.   Severe stabbing pain started suddenly 4 days ago. associated with chills with no fever and nausea without vomiting.  Not related to food, but is very positional and increases with pressure.  +chronic constipation that hasnt worsened lately.  Did not eat strange new foods, no recent travel. no melena or hematochezia. First she had some increase of her left lower back pain, that does occasionally get exacerbated, but that has improved and this pain has worsened. No pain in her left hip, walking does not increase the pain but bending over does. No increase in her neuropathic pain despite stopping the Lyrica (suggested by the [facility] physician).      On Friday and yesterday she went to the ED at [facility].  They did CT a/p x2, US pelvic/transvaginal, CT left hip, abd US without elucidation of the pain. Labs essentially normal.  She did get antibiotics for UTI but that did not relieve the pain.  Muscle relaxers did not work either.  Only Morphine, Dilaudid and Toradol seem to have helped at all.  She normally takes her Zepbound on Saturdays and she skipped her last dose.   She went to see Dr Rivera today who sent her to the ED for evaluation.

## 2025-04-07 NOTE — ED ADULT NURSE NOTE - OBJECTIVE STATEMENT
left lower stomach pain since Thursday. I been to [facility] on Friday and Saturday, h/o gastric sleeve 2017/leeann left lower stomach pain since Thursday. I been to [facility] on Friday and Saturday, IV infiltration on left hand Friday, h/o gastric sleeve 2017/leeann

## 2025-04-07 NOTE — CONSULT NOTE ADULT - SUBJECTIVE AND OBJECTIVE BOX
[facility]    Edmar Olrin Parker NP    [address] [place] [address] [phone]      Chief Complaint:  Patient is a 59y old  Female who presents with a chief complaint of     HPI:  59-year-old female with a history of hypertension, hypothyroidism, status post gastric sleeve in 2017 presents with has been having some abdominal pain, associate with some nausea and dry heaves over the past 5 days.  No acute fever or chills.  No acute diarrhea.  No recent trauma.  Patient was seen at [facility], with no acute findings.  The patient presented to see Dr. Rivera today, who sent the patient for a repeat CT with IV and p.o. contrast.  The patient states that her pain has been persistent, possibly feeling worse today.  No known trauma.  No known sick contacts.  No aggravating or alleviating factors otherwise noted.  No other acute complaint    Allergies:  topical corticosteroids (Rash)  chlorohexadine (Rash)      Medications:  ketorolac   Injectable 15 milliGRAM(s) IV Push every 6 hours PRN  lactated ringers. 1000 milliLiter(s) IV Continuous <Continuous>  ondansetron Injectable 4 milliGRAM(s) IV Push every 6 hours PRN      PMHX/PSHX:  Osteoarthritis    Lumbar disc herniation    Goiter    Hypothyroid    URI (upper respiratory infection)    Neuropathy    Morbid obesity    Cervical disc herniation    Tendon laceration    Anxiety    Mononucleosis    Sedentary lifestyle    Peripheral neuropathy    Hypertension    Closed fracture of radius    S/P laminectomy    S/P T&A (status post tonsillectomy and adenoidectomy)    S/P tendon repair    H/O gastric sleeve    S/P hip replacement, right        Family history:  Family history unknown        Social History:     ROS:     General:  No wt loss, fevers, chills, night sweats, fatigue,   Eyes:  Good vision, no reported pain  ENT:  No sore throat, pain, runny nose, dysphagia  CV:  No pain, palpitations, hypo/hypertension  Resp:  No dyspnea, cough, tachypnea, wheezing  GI:  No pain, No nausea, No vomiting, No diarrhea, No constipation, No weight loss, No fever, No pruritis, No rectal bleeding, No tarry stools, No dysphagia,  :  No pain, bleeding, incontinence, nocturia  Muscle:  No pain, weakness  Neuro:  No weakness, tingling, memory problems  Psych:  No fatigue, insomnia, mood problems, depression  Endocrine:  No polyuria, polydipsia, cold/heat intolerance  Heme:  No petechiae, ecchymosis, easy bruisability  Skin:  No rash, tattoos, scars, edema      PHYSICAL EXAM:   Vital Signs:  Vital Signs Last 24 Hrs  T(C): 36.8 (2025 15:16), Max: 36.9 (2025 10:31)  T(F): 98.2 (2025 15:16), Max: 98.5 (2025 10:31)  HR: 84 (2025 15:16) (76 - 93)  BP: 122/80 (2025 15:16) (115/74 - 135/79)  BP(mean): --  RR: 18 (2025 15:16) (18 - 20)  SpO2: 98% (2025 15:16) (98% - 98%)      Daily Height in cm: 167.64 (2025 09:35)    Daily     GENERAL:  Appears stated age, well-groomed, well-nourished, no distress  HEENT:  NC/AT,  conjunctivae clear and pink, no thyromegaly, nodules, adenopathy, no JVD, sclera -anicteric  CHEST:  Full & symmetric excursion, no increased effort, breath sounds clear  HEART:  Regular rhythm, S1, S2, no murmur/rub/S3/S4, no abdominal bruit, no edema  ABDOMEN:  Soft, non-tender, non-distended, normoactive bowel sounds,  no masses ,no hepato-splenomegaly, no signs of chronic liver disease  EXTEREMITIES:  no cyanosis,clubbing or edema  SKIN:  No rash/erythema/ecchymoses/petechiae/wounds/abscess/warm/dry  NEURO:  Alert, oriented, no asterixis, no tremor, no encephalopathy    LABS:                        15.1   9.02  )-----------( 323      ( 2025 11:07 )             43.7     04-07    142  |  105  |  17  ----------------------------<  99  3.8   |  27  |  0.83    Ca    10.0      2025 11:07    TPro  7.1  /  Alb  3.9  /  TBili  0.6  /  DBili  x   /  AST  13  /  ALT  19  /  AlkPhos  86  04-07    LIVER FUNCTIONS - ( 2025 11:07 )  Alb: 3.9 g/dL / Pro: 7.1 g/dL / ALK PHOS: 86 U/L / ALT: 19 U/L / AST: 13 U/L / GGT: x             Urinalysis Basic - ( 2025 11:07 )    Color: Dark Yellow / Appearance: Cloudy / S.047 / pH: x  Gluc: 99 mg/dL / Ketone: 15 mg/dL  / Bili: Moderate / Urobili: 1.0 mg/dL   Blood: x / Protein: 100 mg/dL / Nitrite: Negative   Leuk Esterase: Trace / RBC: 1 /HPF / WBC 1 /HPF   Sq Epi: x / Non Sq Epi: x / Bacteria: Few /HPF      Amylase Serum--      Lipase serum14       Ammonia--      Imaging:  < from: CT Abdomen and Pelvis w/ Oral Cont and w/ IV Cont (25 @ 13:23) >    IMPRESSION:  Markedly distended gallbladder. Correlate with symptomatology right upper   quadrant ultrasound as clinically indicated.    Indeterminate cortical hypodense lesion left kidney. Correlate with renal   ultrasound        --- End of Report ---        < end of copied text >

## 2025-04-08 LAB
ALBUMIN SERPL ELPH-MCNC: 3.6 G/DL — SIGNIFICANT CHANGE UP (ref 3.3–5)
ALP SERPL-CCNC: 79 U/L — SIGNIFICANT CHANGE UP (ref 30–120)
ALT FLD-CCNC: 21 U/L — SIGNIFICANT CHANGE UP (ref 10–60)
ANION GAP SERPL CALC-SCNC: 10 MMOL/L — SIGNIFICANT CHANGE UP (ref 5–17)
AST SERPL-CCNC: 15 U/L — SIGNIFICANT CHANGE UP (ref 10–40)
BASOPHILS # BLD AUTO: 0.06 K/UL — SIGNIFICANT CHANGE UP (ref 0–0.2)
BASOPHILS NFR BLD AUTO: 0.9 % — SIGNIFICANT CHANGE UP (ref 0–2)
BILIRUB SERPL-MCNC: 0.5 MG/DL — SIGNIFICANT CHANGE UP (ref 0.2–1.2)
BUN SERPL-MCNC: 9 MG/DL — SIGNIFICANT CHANGE UP (ref 7–23)
CALCIUM SERPL-MCNC: 9 MG/DL — SIGNIFICANT CHANGE UP (ref 8.4–10.5)
CHLORIDE SERPL-SCNC: 107 MMOL/L — SIGNIFICANT CHANGE UP (ref 96–108)
CO2 SERPL-SCNC: 25 MMOL/L — SIGNIFICANT CHANGE UP (ref 22–31)
CREAT SERPL-MCNC: 0.67 MG/DL — SIGNIFICANT CHANGE UP (ref 0.5–1.3)
EGFR: 99 ML/MIN/1.73M2 — SIGNIFICANT CHANGE UP
EGFR: 99 ML/MIN/1.73M2 — SIGNIFICANT CHANGE UP
EOSINOPHIL # BLD AUTO: 0.27 K/UL — SIGNIFICANT CHANGE UP (ref 0–0.5)
EOSINOPHIL NFR BLD AUTO: 3.9 % — SIGNIFICANT CHANGE UP (ref 0–6)
GGT SERPL-CCNC: 18 U/L — SIGNIFICANT CHANGE UP (ref 8–40)
GLUCOSE SERPL-MCNC: 99 MG/DL — SIGNIFICANT CHANGE UP (ref 70–99)
HCT VFR BLD CALC: 41 % — SIGNIFICANT CHANGE UP (ref 34.5–45)
HGB BLD-MCNC: 14 G/DL — SIGNIFICANT CHANGE UP (ref 11.5–15.5)
IMM GRANULOCYTES NFR BLD AUTO: 0.4 % — SIGNIFICANT CHANGE UP (ref 0–0.9)
LIDOCAIN IGE QN: 17 U/L — SIGNIFICANT CHANGE UP (ref 16–77)
LYMPHOCYTES # BLD AUTO: 1.87 K/UL — SIGNIFICANT CHANGE UP (ref 1–3.3)
LYMPHOCYTES # BLD AUTO: 27.2 % — SIGNIFICANT CHANGE UP (ref 13–44)
MAGNESIUM SERPL-MCNC: 2 MG/DL — SIGNIFICANT CHANGE UP (ref 1.6–2.6)
MCHC RBC-ENTMCNC: 30.3 PG — SIGNIFICANT CHANGE UP (ref 27–34)
MCHC RBC-ENTMCNC: 34.1 G/DL — SIGNIFICANT CHANGE UP (ref 32–36)
MCV RBC AUTO: 88.7 FL — SIGNIFICANT CHANGE UP (ref 80–100)
MONOCYTES # BLD AUTO: 0.68 K/UL — SIGNIFICANT CHANGE UP (ref 0–0.9)
MONOCYTES NFR BLD AUTO: 9.9 % — SIGNIFICANT CHANGE UP (ref 2–14)
NEUTROPHILS # BLD AUTO: 3.97 K/UL — SIGNIFICANT CHANGE UP (ref 1.8–7.4)
NEUTROPHILS NFR BLD AUTO: 57.7 % — SIGNIFICANT CHANGE UP (ref 43–77)
NRBC BLD AUTO-RTO: 0 /100 WBCS — SIGNIFICANT CHANGE UP (ref 0–0)
PLATELET # BLD AUTO: 264 K/UL — SIGNIFICANT CHANGE UP (ref 150–400)
POTASSIUM SERPL-MCNC: 3.6 MMOL/L — SIGNIFICANT CHANGE UP (ref 3.5–5.3)
POTASSIUM SERPL-SCNC: 3.6 MMOL/L — SIGNIFICANT CHANGE UP (ref 3.5–5.3)
PROT SERPL-MCNC: 6.2 G/DL — SIGNIFICANT CHANGE UP (ref 6–8.3)
RBC # BLD: 4.62 M/UL — SIGNIFICANT CHANGE UP (ref 3.8–5.2)
RBC # FLD: 12.7 % — SIGNIFICANT CHANGE UP (ref 10.3–14.5)
SODIUM SERPL-SCNC: 142 MMOL/L — SIGNIFICANT CHANGE UP (ref 135–145)
WBC # BLD: 6.88 K/UL — SIGNIFICANT CHANGE UP (ref 3.8–10.5)
WBC # FLD AUTO: 6.88 K/UL — SIGNIFICANT CHANGE UP (ref 3.8–10.5)

## 2025-04-08 PROCEDURE — 99233 SBSQ HOSP IP/OBS HIGH 50: CPT

## 2025-04-08 RX ORDER — SODIUM CHLORIDE 9 G/1000ML
1000 INJECTION, SOLUTION INTRAVENOUS
Refills: 0 | Status: COMPLETED | OUTPATIENT
Start: 2025-04-08 | End: 2025-04-08

## 2025-04-08 RX ORDER — FOLIC ACID 1 MG/1
1 TABLET ORAL DAILY
Refills: 0 | Status: DISCONTINUED | OUTPATIENT
Start: 2025-04-08 | End: 2025-04-09

## 2025-04-08 RX ORDER — BISACODYL 5 MG
10 TABLET, DELAYED RELEASE (ENTERIC COATED) ORAL DAILY
Refills: 0 | Status: DISCONTINUED | OUTPATIENT
Start: 2025-04-08 | End: 2025-04-09

## 2025-04-08 RX ORDER — MINERAL OIL
133 OIL (ML) MISCELLANEOUS ONCE
Refills: 0 | Status: COMPLETED | OUTPATIENT
Start: 2025-04-08 | End: 2025-04-08

## 2025-04-08 RX ORDER — ACETAMINOPHEN 500 MG/5ML
1000 LIQUID (ML) ORAL EVERY 6 HOURS
Refills: 0 | Status: DISCONTINUED | OUTPATIENT
Start: 2025-04-08 | End: 2025-04-09

## 2025-04-08 RX ORDER — TIZANIDINE 4 MG/1
4 TABLET ORAL EVERY 6 HOURS
Refills: 0 | Status: DISCONTINUED | OUTPATIENT
Start: 2025-04-08 | End: 2025-04-09

## 2025-04-08 RX ADMIN — Medication 5 MILLIGRAM(S): at 22:56

## 2025-04-08 RX ADMIN — Medication 100 MILLIGRAM(S): at 12:42

## 2025-04-08 RX ADMIN — KETOROLAC TROMETHAMINE 15 MILLIGRAM(S): 30 INJECTION, SOLUTION INTRAMUSCULAR; INTRAVENOUS at 14:03

## 2025-04-08 RX ADMIN — POLYETHYLENE GLYCOL 3350 17 GRAM(S): 17 POWDER, FOR SOLUTION ORAL at 17:20

## 2025-04-08 RX ADMIN — NICOTINE POLACRILEX 1 PATCH: 4 GUM, CHEWING ORAL at 20:56

## 2025-04-08 RX ADMIN — Medication 20 MILLIGRAM(S): at 21:04

## 2025-04-08 RX ADMIN — POLYETHYLENE GLYCOL 3350 17 GRAM(S): 17 POWDER, FOR SOLUTION ORAL at 05:23

## 2025-04-08 RX ADMIN — Medication 0.12 MILLIGRAM(S): at 14:42

## 2025-04-08 RX ADMIN — Medication 200 MICROGRAM(S): at 21:04

## 2025-04-08 RX ADMIN — Medication 400 MILLIGRAM(S): at 05:23

## 2025-04-08 RX ADMIN — KETOROLAC TROMETHAMINE 15 MILLIGRAM(S): 30 INJECTION, SOLUTION INTRAMUSCULAR; INTRAVENOUS at 22:05

## 2025-04-08 RX ADMIN — KETOROLAC TROMETHAMINE 15 MILLIGRAM(S): 30 INJECTION, SOLUTION INTRAMUSCULAR; INTRAVENOUS at 14:30

## 2025-04-08 RX ADMIN — Medication 133 MILLILITER(S): at 08:48

## 2025-04-08 RX ADMIN — ENOXAPARIN SODIUM 40 MILLIGRAM(S): 100 INJECTION SUBCUTANEOUS at 17:20

## 2025-04-08 RX ADMIN — Medication 40 MILLIGRAM(S): at 05:24

## 2025-04-08 RX ADMIN — KETOROLAC TROMETHAMINE 15 MILLIGRAM(S): 30 INJECTION, SOLUTION INTRAMUSCULAR; INTRAVENOUS at 07:30

## 2025-04-08 RX ADMIN — SODIUM CHLORIDE 100 MILLILITER(S): 9 INJECTION, SOLUTION INTRAVENOUS at 10:35

## 2025-04-08 RX ADMIN — FOLIC ACID 1 MILLIGRAM(S): 1 TABLET ORAL at 12:41

## 2025-04-08 RX ADMIN — Medication 1000 MILLIGRAM(S): at 06:38

## 2025-04-08 RX ADMIN — Medication 0.5 MILLIGRAM(S): at 21:04

## 2025-04-08 RX ADMIN — KETOROLAC TROMETHAMINE 15 MILLIGRAM(S): 30 INJECTION, SOLUTION INTRAMUSCULAR; INTRAVENOUS at 08:00

## 2025-04-08 RX ADMIN — Medication 0.12 MILLIGRAM(S): at 10:35

## 2025-04-08 RX ADMIN — Medication 1000 MILLIGRAM(S): at 12:41

## 2025-04-08 RX ADMIN — TIZANIDINE 4 MILLIGRAM(S): 4 TABLET ORAL at 18:39

## 2025-04-08 RX ADMIN — KETOROLAC TROMETHAMINE 15 MILLIGRAM(S): 30 INJECTION, SOLUTION INTRAMUSCULAR; INTRAVENOUS at 01:45

## 2025-04-08 RX ADMIN — TIZANIDINE 2 MILLIGRAM(S): 4 TABLET ORAL at 09:50

## 2025-04-08 RX ADMIN — NICOTINE POLACRILEX 1 PATCH: 4 GUM, CHEWING ORAL at 07:26

## 2025-04-08 RX ADMIN — SODIUM CHLORIDE 100 MILLILITER(S): 9 INJECTION, SOLUTION INTRAVENOUS at 21:49

## 2025-04-08 RX ADMIN — TIZANIDINE 2 MILLIGRAM(S): 4 TABLET ORAL at 01:34

## 2025-04-08 RX ADMIN — LOSARTAN POTASSIUM 50 MILLIGRAM(S): 100 TABLET, FILM COATED ORAL at 21:04

## 2025-04-08 RX ADMIN — ENOXAPARIN SODIUM 40 MILLIGRAM(S): 100 INJECTION SUBCUTANEOUS at 05:23

## 2025-04-08 RX ADMIN — KETOROLAC TROMETHAMINE 15 MILLIGRAM(S): 30 INJECTION, SOLUTION INTRAMUSCULAR; INTRAVENOUS at 21:49

## 2025-04-08 RX ADMIN — AMLODIPINE BESYLATE 5 MILLIGRAM(S): 10 TABLET ORAL at 05:23

## 2025-04-08 RX ADMIN — Medication 0.5 MILLIGRAM(S): at 05:22

## 2025-04-08 RX ADMIN — KETOROLAC TROMETHAMINE 15 MILLIGRAM(S): 30 INJECTION, SOLUTION INTRAMUSCULAR; INTRAVENOUS at 01:01

## 2025-04-08 RX ADMIN — Medication 400 MILLIGRAM(S): at 12:41

## 2025-04-08 RX ADMIN — Medication 5 MILLIGRAM(S): at 01:34

## 2025-04-08 NOTE — PROGRESS NOTE ADULT - ASSESSMENT
59F HTN, Obesity s/p lap sleeve gastrectomy, hypothyroid, neuropathy, OA right hip s/p THR, chronic back pain s/p laminectomy, presented with 4 days of LLQ abd pain of unclear etiology    LLQ abd pain  - d/w surgery and GI  - pt with copies of results from [facility] - no significant findings on 2 CT scans, abd and pelvic US  - continue bowel regimen and hydration  - Pain medication - tylenol, Toradol prn; will try to avoid opioids as may worsen constipation  - intra-abdominal muscle spasms? continue Zanaflex and Levsin prn  - continue PPI    Distended GB & CBD on imaging  - does not appear to be source of pain  - reviewed US  - outpatient work up    HTN  - continue Norvasc and Losartan with hold parameters    Hypothyroid  - continue alternating doses of 175 mcg & 200 mcg QHS    Anxiety  - continue nortriptyline QHS  - continue xanax prn    DVT proph  - Lovenox    Dr Knox (PMD) aware of admission       59F HTN, Obesity s/p lap sleeve gastrectomy, hypothyroid, neuropathy, OA right hip s/p THR, chronic back pain s/p laminectomy, presented with 4 days of LLQ abd pain of unclear etiology    LLQ abd pain  - d/w surgery and GI  - pt with copies of results from [facility] - no significant findings on 2 CT scans, abd and pelvic US  - continue bowel regimen and hydration  - Pain medication - tylenol, Toradol prn; will try to avoid opioids as may worsen constipation  - intra-abdominal muscle spasms? continue Zanaflex and Levsin prn  - continue PPI    Distended GB & CBD on imaging  - does not appear to be source of pain  - reviewed US  - outpatient work up    HTN  - continue Norvasc and Losartan with hold parameters    Hypothyroid  - continue alternating doses of 175 mcg & 200 mcg QHS    Anxiety  - continue nortriptyline QHS  - continue xanax prn    Smoker < 1/2 ppd  - continue nicotine patch    DVT proph  - Lovenox    Dr Knox (PMD) aware of admission

## 2025-04-08 NOTE — CARE COORDINATION ASSESSMENT. - NSCAREPROVIDERS_GEN_ALL_CORE_FT
CARE PROVIDERS:  Accepting Physician: Armand Perera  Administration: Marina Duarte  Administration: Carlos Manuel Hanna  Admitting: Armand Perera  Attending: Armand Perera  Cardiology Technician: Lewis Parikh  Case Management: Santaromana, Anna  Consultant: Dustin Cooper  Consultant: Kirit Lundberg  Consultant: Fabián Weeks  Covering Team: LESTER Kelley  ED Attending: Phillip Carreon  ED Nurse: Katheryn Craig  HIM/Billing & Coding: Nely Braga  Infection Control: Roseline Philippe  Nurse: Rosangela Castorena  Ordered: Doctor, Unknown  PCA/Nursing Assistant: Farooq Ramesh  Primary Team: Wesley Regalado  Primary Team: Agata Rivera  Primary Team: Armand Perera  Registered Dietitian: Zulma Jonas  : Lizzy Skaggs  Student: Kosta Harrell  Team: [facility], Team  UR// Supp. Assoc.: Kaykay Mendoza

## 2025-04-08 NOTE — PATIENT CHOICE NOTE. - NSPTCHOICENOTES_GEN_ALL_CORE
TRANSITION OF CARE PLAN:  Patient A & O x4;  is self-directing own DC planning; DC to home; Self-care; will  f/u with Dr. LINDA Rivera post DC; will arrange own transport at DC. NO DME NEEDS; No skilled needs identified at time of assessment; patient in agreement; Writer furnished patient with care transitions folder and CM contact information for reference;

## 2025-04-08 NOTE — CARE COORDINATION ASSESSMENT. - NSADDITIONAL INFORMATION_FT
RN CCM met with pt. at bedside, pt. A&O x4; CM role and DC planning process explained; verbalized understanding. Pt. reports; she is employed;   independent in ambulation, stairs prior to hospitalization, ADLs/ iADLs; strong family support at home;     TRANSITION OF CARE PLAN:  Patient A & O x4;  is self-directing own DC planning; DC to home; Self-care; will  f/u with Dr. LINDA Rivera post DC; will arrange own transport at DC. NO DME NEEDS; No skilled needs identified at time of assessment; patient in agreement; Writer furnished patient with care transitions folder and CM contact information for reference;

## 2025-04-08 NOTE — PROGRESS NOTE ADULT - ASSESSMENT
LLQ abdominal tenderness  Distended GB    Recommendations:  - CT with moderate colonic stool burden, distended GB and CBD dilation on ultrasound  - MRCP w/w/o contrast can be performed outpt however pt feels more comfortable completing work up prior to d/c. LFTs and WBC are reassuringly stable.  - Begin Miralax 17g BID and Dulcolax suppository daily  - f/u Surgery recs, can consider HIDA/HIDA CCK as outpt  - Pain control  - IVF  - ADAT  - Will follow with you    Dustin Cooper M.D.  [facility]  [phone]    Advanced care planning was discussed with patient and family.  Advanced care planning forms were reviewed and discussed.  Risks, benefits and alternatives of gastroenterologic procedures were discussed in detail and all questions were answered  60 minutes spent on total encounter of which more than fifty percent of the encounter was spent counseling and/or coordinating care by the attending physician.

## 2025-04-09 ENCOUNTER — APPOINTMENT (OUTPATIENT)
Dept: INTERNAL MEDICINE | Facility: CLINIC | Age: 60
End: 2025-04-09
Payer: COMMERCIAL

## 2025-04-09 ENCOUNTER — TRANSCRIPTION ENCOUNTER (OUTPATIENT)
Age: 60
End: 2025-04-09

## 2025-04-09 VITALS
HEART RATE: 63 BPM | RESPIRATION RATE: 18 BRPM | DIASTOLIC BLOOD PRESSURE: 81 MMHG | SYSTOLIC BLOOD PRESSURE: 169 MMHG | TEMPERATURE: 98 F | OXYGEN SATURATION: 97 %

## 2025-04-09 DIAGNOSIS — F41.9 ANXIETY DISORDER, UNSPECIFIED: ICD-10-CM

## 2025-04-09 DIAGNOSIS — E03.9 HYPOTHYROIDISM, UNSPECIFIED: ICD-10-CM

## 2025-04-09 DIAGNOSIS — G47.00 INSOMNIA, UNSPECIFIED: ICD-10-CM

## 2025-04-09 DIAGNOSIS — I10 ESSENTIAL (PRIMARY) HYPERTENSION: ICD-10-CM

## 2025-04-09 LAB
ALBUMIN SERPL ELPH-MCNC: 3.2 G/DL — LOW (ref 3.3–5)
ALP SERPL-CCNC: 73 U/L — SIGNIFICANT CHANGE UP (ref 30–120)
ALT FLD-CCNC: 21 U/L — SIGNIFICANT CHANGE UP (ref 10–60)
ANION GAP SERPL CALC-SCNC: 9 MMOL/L — SIGNIFICANT CHANGE UP (ref 5–17)
AST SERPL-CCNC: 18 U/L — SIGNIFICANT CHANGE UP (ref 10–40)
BASOPHILS # BLD AUTO: 0.06 K/UL — SIGNIFICANT CHANGE UP (ref 0–0.2)
BASOPHILS NFR BLD AUTO: 0.8 % — SIGNIFICANT CHANGE UP (ref 0–2)
BILIRUB SERPL-MCNC: 0.6 MG/DL — SIGNIFICANT CHANGE UP (ref 0.2–1.2)
BUN SERPL-MCNC: 7 MG/DL — SIGNIFICANT CHANGE UP (ref 7–23)
CALCIUM SERPL-MCNC: 8.9 MG/DL — SIGNIFICANT CHANGE UP (ref 8.4–10.5)
CHLORIDE SERPL-SCNC: 108 MMOL/L — SIGNIFICANT CHANGE UP (ref 96–108)
CO2 SERPL-SCNC: 25 MMOL/L — SIGNIFICANT CHANGE UP (ref 22–31)
CREAT SERPL-MCNC: 0.52 MG/DL — SIGNIFICANT CHANGE UP (ref 0.5–1.3)
CULTURE RESULTS: SIGNIFICANT CHANGE UP
EGFR: 106 ML/MIN/1.73M2 — SIGNIFICANT CHANGE UP
EGFR: 106 ML/MIN/1.73M2 — SIGNIFICANT CHANGE UP
EOSINOPHIL # BLD AUTO: 0.36 K/UL — SIGNIFICANT CHANGE UP (ref 0–0.5)
EOSINOPHIL NFR BLD AUTO: 4.9 % — SIGNIFICANT CHANGE UP (ref 0–6)
GLUCOSE SERPL-MCNC: 91 MG/DL — SIGNIFICANT CHANGE UP (ref 70–99)
HCT VFR BLD CALC: 37.6 % — SIGNIFICANT CHANGE UP (ref 34.5–45)
HGB BLD-MCNC: 12.8 G/DL — SIGNIFICANT CHANGE UP (ref 11.5–15.5)
IMM GRANULOCYTES NFR BLD AUTO: 0.3 % — SIGNIFICANT CHANGE UP (ref 0–0.9)
LYMPHOCYTES # BLD AUTO: 1.93 K/UL — SIGNIFICANT CHANGE UP (ref 1–3.3)
LYMPHOCYTES # BLD AUTO: 26.4 % — SIGNIFICANT CHANGE UP (ref 13–44)
MAGNESIUM SERPL-MCNC: 2 MG/DL — SIGNIFICANT CHANGE UP (ref 1.6–2.6)
MCHC RBC-ENTMCNC: 30 PG — SIGNIFICANT CHANGE UP (ref 27–34)
MCHC RBC-ENTMCNC: 34 G/DL — SIGNIFICANT CHANGE UP (ref 32–36)
MCV RBC AUTO: 88.3 FL — SIGNIFICANT CHANGE UP (ref 80–100)
MONOCYTES # BLD AUTO: 0.7 K/UL — SIGNIFICANT CHANGE UP (ref 0–0.9)
MONOCYTES NFR BLD AUTO: 9.6 % — SIGNIFICANT CHANGE UP (ref 2–14)
NEUTROPHILS # BLD AUTO: 4.24 K/UL — SIGNIFICANT CHANGE UP (ref 1.8–7.4)
NEUTROPHILS NFR BLD AUTO: 58 % — SIGNIFICANT CHANGE UP (ref 43–77)
NRBC BLD AUTO-RTO: 0 /100 WBCS — SIGNIFICANT CHANGE UP (ref 0–0)
PLATELET # BLD AUTO: 243 K/UL — SIGNIFICANT CHANGE UP (ref 150–400)
POTASSIUM SERPL-MCNC: 4.4 MMOL/L — SIGNIFICANT CHANGE UP (ref 3.5–5.3)
POTASSIUM SERPL-SCNC: 4.4 MMOL/L — SIGNIFICANT CHANGE UP (ref 3.5–5.3)
PROT SERPL-MCNC: 5.9 G/DL — LOW (ref 6–8.3)
RBC # BLD: 4.26 M/UL — SIGNIFICANT CHANGE UP (ref 3.8–5.2)
RBC # FLD: 12.8 % — SIGNIFICANT CHANGE UP (ref 10.3–14.5)
SODIUM SERPL-SCNC: 142 MMOL/L — SIGNIFICANT CHANGE UP (ref 135–145)
SPECIMEN SOURCE: SIGNIFICANT CHANGE UP
WBC # BLD: 7.31 K/UL — SIGNIFICANT CHANGE UP (ref 3.8–10.5)
WBC # FLD AUTO: 7.31 K/UL — SIGNIFICANT CHANGE UP (ref 3.8–10.5)

## 2025-04-09 PROCEDURE — 81001 URINALYSIS AUTO W/SCOPE: CPT

## 2025-04-09 PROCEDURE — G2211 COMPLEX E/M VISIT ADD ON: CPT | Mod: NC,95

## 2025-04-09 PROCEDURE — 99239 HOSP IP/OBS DSCHRG MGMT >30: CPT

## 2025-04-09 PROCEDURE — 96374 THER/PROPH/DIAG INJ IV PUSH: CPT

## 2025-04-09 PROCEDURE — 96375 TX/PRO/DX INJ NEW DRUG ADDON: CPT

## 2025-04-09 PROCEDURE — 99285 EMERGENCY DEPT VISIT HI MDM: CPT

## 2025-04-09 PROCEDURE — 84702 CHORIONIC GONADOTROPIN TEST: CPT

## 2025-04-09 PROCEDURE — 83735 ASSAY OF MAGNESIUM: CPT

## 2025-04-09 PROCEDURE — 87086 URINE CULTURE/COLONY COUNT: CPT

## 2025-04-09 PROCEDURE — 36415 COLL VENOUS BLD VENIPUNCTURE: CPT

## 2025-04-09 PROCEDURE — 80053 COMPREHEN METABOLIC PANEL: CPT

## 2025-04-09 PROCEDURE — 74177 CT ABD & PELVIS W/CONTRAST: CPT | Mod: MC

## 2025-04-09 PROCEDURE — 83605 ASSAY OF LACTIC ACID: CPT

## 2025-04-09 PROCEDURE — 93005 ELECTROCARDIOGRAM TRACING: CPT

## 2025-04-09 PROCEDURE — 82977 ASSAY OF GGT: CPT

## 2025-04-09 PROCEDURE — 85025 COMPLETE CBC W/AUTO DIFF WBC: CPT

## 2025-04-09 PROCEDURE — 76700 US EXAM ABDOM COMPLETE: CPT

## 2025-04-09 PROCEDURE — 99213 OFFICE O/P EST LOW 20 MIN: CPT | Mod: 95

## 2025-04-09 PROCEDURE — 83690 ASSAY OF LIPASE: CPT

## 2025-04-09 PROCEDURE — 96376 TX/PRO/DX INJ SAME DRUG ADON: CPT

## 2025-04-09 RX ORDER — ACETAMINOPHEN 500 MG/5ML
2 LIQUID (ML) ORAL
Qty: 0 | Refills: 0 | DISCHARGE
Start: 2025-04-09

## 2025-04-09 RX ORDER — NICOTINE POLACRILEX 4 MG/1
1 GUM, CHEWING ORAL
Qty: 0 | Refills: 0 | DISCHARGE
Start: 2025-04-09

## 2025-04-09 RX ORDER — SODIUM CHLORIDE 9 G/1000ML
1000 INJECTION, SOLUTION INTRAVENOUS
Refills: 0 | Status: COMPLETED | OUTPATIENT
Start: 2025-04-09 | End: 2025-04-09

## 2025-04-09 RX ORDER — POLYETHYLENE GLYCOL 3350 17 G/17G
17 POWDER, FOR SOLUTION ORAL
Qty: 0 | Refills: 0 | DISCHARGE
Start: 2025-04-09

## 2025-04-09 RX ORDER — TIZANIDINE 4 MG/1
1 TABLET ORAL
Qty: 30 | Refills: 0
Start: 2025-04-09

## 2025-04-09 RX ORDER — B1/B2/B3/B5/B6/B12/VIT C/FOLIC 500-0.5 MG
1 TABLET ORAL
Qty: 0 | Refills: 0 | DISCHARGE

## 2025-04-09 RX ORDER — LOSARTAN POTASSIUM 100 MG/1
1 TABLET, FILM COATED ORAL
Qty: 0 | Refills: 0 | DISCHARGE
Start: 2025-04-09

## 2025-04-09 RX ADMIN — Medication 100 MILLIGRAM(S): at 11:39

## 2025-04-09 RX ADMIN — Medication 1000 MILLIGRAM(S): at 11:39

## 2025-04-09 RX ADMIN — FOLIC ACID 1 MILLIGRAM(S): 1 TABLET ORAL at 12:26

## 2025-04-09 RX ADMIN — KETOROLAC TROMETHAMINE 15 MILLIGRAM(S): 30 INJECTION, SOLUTION INTRAMUSCULAR; INTRAVENOUS at 07:00

## 2025-04-09 RX ADMIN — ENOXAPARIN SODIUM 40 MILLIGRAM(S): 100 INJECTION SUBCUTANEOUS at 06:43

## 2025-04-09 RX ADMIN — AMLODIPINE BESYLATE 5 MILLIGRAM(S): 10 TABLET ORAL at 06:42

## 2025-04-09 RX ADMIN — SODIUM CHLORIDE 100 MILLILITER(S): 9 INJECTION, SOLUTION INTRAVENOUS at 09:20

## 2025-04-09 RX ADMIN — Medication 40 MILLIGRAM(S): at 06:42

## 2025-04-09 RX ADMIN — Medication 1000 MILLIGRAM(S): at 12:39

## 2025-04-09 RX ADMIN — KETOROLAC TROMETHAMINE 15 MILLIGRAM(S): 30 INJECTION, SOLUTION INTRAMUSCULAR; INTRAVENOUS at 06:43

## 2025-04-09 RX ADMIN — Medication 5 MILLIGRAM(S): at 00:00

## 2025-04-09 RX ADMIN — Medication 0.12 MILLIGRAM(S): at 06:42

## 2025-04-09 NOTE — DISCHARGE NOTE PROVIDER - NSDCFUSCHEDAPPT_GEN_ALL_CORE_FT
Clifton Knox  [facility]  INT[facility] [address]  Scheduled Appointment: 04/09/2025    [facility]  [facility] PAT-Pre-Surgical Testing  Scheduled Appointment: 05/06/2025    Scar Henry  [facility]  ORTHOSURG [address]  Scheduled Appointment: 05/22/2025    [facility]  [facility] ASC-Amb Surgery  Scheduled Appointment: 05/22/2025    Clifton Knox  [facility]  [facility] [address]  Scheduled Appointment: 07/07/2025

## 2025-04-09 NOTE — PROGRESS NOTE ADULT - ASSESSMENT
LLQ abdominal tenderness  Distended GB    Recommendations:  - CT with moderate colonic stool burden, distended GB and CBD dilation on ultrasound  - MRCP w/w/o contrast can be performed outpt. LFTs and WBC are reassuringly stable.  - Miralax 17g BID and Dulcolax suppository daily  - Pain control  - IVF  - ADAT  - d/c planning  - Will follow with you    Dustin Cooper M.D.  [facility]  [phone]    35 minutes spent on total encounter of which more than fifty percent of the encounter was spent counseling and/or coordinating care by the attending physician.

## 2025-04-09 NOTE — DISCHARGE NOTE NURSING/CASE MANAGEMENT/SOCIAL WORK - FINANCIAL ASSISTANCE
[facility] provides services at a reduced cost to those who are determined to be eligible through [facility]’s financial assistance program. Information regarding [facility]’s financial assistance program can be found by going to [email or web address] or by calling [phone].

## 2025-04-09 NOTE — DISCHARGE NOTE PROVIDER - HOSPITAL COURSE
59F HTN, Obesity s/p lap sleeve gastrectomy, hypothyroid, neuropathy, OA right hip s/p THR, chronic back pain s/p laminectomy, presented with 4 days of LLQ abd pain.   Severe stabbing pain started suddenly 4 days ago. associated with chills with no fever and nausea without vomiting.  Not related to food, but is very positional and increases with pressure.  +chronic constipation that hasnt worsened lately.  Did not eat strange new foods, no recent travel. no melena or hematochezia. First she had some increase of her left lower back pain, that does occasionally get exacerbated, but that has improved and this pain has worsened. No pain in her left hip, walking does not increase the pain but bending over does. No increase in her neuropathic pain despite stopping the Lyrica (suggested by the [facility] physician).      On Friday and yesterday she went to the ED at [facility].  They did CT a/p x2, US pelvic/transvaginal, CT left hip, abd US without elucidation of the pain. Labs essentially normal.  She did get antibiotics for UTI but that did not relieve the pain.  Muscle relaxers did not work either.  Only Morphine, Dilaudid and Toradol seem to have helped at all.  She normally takes her Zepbound on Saturdays and she skipped her last dose.   She went to see Dr Rivera today who sent her to the ED for evaluation.     LLQ abd pain  - d/w surgery and GI  - pt with copies of results from [facility] - no significant findings on 2 CT scans, abd and pelvic US  - repeat CT a/p and US here showed distended GB and CBD without acute cholecystitis  - continue bowel regimen and hydration  - Pain medication - tylenol, dc IV toradol; avoid opioids as may worsen constipation  - intra-abdominal muscle spasms? continue Zanaflex and Levsin prn  - continue PPI  - etiology still unclear - but since pain better controlled can continue outpatient care.     Distended GB & CBD on CT and US  - does not appear to be source of pain  - LFTs, GGT, lipase normal  - outpatient work up    HTN  - continue Norvasc and Losartan with hold parameters    Hypothyroid  - continue alternating doses of 175 mcg & 200 mcg QHS    Anxiety  - continue nortriptyline QHS  - continue xanax prn    Smoker < 1/2 ppd  - continue nicotine patch    DVT proph  - Lovenox    Dr Knox (PMD) aware of admission

## 2025-04-09 NOTE — CASE MANAGEMENT PROGRESS NOTE - NSCMPROGRESSNOTE_GEN_ALL_CORE
FINAL DISCHARGE NOTE  Patient A&Ox4; anticipating clearance for DC today 04/09/2025; DC to home; self-care; no skilled needs anticipated at DC; patient in agreement; patient will arrange own transport at DC;

## 2025-04-09 NOTE — DISCHARGE NOTE PROVIDER - NSDCCPCAREPLAN_GEN_ALL_CORE_FT
PRINCIPAL DISCHARGE DIAGNOSIS  Diagnosis: Intractable abdominal pain  Assessment and Plan of Treatment: conitnue levsin and zanaflex as needed.  follow up with Dr Rivera, GI - Dr Mondragon for further evaluation and treatment

## 2025-04-09 NOTE — DISCHARGE NOTE PROVIDER - NSDCMRMEDTOKEN_GEN_ALL_CORE_FT
acetaminophen 500 mg oral tablet: 2 tab(s) orally every 6 hours As needed Mild Pain (1 - 3)  Ambien 10 mg oral tablet: 1 tab(s) orally once a day (at bedtime)  amLODIPine 5 mg oral tablet: 1 tab(s) orally once a day  hyoscyamine 0.125 mg sublingual tablet: 1 tab(s) sublingual every 6 hours as needed for abdominal pain MDD: 4 tabs  losartan 50 mg oral tablet: 1 tab(s) orally once a day (at bedtime)  Lyrica 100 mg oral capsule: 1 cap(s) orally once a day  MiraLax oral powder for reconstitution: 17 gram(s) orally once a day  multivitamin: 1 tab(s) orally once a day  nicotine 7 mg/24 hr transdermal film, extended release: 1 patch transdermal once a day  nortriptyline 10 mg oral capsule: 2 cap(s) orally once a day (at bedtime)  Synthroid 175 mcg (0.175 mg) oral tablet: 1 tab(s) orally every other day  Synthroid 200 mcg (0.2 mg) oral tablet: 1 tab(s) orally every other day  tiZANidine 4 mg oral tablet: 1 tab(s) orally every 6 hours as needed for Muscle Spasm MDD: 4 tabs  Xanax 1 mg oral tablet: 1 tab(s) orally once a day, As Needed - for anxiety

## 2025-04-09 NOTE — DISCHARGE NOTE PROVIDER - PROVIDER TOKENS
PROVIDER:[TOKEN:[[id number]]] PROVIDER:[TOKEN:[[id number]]],PROVIDER:[TOKEN:[[id number]]],PROVIDER:[TOKEN:[[id number]]]

## 2025-04-09 NOTE — DISCHARGE NOTE NURSING/CASE MANAGEMENT/SOCIAL WORK - NSDCPEWEB_GEN_ALL_CORE
[facility] for Tobacco Control website --- [email or web address] website --- [email or web address]

## 2025-04-09 NOTE — DISCHARGE NOTE PROVIDER - NSDCACTIVITY_GEN_ALL_CORE
Patient called stating his left nostril started bleeding at 7am this morning. He saw Dr. Martinez on Wednesday and had that side cauterized. He is requesting to be seen today as he does not want to go back the ER. He has kleenex in the nostril currently and it is not flowing out. Message sent to Dr. Martinez.    Ermelinda Julian RN  [facility]  [phone]    
No restrictions/Return to Work/School allowed

## 2025-04-09 NOTE — PROGRESS NOTE ADULT - SUBJECTIVE AND OBJECTIVE BOX
Patient is a 59y old  Female who presents with a chief complaint of abdominal pain    INTERVAL HPI/OVERNIGHT EVENTS:  late this morning with 2 small BM.  abd pain was severe but improved with zanaflex and levsin. tolerating clears with no nausea.  no other complaints.     MEDICATIONS  (STANDING):  amLODIPine   Tablet 5 milliGRAM(s) Oral daily  enoxaparin Injectable 40 milliGRAM(s) SubCutaneous every 12 hours  folic acid Injectable 1 milliGRAM(s) IV Push daily  lactated ringers. 1000 milliLiter(s) (100 mL/Hr) IV Continuous <Continuous>  levothyroxine 175 MICROGram(s) Oral <User Schedule>  levothyroxine 200 MICROGram(s) Oral <User Schedule>  losartan 50 milliGRAM(s) Oral at bedtime  nicotine -   7 mG/24Hr(s) Patch 1 Patch Transdermal daily  nortriptyline 20 milliGRAM(s) Oral at bedtime  pantoprazole    Tablet 40 milliGRAM(s) Oral before breakfast  polyethylene glycol 3350 17 Gram(s) Oral two times a day  thiamine Injectable 100 milliGRAM(s) IV Push daily    MEDICATIONS  (PRN):  ALPRAZolam 0.5 milliGRAM(s) Oral every 8 hours PRN anxiety  bisacodyl Suppository 10 milliGRAM(s) Rectal daily PRN Constipation  hyoscyamine SL 0.125 milliGRAM(s) SubLingual every 4 hours PRN abdominal spasms  ketorolac   Injectable 15 milliGRAM(s) IV Push every 6 hours PRN Moderate Pain (4 - 6)  ondansetron Injectable 4 milliGRAM(s) IV Push every 6 hours PRN Nausea and/or Vomiting  tiZANidine 4 milliGRAM(s) Oral every 6 hours PRN Muscle Spasm  zolpidem 5 milliGRAM(s) Oral at bedtime PRN Insomnia  zolpidem 5 milliGRAM(s) Oral at bedtime PRN Insomnia      Allergies  topical corticosteroids (Rash)  chlorohexadine (Rash)      REVIEW OF SYSTEMS:  CONSTITUTIONAL: No fever, weight loss, or fatigue  EYES: No eye pain, visual disturbances, or discharge  ENMT:  No difficulty hearing, tinnitus, vertigo; No sinus or throat pain  NECK: No pain or stiffness  BREASTS: No pain, masses, or nipple discharge  RESPIRATORY: No cough, wheezing, chills or hemoptysis; No shortness of breath  CARDIOVASCULAR: No chest pain, palpitations, lightheadedness, or leg swelling  GASTROINTESTINAL: see hpi  GENITOURINARY: No dysuria, frequency, hematuria, or incontinence  NEUROLOGICAL: No headaches, memory loss, vertigo, loss of strength, numbness, or tremors  SKIN: No itching, burning, rashes, or lesions   LYMPH NODES: No enlarged glands  ENDOCRINE: No heat or cold intolerance; No hair loss; No polydipsia or polyuria  MUSCULOSKELETAL: No joint pain or swelling; No muscle, back, or extremity pain  PSYCHIATRIC: No depression, anxiety, or mood swings  HEME/LYMPH: No easy bruising, or bleeding gums  ALLERGY AND IMMUNOLOGIC: No hives or eczema    Vital Signs Last 24 Hrs  T(C): 36.6 (08 Apr 2025 07:36), Max: 36.8 (07 Apr 2025 15:16)  T(F): 97.9 (08 Apr 2025 07:36), Max: 98.2 (07 Apr 2025 15:16)  HR: 73 (08 Apr 2025 07:36) (60 - 84)  BP: 135/87 (08 Apr 2025 07:36) (122/80 - 157/83)  BP(mean): --  RR: 18 (08 Apr 2025 07:36) (18 - 18)  SpO2: 98% (08 Apr 2025 07:36) (98% - 100%)    Parameters below as of 08 Apr 2025 07:36  Patient On (Oxygen Delivery Method): room air        PHYSICAL EXAM:  GENERAL: NAD, well-groomed, well-developed  HEAD:  Atraumatic, Normocephalic  EYES:  conjunctiva and sclera clear  ENMT: Moist mucous membranes, Good dentition, No lesions; No tonsillar erythema, exudates, or enlargement  NECK: Supple, No JVD   NERVOUS SYSTEM:  Alert & Oriented X3, Good concentration; All 4 extremities mobile, no gross sensory deficits.   CHEST/LUNG: Clear to auscultation bilaterally;    HEART: Regular rate and rhythm;   ABDOMEN: Soft, + LLQ tenderness to deep palpation, improved since yesterday, Nondistended; Bowel sounds present  EXTREMITIES:  2+ Peripheral Pulses, No clubbing, cyanosis, or edema  LYMPH: No lymphadenopathy noted  SKIN: No rashes or lesions    LABS:                        14.0   6.88  )-----------( 264      ( 08 Apr 2025 06:00 )             41.0     08 Apr 2025 06:00    142    |  107    |  9      ----------------------------<  99     3.6     |  25     |  0.67     Ca    9.0        08 Apr 2025 06:00  Mg     2.0       08 Apr 2025 06:00    TPro  6.2    /  Alb  3.6    /  TBili  0.5    /  DBili  x      /  AST  15     /  ALT  21     /  AlkPhos  79     08 Apr 2025 06:00      CAPILLARY BLOOD GLUCOSE          RADIOLOGY & ADDITIONAL TESTS:    Imaging Personally Reviewed:  [x ] YES   < from: US Abdomen Complete (US Abdomen Complete .) (04.07.25 @ 17:14) >  IMPRESSION:  Distended gallbladder containing a mobile stone.   Dilated common bile duct to 12 mm without intrahepatic biliary ductal dilatation.  < end of copied text >      Consultant(s) Notes Reviewed:      Care Discussed with Consultants/Other Providers:  [facility], Dr Mondragon     Advanced Directives: [ ] DNR  [ ] No feeding tube  [ ] MOLST in chart  [ ] MOLST completed today  [ ] Unknown  
Patient is a 59y old  Female who presents with a chief complaint of abdominal pain (09 Apr 2025 09:03)      INTERVAL HPI/OVERNIGHT EVENTS:  pt with some residual pain in LLQ but much improved over admission.  pain is tolerable.  pt would like to go home and f/u as outpatient.   did have an orange and one piece of [place] toast for breakfast with no increase in pain.       MEDICATIONS  (STANDING):  amLODIPine   Tablet 5 milliGRAM(s) Oral daily  enoxaparin Injectable 40 milliGRAM(s) SubCutaneous every 12 hours  folic acid Injectable 1 milliGRAM(s) IV Push daily  levothyroxine 175 MICROGram(s) Oral <User Schedule>  levothyroxine 200 MICROGram(s) Oral <User Schedule>  losartan 50 milliGRAM(s) Oral at bedtime  nicotine -   7 mG/24Hr(s) Patch 1 Patch Transdermal daily  nortriptyline 20 milliGRAM(s) Oral at bedtime  pantoprazole    Tablet 40 milliGRAM(s) Oral before breakfast  polyethylene glycol 3350 17 Gram(s) Oral two times a day  thiamine Injectable 100 milliGRAM(s) IV Push daily    MEDICATIONS  (PRN):  acetaminophen     Tablet .. 1000 milliGRAM(s) Oral every 6 hours PRN Mild Pain (1 - 3)  ALPRAZolam 0.5 milliGRAM(s) Oral every 8 hours PRN anxiety  bisacodyl Suppository 10 milliGRAM(s) Rectal daily PRN Constipation  hyoscyamine SL 0.125 milliGRAM(s) SubLingual every 4 hours PRN abdominal spasms  ondansetron Injectable 4 milliGRAM(s) IV Push every 6 hours PRN Nausea and/or Vomiting  tiZANidine 4 milliGRAM(s) Oral every 6 hours PRN Muscle Spasm  zolpidem 5 milliGRAM(s) Oral at bedtime PRN Insomnia  zolpidem 5 milliGRAM(s) Oral at bedtime PRN Insomnia      Allergies  topical corticosteroids (Rash)  chlorohexadine (Rash)      REVIEW OF SYSTEMS:  CONSTITUTIONAL: No fever, weight loss, or fatigue  EYES: No eye pain, visual disturbances, or discharge  ENMT:  No difficulty hearing, tinnitus, vertigo; No sinus or throat pain  NECK: No pain or stiffness  BREASTS: No pain, masses, or nipple discharge  RESPIRATORY: No cough, wheezing, chills or hemoptysis; No shortness of breath  CARDIOVASCULAR: No chest pain, palpitations, lightheadedness, or leg swelling  GASTROINTESTINAL: see hpi  GENITOURINARY: No dysuria, frequency, hematuria, or incontinence  NEUROLOGICAL: No headaches, memory loss, vertigo, loss of strength, numbness, or tremors  SKIN: No itching, burning, rashes, or lesions   LYMPH NODES: No enlarged glands  ENDOCRINE: No heat or cold intolerance; No hair loss; No polydipsia or polyuria  MUSCULOSKELETAL: No joint pain or swelling; No muscle, back, or extremity pain  PSYCHIATRIC: No depression, anxiety, or mood swings  HEME/LYMPH: No easy bruising, or bleeding gums  ALLERGY AND IMMUNOLOGIC: No hives or eczema    Vital Signs Last 24 Hrs  T(C): 36.5 (09 Apr 2025 07:41), Max: 36.6 (08 Apr 2025 15:37)  T(F): 97.7 (09 Apr 2025 07:41), Max: 97.9 (08 Apr 2025 23:00)  HR: 68 (09 Apr 2025 07:41) (63 - 70)  BP: 142/82 (09 Apr 2025 07:41) (137/82 - 153/80)  BP(mean): --  RR: 18 (09 Apr 2025 07:41) (16 - 18)  SpO2: 97% (09 Apr 2025 07:41) (97% - 100%)    Parameters below as of 09 Apr 2025 07:41  Patient On (Oxygen Delivery Method): room air        PHYSICAL EXAM:  GENERAL: NAD, well-groomed, well-developed  HEAD:  Atraumatic, Normocephalic  EYES:   conjunctiva and sclera clear  ENMT: Moist mucous membranes   NECK: Supple, No JVD   NERVOUS SYSTEM:  Alert & Oriented X3, Good concentration; All 4 extremities mobile, no gross sensory deficits.   CHEST/LUNG: Clear to auscultation bilaterally   HEART: Regular rate and rhythm;    ABDOMEN: Soft, Nontender, Nondistended; Bowel sounds present  EXTREMITIES:  2+ Peripheral Pulses, No clubbing, cyanosis, or edema  LYMPH: No lymphadenopathy noted  SKIN: No rashes or lesions    LABS:                        12.8   7.31  )-----------( 243      ( 09 Apr 2025 06:30 )             37.6     09 Apr 2025 06:30    142    |  108    |  7      ----------------------------<  91     4.4     |  25     |  0.52     Ca    8.9        09 Apr 2025 06:30  Mg     2.0       09 Apr 2025 06:30    TPro  5.9    /  Alb  3.2    /  TBili  0.6    /  DBili  x      /  AST  18     /  ALT  21     /  AlkPhos  73     09 Apr 2025 06:30      Urinalysis Basic - ( 09 Apr 2025 06:30 )    Color: x / Appearance: x / SG: x / pH: x  Gluc: 91 mg/dL / Ketone: x  / Bili: x / Urobili: x   Blood: x / Protein: x / Nitrite: x   Leuk Esterase: x / RBC: x / WBC x   Sq Epi: x / Non Sq Epi: x / Bacteria: x      CAPILLARY BLOOD GLUCOSE          RADIOLOGY & ADDITIONAL TESTS:    Imaging Personally Reviewed:  [ ] YES     Consultant(s) Notes Reviewed:      Care Discussed with Consultants/Other Providers:    Advanced Directives: [ ] DNR  [ ] No feeding tube  [ ] MOLST in chart  [ ] MOLST completed today  [ ] Unknown  
SIMIN DIA 59y Female  MRN-[id number]    BARIATRIC SURGERY / DR. VERNON     PATIENT SEEN AND EXAMINED AT BEDSIDE. SHE IS TOLERATING FULL LIQUID DIET, DENIES NAUSEA/ VOMITING. SHE IS PASSING FLATUS, VOIDING GOOD AMOUNT OF URINE AND HAD MULTIPLE SOLID BM. SHE DENIES ANY URINARY SYMPTOMS.  LLQ ABDOMINAL DISCOMFORT MUCH BETTER.      MEDICATIONS  (STANDING):  amLODIPine   Tablet 5 milliGRAM(s) Oral daily  enoxaparin Injectable 40 milliGRAM(s) SubCutaneous every 12 hours  folic acid Injectable 1 milliGRAM(s) IV Push daily  levothyroxine 175 MICROGram(s) Oral <User Schedule>  levothyroxine 200 MICROGram(s) Oral <User Schedule>  losartan 50 milliGRAM(s) Oral at bedtime  nicotine -   7 mG/24Hr(s) Patch 1 Patch Transdermal daily  nortriptyline 20 milliGRAM(s) Oral at bedtime  pantoprazole    Tablet 40 milliGRAM(s) Oral before breakfast  polyethylene glycol 3350 17 Gram(s) Oral two times a day  sodium chloride 0.9% 1000 milliLiter(s) IV Continuous   thiamine Injectable 100 milliGRAM(s) IV Push daily    MEDICATIONS  (PRN):  acetaminophen     Tablet .. 1000 milliGRAM(s) Oral every 6 hours PRN Mild Pain (1 - 3)  ALPRAZolam 0.5 milliGRAM(s) Oral every 8 hours PRN anxiety  bisacodyl Suppository 10 milliGRAM(s) Rectal daily PRN Constipation  hyoscyamine SL 0.125 milliGRAM(s) SubLingual every 4 hours PRN abdominal spasms  ketorolac   Injectable 15 milliGRAM(s) IV Push every 6 hours PRN Moderate Pain (4 - 6)  ondansetron Injectable 4 milliGRAM(s) IV Push every 6 hours PRN Nausea and/or Vomiting  tiZANidine 4 milliGRAM(s) Oral every 6 hours PRN Muscle Spasm  zolpidem 5 milliGRAM(s) Oral at bedtime PRN Insomnia  zolpidem 5 milliGRAM(s) Oral at bedtime PRN Insomnia    Vital Signs (24 Hrs):  T(C): 36.5 (04-09-25 @ 07:41), Max: 36.6 (04-08-25 @ 15:37)  HR: 68 (04-09-25 @ 07:41) (63 - 70)  BP: 142/82 (04-09-25 @ 07:41) (137/82 - 153/80)  RR: 18 (04-09-25 @ 07:41) (16 - 18)  SpO2: 97% (04-09-25 @ 07:41) (97% - 100%)    I&O's Summary    08 Apr 2025 07:01  -  09 Apr 2025 07:00  --------------------------------------------------------  IN: 2 mL / OUT: 0 mL / NET: 2 mL    PHYSICAL EXAM:    GENERAL: NAD  EYES: SCLERA ANICTERIC   CHEST/LUNG: CLEAR TO AUSCULTATION, NO W/R/R  HEART: REGULAR RATE, RHYTHM, NO MURMUR, RUBS, GALLOPS  ABDOMEN: OBESE, WELL HEALED MULTIPLE TROCAR  SITES, SMALL NON TENDER UMBILICAL HERNIA. + BS, SOFT, NOT DISTENDED, NO PALPABLE MASS, SOME  LLQ DISCOMFORT ON PALPATION WITHOUT REBOUND, NO GUARDING/ RIGIDITY. NO CVA  TENDERNESS   EXTREMITIES:  NO CLUBBING, CYANOSIS, OR EDEMA.   NERVOUS SYSTEM: ALERT AND ORIENTED X3, CLEAR SPEECH . NO DEFICITS   SKIN: NON JAUNDICE                        12.8   7.31  )-----------( 243      ( 09 Apr 2025 06:30 )             37.6     09 Apr 2025 06:30    142    |  108    |  7      ----------------------------<  91     4.4     |  25     |  0.52     Ca    8.9        09 Apr 2025 06:30  Mg     2.0       09 Apr 2025 06:30    TPro  5.9    /  Alb  3.2    /  TBili  0.6    /  DBili  x      /  AST  18     /  ALT  21     /  AlkPhos  73     09 Apr 2025 06:30    ASSESSMENT AND PLAN     LLQ ABDOMINAL PAIN IMPROVING  CONSTIPATION IMPROVING   DISTENDED GALLBLADDER WITH CBD DILATATION ON US- REMAINS ASYMPTOMATIC, NORMAL WBC, LFTs AND NO RUQ, EPIGASTRIC ABDOMINAL PAIN/ TENDERNESS   H/O LAPAROSCOPIC GASTRIC SLEEVE  2017, ON ZEPBOUND  HISTORY OF HTN, HYPOTHYROIDISM, BILAT LOWER EXT NEUROPATHY, ANXIETY, OA     ADVANCE DIET TO REGULAR BARIATRIC, NO CARBONATED BEVERAGE- OBSERVE FOR DIET TOLERANCE    CONTINUE DAILY VITAMINS  HOLD ZEPBOND  AVOID NARCOTICS  ENCOURAGE AMBULATION   BOWEL REGIMEN AS PER GI/ MEDICINE   GB WORK UP AS OUT PATIENT, NO PLAN FOR CHOLECYSTECTOMY ON THIS ADMISSION, NEEDS MRCP AS OUT PATIENT  MEDICAL AND GI FOLLOW UP NOTED AND APPRECIATED   MEDICAL MANAGEMENT AS PER PRIMARY TEAM  D/C PLAN   SURGICAL TEAM WILL CONTINUE TO FOLLOW UP             
SIMIN DIA 59y Female  MRN-[id number]    BARIATRIC SURGERY / DR. VERNON     PATIENT SEEN AND EXAMINED AT BEDSIDE.  SHE IS TOLERATING CLEAR LIQUID DIET , DENIES ANY NAUSEA/ VOMITING. SHE IS PASSING FLATUS, VOIDING GOOD AMOUNT OF URINE, NO BM YET.  SHE HAS LLQ ABDOMINAL DISCOMFORT.     MEDICATIONS  (STANDING):  acetaminophen   IVPB .. 1000 milliGRAM(s) IV Intermittent every 6 hours  amLODIPine   Tablet 5 milliGRAM(s) Oral daily  enoxaparin Injectable 40 milliGRAM(s) SubCutaneous every 12 hours  lactated ringers. 1000 milliLiter(s) (100 mL/Hr) IV Continuous <Continuous>  levothyroxine 175 MICROGram(s) Oral <User Schedule>  levothyroxine 200 MICROGram(s) Oral <User Schedule>  losartan 50 milliGRAM(s) Oral at bedtime  nicotine -   7 mG/24Hr(s) Patch 1 Patch Transdermal daily  nortriptyline 20 milliGRAM(s) Oral at bedtime  pantoprazole    Tablet 40 milliGRAM(s) Oral before breakfast  polyethylene glycol 3350 17 Gram(s) Oral two times a day    MEDICATIONS  (PRN):  ALPRAZolam 0.5 milliGRAM(s) Oral every 8 hours PRN anxiety  bisacodyl Suppository 10 milliGRAM(s) Rectal daily PRN Constipation  ketorolac   Injectable 15 milliGRAM(s) IV Push every 6 hours PRN Moderate Pain (4 - 6)  ondansetron Injectable 4 milliGRAM(s) IV Push every 6 hours PRN Nausea and/or Vomiting  tiZANidine 2 milliGRAM(s) Oral every 6 hours PRN Muscle Spasm  zolpidem 5 milliGRAM(s) Oral at bedtime PRN Insomnia  zolpidem 5 milliGRAM(s) Oral at bedtime PRN Insomnia    Vital Signs (24 Hrs):  T(C): 36.6 (04-08-25 @ 07:36), Max: 36.9 (04-07-25 @ 10:31)  HR: 73 (04-08-25 @ 07:36) (60 - 93)  BP: 135/87 (04-08-25 @ 07:36) (115/74 - 157/83)  RR: 18 (04-08-25 @ 07:36) (18 - 20)  SpO2: 98% (04-08-25 @ 07:36) (98% - 100%)    I&O's Summary    07 Apr 2025 07:01  -  08 Apr 2025 07:00  --------------------------------------------------------  IN: 900 mL / OUT: 350 mL / NET: 550 mL    PHYSICAL EXAM:    GENERAL: NAD  EYES: SCLERA ANICTERIC   CHEST/LUNG: CLEAR TO AUSCULTATION, NO W/R/R  HEART: REGULAR RATE, RHYTHM, NO MURMUR, RUBS, GALLOPS  ABDOMEN: OBESE, WELL HEALED MULTIPLE TROCAR  SITES, SMALL NON TENDER UMBILICAL HERNIA. + BS, SOFT, NOT DISTENDED, NO PALPABLE MASS, LLQ TENDERNESS WITHOUT REBOUND, NO GUARDING/ RIGIDITY. NO CVA  TENDERNESS   EXTREMITIES: LARGE TRANSVERSE RIGHT UPPER LEG, RIGHT WRIST AND RIGHT HIP SCAR. 2+ PERIPHERAL PULSES, BRISK CAPILLARY REFILL. NO CLUBBING, CYANOSIS, OR EDEMA.   NERVOUS SYSTEM: ALERT AND ORIENTED X3, CLEAR SPEECH . NO DEFICITS   MUSCULOSKELETAL : GOOD ROM ALL 4 EXTREMITIES, FULL AND EQUAL STRENGTH   SKIN: NON JAUNDICE                   14.0   6.88  )-----------( 264      ( 08 Apr 2025 06:00 )             41.0     08 Apr 2025 06:00    142    |  107    |  9      ----------------------------<  99     3.6     |  25     |  0.67     Ca    9.0        08 Apr 2025 06:00  Mg     2.0       08 Apr 2025 06:00    TPro  6.2    /  Alb  3.6    /  TBili  0.5    /  DBili  x      /  AST  15     /  ALT  21     /  AlkPhos  79     08 Apr 2025 06:00    ACC: [id number] EXAM:  US ABDOMEN COMPLETE   ORDERED BY: UGO HUNG   PROCEDURE DATE:  04/07/2025      INTERPRETATION:  CLINICAL INFORMATION: Abdominal pain. Abnormal   gallbladder on CT.    COMPARISON: 4/7/2025 CT scan of the abdomen and pelvis    TECHNIQUE: Sonography of the abdomen.    FINDINGS:  Liver: Within normal limits.  Bile ducts: No intrahepatic biliary ductal dilatation. Common bile duct   is dilated to 12 mm.  Gallbladder: Distended containing a mobile gallstone. Gallbladder wall   thickness measures 2.6 mm and is within normal limits. Assessment for   ultrasonographic Toney sign could not be performed as patient is on pain   medication.  Pancreas: Visualized portions are within normal limits.  Spleen: 9.4 cm. Within normal limits.  Right kidney: 10.1 cm. No hydronephrosis. Fullness of the renal pelvis.  Left kidney: 10.2 cm. No hydronephrosis. There is a 1.1 cm lower pole   cyst.  Ascites: None.  Aorta and IVC: Visualized portions are within normal limits.    IMPRESSION:  Distended gallbladder containing a mobile stone.    Dilated common bile duct to 12 mm without intrahepatic biliary ductal   dilatation.    SHER GARZA MD; Attending Radiologist  This document has been electronically signed. Apr 7 2025  5:32PM    ASSESSMENT AND PLAN     LLQ ABDOMINAL PAIN  CONSTIPATION   DISTENDED GALLBLADDER WITH CBD DILATATION ON US- ASYMPTOMATIC, NORMAL WBC, LFTs AND NO RUQ, EPIGASTRIC ABDOMINAL PAIN/ TENDERNESS   H/O LAPAROSCOPIC GASTRIC SLEEVE  2017, ON ZEPBOUND  HISTORY OF HTN, HYPOTHYROIDISM, BILAT LOWER EXT NEUROPATHY, ANXIETY, OA     ADVANCE DIET AS TOLERATED   CONTINUE HYDRATION  DAILY VITAMINS  AVOID NARCOTICS  ENCOURAGE AMBULATION   RECOMMEND SMOG ENEMA- GI DR. GALEANO IN AGREEMENT & HE WILL ORDER   GB WORK UP AS OUT PATIENT, NO PLAN FOR CHOLECYSTECTOMY UNTIL CLEAR BY GI AND LLQ PAIN AND CONSTIPATION RESOLVES - GI DR. OROZCO IN AGREEMENT  MEDICAL MANAGEMENT AS PER PRIMARY TEAM  SURGICAL TEAM WILL CONTINUE TO FOLLOW UP                              
[facility]  Nico Valencia PA-C  [address] [place] [address]  [phone]      INTERVAL HPI/OVERNIGHT EVENTS: no acute events    MEDICATIONS  (STANDING):  acetaminophen   IVPB .. 1000 milliGRAM(s) IV Intermittent every 6 hours  amLODIPine   Tablet 5 milliGRAM(s) Oral daily  enoxaparin Injectable 40 milliGRAM(s) SubCutaneous every 12 hours  folic acid Injectable 1 milliGRAM(s) IV Push daily  lactated ringers. 1000 milliLiter(s) (100 mL/Hr) IV Continuous <Continuous>  levothyroxine 175 MICROGram(s) Oral <User Schedule>  levothyroxine 200 MICROGram(s) Oral <User Schedule>  losartan 50 milliGRAM(s) Oral at bedtime  nicotine -   7 mG/24Hr(s) Patch 1 Patch Transdermal daily  nortriptyline 20 milliGRAM(s) Oral at bedtime  pantoprazole    Tablet 40 milliGRAM(s) Oral before breakfast  polyethylene glycol 3350 17 Gram(s) Oral two times a day  sodium chloride 0.9% 1000 milliLiter(s) IV Continuous   thiamine Injectable 100 milliGRAM(s) IV Push daily    MEDICATIONS  (PRN):  ALPRAZolam 0.5 milliGRAM(s) Oral every 8 hours PRN anxiety  bisacodyl Suppository 10 milliGRAM(s) Rectal daily PRN Constipation  ketorolac   Injectable 15 milliGRAM(s) IV Push every 6 hours PRN Moderate Pain (4 - 6)  ondansetron Injectable 4 milliGRAM(s) IV Push every 6 hours PRN Nausea and/or Vomiting  tiZANidine 2 milliGRAM(s) Oral every 6 hours PRN Muscle Spasm  zolpidem 5 milliGRAM(s) Oral at bedtime PRN Insomnia  zolpidem 5 milliGRAM(s) Oral at bedtime PRN Insomnia      Allergies    topical corticosteroids (Rash)  chlorohexadine (Rash)    Intolerances        ROS:   General:  No  fevers, chills, night sweats, fatigue,   Eyes:  Good vision, no reported pain  ENT:  No sore throat, pain, runny nose, dysphagia  CV:  No pain, palpitations, hypo/hypertension  Resp:  No dyspnea, cough, tachypnea, wheezing  GI:  No pain, No nausea, No vomiting, No diarrhea, No constipation, No weight loss, No fever, No pruritis, No rectal bleeding, No tarry stools, No dysphagia,  :  No pain, bleeding, incontinence, nocturia  Muscle:  No pain, weakness  Neuro:  No weakness, tingling, memory problems  Psych:  No fatigue, insomnia, mood problems, depression  Endocrine:  No polyuria, polydipsia, cold/heat intolerance  Heme:  No petechiae, ecchymosis, easy bruisability  Skin:  No rash, tattoos, scars, edema      PHYSICAL EXAM:   Vital Signs:  Vital Signs Last 24 Hrs  T(C): 36.6 (08 Apr 2025 07:36), Max: 36.9 (07 Apr 2025 10:31)  T(F): 97.9 (08 Apr 2025 07:36), Max: 98.5 (07 Apr 2025 10:31)  HR: 73 (08 Apr 2025 07:36) (60 - 84)  BP: 135/87 (08 Apr 2025 07:36) (122/80 - 157/83)  BP(mean): --  RR: 18 (08 Apr 2025 07:36) (18 - 18)  SpO2: 98% (08 Apr 2025 07:36) (98% - 100%)    Parameters below as of 08 Apr 2025 07:36  Patient On (Oxygen Delivery Method): room air      Daily     Daily     GENERAL:  Appears stated age,   HEENT:  NC/AT,    CHEST:  Full & symmetric excursion,   HEART:  Regular rhythm,  ABDOMEN:  Soft, non-tender, non-distended,  EXTEREMITIES:  no cyanosis  SKIN:  No rash  NEURO:  Alert,       LABS:                        14.0   6.88  )-----------( 264      ( 08 Apr 2025 06:00 )             41.0     04-08    142  |  107  |  9   ----------------------------<  99  3.6   |  25  |  0.67    Ca    9.0      08 Apr 2025 06:00  Mg     2.0     04-08    TPro  6.2  /  Alb  3.6  /  TBili  0.5  /  DBili  x   /  AST  15  /  ALT  21  /  AlkPhos  79  04-08      Urinalysis Basic - ( 08 Apr 2025 06:00 )    Color: x / Appearance: x / SG: x / pH: x  Gluc: 99 mg/dL / Ketone: x  / Bili: x / Urobili: x   Blood: x / Protein: x / Nitrite: x   Leuk Esterase: x / RBC: x / WBC x   Sq Epi: x / Non Sq Epi: x / Bacteria: x        RADIOLOGY & ADDITIONAL TESTS:  
[facility]  Nico Valencia PA-C  [address] [place] [address]  [phone]      INTERVAL HPI/OVERNIGHT EVENTS: no acute events    MEDICATIONS  (STANDING):  acetaminophen   IVPB .. 1000 milliGRAM(s) IV Intermittent every 6 hours  amLODIPine   Tablet 5 milliGRAM(s) Oral daily  enoxaparin Injectable 40 milliGRAM(s) SubCutaneous every 12 hours  folic acid Injectable 1 milliGRAM(s) IV Push daily  lactated ringers. 1000 milliLiter(s) (100 mL/Hr) IV Continuous <Continuous>  levothyroxine 175 MICROGram(s) Oral <User Schedule>  levothyroxine 200 MICROGram(s) Oral <User Schedule>  losartan 50 milliGRAM(s) Oral at bedtime  nicotine -   7 mG/24Hr(s) Patch 1 Patch Transdermal daily  nortriptyline 20 milliGRAM(s) Oral at bedtime  pantoprazole    Tablet 40 milliGRAM(s) Oral before breakfast  polyethylene glycol 3350 17 Gram(s) Oral two times a day  sodium chloride 0.9% 1000 milliLiter(s) IV Continuous   thiamine Injectable 100 milliGRAM(s) IV Push daily    MEDICATIONS  (PRN):  ALPRAZolam 0.5 milliGRAM(s) Oral every 8 hours PRN anxiety  bisacodyl Suppository 10 milliGRAM(s) Rectal daily PRN Constipation  ketorolac   Injectable 15 milliGRAM(s) IV Push every 6 hours PRN Moderate Pain (4 - 6)  ondansetron Injectable 4 milliGRAM(s) IV Push every 6 hours PRN Nausea and/or Vomiting  tiZANidine 2 milliGRAM(s) Oral every 6 hours PRN Muscle Spasm  zolpidem 5 milliGRAM(s) Oral at bedtime PRN Insomnia  zolpidem 5 milliGRAM(s) Oral at bedtime PRN Insomnia      Allergies    topical corticosteroids (Rash)  chlorohexadine (Rash)    Intolerances        ROS:   General:  No  fevers, chills, night sweats, fatigue,   Eyes:  Good vision, no reported pain  ENT:  No sore throat, pain, runny nose, dysphagia  CV:  No pain, palpitations, hypo/hypertension  Resp:  No dyspnea, cough, tachypnea, wheezing  GI:  No pain, No nausea, No vomiting, No diarrhea, No constipation, No weight loss, No fever, No pruritis, No rectal bleeding, No tarry stools, No dysphagia,  :  No pain, bleeding, incontinence, nocturia  Muscle:  No pain, weakness  Neuro:  No weakness, tingling, memory problems  Psych:  No fatigue, insomnia, mood problems, depression  Endocrine:  No polyuria, polydipsia, cold/heat intolerance  Heme:  No petechiae, ecchymosis, easy bruisability  Skin:  No rash, tattoos, scars, edema      PHYSICAL EXAM:   Vital Signs:  Vital Signs Last 24 Hrs  T(C): 36.6 (08 Apr 2025 07:36), Max: 36.9 (07 Apr 2025 10:31)  T(F): 97.9 (08 Apr 2025 07:36), Max: 98.5 (07 Apr 2025 10:31)  HR: 73 (08 Apr 2025 07:36) (60 - 84)  BP: 135/87 (08 Apr 2025 07:36) (122/80 - 157/83)  BP(mean): --  RR: 18 (08 Apr 2025 07:36) (18 - 18)  SpO2: 98% (08 Apr 2025 07:36) (98% - 100%)    Parameters below as of 08 Apr 2025 07:36  Patient On (Oxygen Delivery Method): room air      Daily     Daily     GENERAL:  Appears stated age,   HEENT:  NC/AT,    CHEST:  Full & symmetric excursion,   HEART:  Regular rhythm,  ABDOMEN:  Soft, non-tender, non-distended,  EXTEREMITIES:  no cyanosis  SKIN:  No rash  NEURO:  Alert,       LABS:                        14.0   6.88  )-----------( 264      ( 08 Apr 2025 06:00 )             41.0     04-08    142  |  107  |  9   ----------------------------<  99  3.6   |  25  |  0.67    Ca    9.0      08 Apr 2025 06:00  Mg     2.0     04-08    TPro  6.2  /  Alb  3.6  /  TBili  0.5  /  DBili  x   /  AST  15  /  ALT  21  /  AlkPhos  79  04-08      Urinalysis Basic - ( 08 Apr 2025 06:00 )    Color: x / Appearance: x / SG: x / pH: x  Gluc: 99 mg/dL / Ketone: x  / Bili: x / Urobili: x   Blood: x / Protein: x / Nitrite: x   Leuk Esterase: x / RBC: x / WBC x   Sq Epi: x / Non Sq Epi: x / Bacteria: x        RADIOLOGY & ADDITIONAL TESTS:

## 2025-04-09 NOTE — DISCHARGE NOTE NURSING/CASE MANAGEMENT/SOCIAL WORK - NSDCPEEMAIL_GEN_ALL_CORE
[facility] for Tobacco Control email [email or web address]

## 2025-04-09 NOTE — DISCHARGE NOTE NURSING/CASE MANAGEMENT/SOCIAL WORK - PATIENT PORTAL LINK FT
You can access the FollowMyHealth Patient Portal offered by [facility] by registering at the following website: [email or web address]. By joining [employer]’s FollowMyHealth portal, you will also be able to view your health information using other applications (apps) compatible with our system.

## 2025-04-09 NOTE — DISCHARGE NOTE PROVIDER - CARE PROVIDER_API CALL
Clifton Jerome  Internal Medicine  [address], [address]  [place] [address]  Phone: [phone]  Fax: [phone]  Follow Up Time:    Clifton Jerome  Internal Medicine  [address], [address]  [place] [address]  Phone: [phone]  Fax: [phone]  Follow Up Time:     Agata Rivera  Surgery  [address] [place] [address], [address] [place] [address]  Phone: [phone]  Fax: [phone]  Follow Up Time:     Dustin Cooper  Gastroenterology  [address] [place] [address]  Phone: [phone]  Fax: [phone]  Follow Up Time:

## 2025-04-09 NOTE — PROGRESS NOTE ADULT - TIME BILLING
Agree with above.  Patient seen and examined.  Feels better still mild LLQ pain but much improved.  Tolerating clears, good urine output, ambulating. Had BM. Advance to full liquids. Probable discharge tomorrow.  Will need MRCP as outpatient to evaluated dilated CBD. Hold Zepbound for now.
Agree with above.  Patient seen and examined.  Feels better, had BMs. Tolerating diet, good urine output, ambulating.  Probable discharge home later today.  Follow up in the office in 2 weeks, call with any concerns.  Will need MRCP.

## 2025-04-09 NOTE — DISCHARGE NOTE PROVIDER - CARE PROVIDERS DIRECT ADDRESSES
[email or web address]

## 2025-04-09 NOTE — PROGRESS NOTE ADULT - ASSESSMENT
59F HTN, Obesity s/p lap sleeve gastrectomy, hypothyroid, neuropathy, OA right hip s/p THR, chronic back pain s/p laminectomy, presented with 4 days of LLQ abd pain of unclear etiology    LLQ abd pain  - d/w surgery and GI  - pt with copies of results from [facility] - no significant findings on 2 CT scans, abd and pelvic US  - continue bowel regimen and hydration  - Pain medication - tylenol, dc IV toradol; avoid opioids as may worsen constipation  - intra-abdominal muscle spasms? continue Zanaflex and Levsin prn  - continue PPI    Distended GB & CBD on imaging  - does not appear to be source of pain  - reviewed US  - outpatient work up    HTN  - continue Norvasc and Losartan with hold parameters    Hypothyroid  - continue alternating doses of 175 mcg & 200 mcg QHS    Anxiety  - continue nortriptyline QHS  - continue xanax prn    Smoker < 1/2 ppd  - continue nicotine patch    DVT proph  - Lovenox    Dr Knox (PMD) aware of admission

## 2025-04-14 ENCOUNTER — NON-APPOINTMENT (OUTPATIENT)
Age: 60
End: 2025-04-14

## 2025-04-21 ENCOUNTER — NON-APPOINTMENT (OUTPATIENT)
Age: 60
End: 2025-04-21

## 2025-04-23 ENCOUNTER — APPOINTMENT (OUTPATIENT)
Dept: BARIATRICS | Facility: CLINIC | Age: 60
End: 2025-04-23

## 2025-04-23 VITALS
HEIGHT: 66 IN | HEART RATE: 87 BPM | OXYGEN SATURATION: 99 % | BODY MASS INDEX: 38.97 KG/M2 | WEIGHT: 242.5 LBS | DIASTOLIC BLOOD PRESSURE: 78 MMHG | TEMPERATURE: 97.2 F | SYSTOLIC BLOOD PRESSURE: 130 MMHG

## 2025-04-23 DIAGNOSIS — Z76.0 ENCOUNTER FOR ISSUE OF REPEAT PRESCRIPTION: ICD-10-CM

## 2025-04-23 DIAGNOSIS — E66.01 MORBID (SEVERE) OBESITY DUE TO EXCESS CALORIES: ICD-10-CM

## 2025-04-23 PROCEDURE — G2211 COMPLEX E/M VISIT ADD ON: CPT | Mod: NC

## 2025-04-23 PROCEDURE — 99214 OFFICE O/P EST MOD 30 MIN: CPT

## 2025-04-23 RX ORDER — ELECTROLYTES/DEXTROSE
SOLUTION, ORAL ORAL
Refills: 0 | Status: ACTIVE | COMMUNITY

## 2025-04-23 RX ORDER — POLYETHYLENE GLYCOL 3350 17 G/17G
17 POWDER, FOR SOLUTION ORAL DAILY
Qty: 1 | Refills: 2 | Status: ACTIVE | COMMUNITY
Start: 2025-04-23 | End: 1900-01-01

## 2025-04-23 RX ORDER — FOLIC ACID 20 MG
CAPSULE ORAL
Refills: 0 | Status: ACTIVE | COMMUNITY

## 2025-04-23 RX ORDER — TIRZEPATIDE 5 MG/.5ML
5 INJECTION, SOLUTION SUBCUTANEOUS
Qty: 1 | Refills: 0 | Status: ACTIVE | COMMUNITY
Start: 2025-04-23 | End: 1900-01-01

## 2025-04-24 NOTE — ED ADULT TRIAGE NOTE - NS ED TRIAGE AVPU SCALE
PATIENT AWARE AND WILL GET LABS DRAWN TODAY. STAT CBC HAS BEEN ORDERED   Alert-The patient is alert, awake and responds to voice. The patient is oriented to time, place, and person. The triage nurse is able to obtain subjective information.

## 2025-05-06 ENCOUNTER — OUTPATIENT (OUTPATIENT)
Dept: OUTPATIENT SERVICES | Facility: HOSPITAL | Age: 60
LOS: 1 days | End: 2025-05-06
Payer: COMMERCIAL

## 2025-05-06 VITALS
WEIGHT: 242.95 LBS | OXYGEN SATURATION: 99 % | RESPIRATION RATE: 14 BRPM | SYSTOLIC BLOOD PRESSURE: 130 MMHG | DIASTOLIC BLOOD PRESSURE: 70 MMHG | HEART RATE: 97 BPM | HEIGHT: 65 IN | TEMPERATURE: 98 F

## 2025-05-06 DIAGNOSIS — Z90.3 ACQUIRED ABSENCE OF STOMACH [PART OF]: Chronic | ICD-10-CM

## 2025-05-06 DIAGNOSIS — Z96.641 PRESENCE OF RIGHT ARTIFICIAL HIP JOINT: Chronic | ICD-10-CM

## 2025-05-06 DIAGNOSIS — Z98.890 OTHER SPECIFIED POSTPROCEDURAL STATES: Chronic | ICD-10-CM

## 2025-05-06 DIAGNOSIS — G56.21 LESION OF ULNAR NERVE, RIGHT UPPER LIMB: ICD-10-CM

## 2025-05-06 DIAGNOSIS — G56.01 CARPAL TUNNEL SYNDROME, RIGHT UPPER LIMB: ICD-10-CM

## 2025-05-06 DIAGNOSIS — S52.571D OTHER INTRAARTICULAR FRACTURE OF LOWER END OF RIGHT RADIUS, SUBSEQUENT ENCOUNTER FOR CLOSED FRACTURE WITH ROUTINE HEALING: ICD-10-CM

## 2025-05-06 DIAGNOSIS — S62.101A FRACTURE OF UNSPECIFIED CARPAL BONE, RIGHT WRIST, INITIAL ENCOUNTER FOR CLOSED FRACTURE: Chronic | ICD-10-CM

## 2025-05-06 DIAGNOSIS — M65.311 TRIGGER THUMB, RIGHT THUMB: ICD-10-CM

## 2025-05-06 DIAGNOSIS — Z01.818 ENCOUNTER FOR OTHER PREPROCEDURAL EXAMINATION: ICD-10-CM

## 2025-05-06 DIAGNOSIS — M65.341 TRIGGER FINGER, RIGHT RING FINGER: ICD-10-CM

## 2025-05-06 DIAGNOSIS — M25.531 PAIN IN RIGHT WRIST: ICD-10-CM

## 2025-05-06 DIAGNOSIS — R10.9 UNSPECIFIED ABDOMINAL PAIN: ICD-10-CM

## 2025-05-06 DIAGNOSIS — Z96.9 PRESENCE OF FUNCTIONAL IMPLANT, UNSPECIFIED: ICD-10-CM

## 2025-05-06 DIAGNOSIS — Z90.89 ACQUIRED ABSENCE OF OTHER ORGANS: Chronic | ICD-10-CM

## 2025-05-06 PROBLEM — T14.8XXA OTHER INJURY OF UNSPECIFIED BODY REGION, INITIAL ENCOUNTER: Chronic | Status: INACTIVE | Noted: 2017-11-01 | Resolved: 2025-05-06

## 2025-05-06 PROBLEM — J06.9 ACUTE UPPER RESPIRATORY INFECTION, UNSPECIFIED: Chronic | Status: INACTIVE | Noted: 2017-11-01 | Resolved: 2025-05-06

## 2025-05-06 PROBLEM — M19.90 UNSPECIFIED OSTEOARTHRITIS, UNSPECIFIED SITE: Chronic | Status: INACTIVE | Noted: 2017-11-01 | Resolved: 2025-05-06

## 2025-05-06 PROCEDURE — G0463: CPT

## 2025-05-06 NOTE — H&P PST ADULT - REASON FOR ADMISSION
"I have fracture on my  right wrist" "Right wrist removal of hardware, and right carpal tunnel and trigger release "

## 2025-05-06 NOTE — H&P PST ADULT - ASSESSMENT
57 y/o female with  59 y/o female with right carpal tunnel syndrome, right wrist painful hardware, right thumb and ring finger trigger finger

## 2025-05-06 NOTE — H&P PST ADULT - NSICDXPASTMEDICALHX_GEN_ALL_CORE_FT
PAST MEDICAL HISTORY:  Anxiety     Cervical disc herniation unsure level    Goiter     Hypertension     Hypothyroid     Lumbar disc herniation L4-5, treated with epidural injections with Dr. Stephenson. Last done in 2016    Mononucleosis age 10    Morbid obesity     Neuropathy bilateral lower extremities    Osteoarthritis     Peripheral neuropathy     Sedentary lifestyle      PAST MEDICAL HISTORY:  Anxiety     Cervical disc herniation unsure level    Cubital tunnel syndrome, right     Current every day smoker     Goiter     Hypertension     Hypothyroid     Lumbar disc herniation L4-5, treated with epidural injections with Dr. Stephenson. Last done in 2016    Mononucleosis age 10    Morbid obesity     Neuropathy bilateral lower extremities    Painful orthopaedic hardware     Peripheral neuropathy     Right carpal tunnel syndrome     Sedentary lifestyle     Trigger thumb, right thumb

## 2025-05-06 NOTE — H&P PST ADULT - NSICDXPASTSURGICALHX_GEN_ALL_CORE_FT
PAST SURGICAL HISTORY:  H/O gastric sleeve     Right wrist fracture     S/P hip replacement, right     S/P laminectomy T11-12, 2010    S/P T&A (status post tonsillectomy and adenoidectomy) age 19    S/P tendon repair right lower leg 25 years ago

## 2025-05-06 NOTE — H&P PST ADULT - GASTROINTESTINAL
soft/nondistended details… soft/nondistended/normal active bowel sounds soft/nontender/nondistended/normal active bowel sounds

## 2025-05-06 NOTE — H&P PST ADULT - HISTORY OF PRESENT ILLNESS
59 y/o female present with right radius fracture. Patient states that she fell at work on 05/02. She complains 10/10 pain. She takes percocet for pain with some relief. She has a hard cast in place. Patient is scheduled for  ORIF of displaced INTR  Articular right radius fracture and right carpal tunnel release on 05/09/2024 60 y/o female presents with right carpal tunnel and right cubital tunnel syndrome. Reports right wrist pain, numbness, tingling especially in ring and pinky finger. Also  complaint of pain and locking of right thumb and ring finger .c/o right wrist painful hardware s/p ORIF 2024 , scheduled for right ulnar nerve release, the cubital tunnel ,right carpal tunnel release , right wrist removal of hardware , right thumb, ring finger trigger release on 5/22?25

## 2025-05-06 NOTE — H&P PST ADULT - BSA (M2)
Health Maintenance Summary     Topic Due On Due Status Completed On    Colorectal Cancer Screening - Colonoscopy Oct 1, 2023 Not Due Oct 1, 2013    IMMUNIZATION - DTaP/Tdap/Td Jun 27, 1981 Overdue     Immunization-Influenza Sep 1, 2017 Not Due           Patient is due for topics as listed above, he wishes to decline at this time .       2.15

## 2025-05-06 NOTE — H&P PST ADULT - NSICDXPROCEDURE_GEN_ALL_CORE_FT
PROCEDURES:  Right cubital tunnel release 06-May-2025 08:38:47  Nola Pinto   PROCEDURES:  Right cubital tunnel release 06-May-2025 08:38:47 right carpal tunnel release right wrist , removal of palte and screws, tright thumb and ring finger trigger release on 5/22/25 Nola Pinto   PROCEDURES:  Right cubital tunnel release 06-May-2025 08:38:47 right carpal tunnel release right wrist , removal of plate  and screws, tright thumb and ring finger trigger release on 5/22/25 Nola Pinto

## 2025-05-06 NOTE — H&P PST ADULT - PROBLEM SELECTOR PLAN 1
57 y/o female with right radius fracture  scheduled surgery: ORIF of distal radius fracture and right open carpal tunnel release  will obtain medical, cardiology clearance  pre-op instructions provided  Instructions provided on medications to continue and to take the day morning of surgery scheduled for right ulnar nerve release, the cubital tunnel ,right carpal tunnel release , right wrist removal of hardware , right thumb, ring finger trigger release on 5/22?25  will obtain medical clearance   pre op instructions on wash and medications   patient was on zepbound for weight loss . stopped the medication on 5/1/25

## 2025-05-12 ENCOUNTER — APPOINTMENT (OUTPATIENT)
Dept: INTERNAL MEDICINE | Facility: CLINIC | Age: 60
End: 2025-05-12
Payer: COMMERCIAL

## 2025-05-12 VITALS — OXYGEN SATURATION: 98 % | WEIGHT: 242 LBS | HEIGHT: 66 IN | BODY MASS INDEX: 38.89 KG/M2 | HEART RATE: 98 BPM

## 2025-05-12 VITALS — DIASTOLIC BLOOD PRESSURE: 78 MMHG | SYSTOLIC BLOOD PRESSURE: 130 MMHG

## 2025-05-12 DIAGNOSIS — Z01.818 ENCOUNTER FOR OTHER PREPROCEDURAL EXAMINATION: ICD-10-CM

## 2025-05-12 PROCEDURE — 99214 OFFICE O/P EST MOD 30 MIN: CPT

## 2025-05-12 PROCEDURE — G2211 COMPLEX E/M VISIT ADD ON: CPT | Mod: NC

## 2025-05-13 ENCOUNTER — NON-APPOINTMENT (OUTPATIENT)
Age: 60
End: 2025-05-13

## 2025-05-14 ENCOUNTER — APPOINTMENT (OUTPATIENT)
Dept: UROLOGY | Facility: CLINIC | Age: 60
End: 2025-05-14
Payer: COMMERCIAL

## 2025-05-14 DIAGNOSIS — R10.9 UNSPECIFIED ABDOMINAL PAIN: ICD-10-CM

## 2025-05-14 PROBLEM — M65.311 TRIGGER THUMB, RIGHT THUMB: Chronic | Status: ACTIVE | Noted: 2025-05-06

## 2025-05-14 PROBLEM — T84.84XA PAIN DUE TO INTERNAL ORTHOPEDIC PROSTHETIC DEVICES, IMPLANTS AND GRAFTS, INITIAL ENCOUNTER: Chronic | Status: ACTIVE | Noted: 2025-05-06

## 2025-05-14 PROBLEM — G56.21 LESION OF ULNAR NERVE, RIGHT UPPER LIMB: Chronic | Status: ACTIVE | Noted: 2025-05-06

## 2025-05-14 PROBLEM — F17.200 NICOTINE DEPENDENCE, UNSPECIFIED, UNCOMPLICATED: Chronic | Status: ACTIVE | Noted: 2025-05-06

## 2025-05-14 PROBLEM — G56.01 CARPAL TUNNEL SYNDROME, RIGHT UPPER LIMB: Chronic | Status: ACTIVE | Noted: 2025-05-06

## 2025-05-14 PROCEDURE — 99203 OFFICE O/P NEW LOW 30 MIN: CPT

## 2025-05-15 ENCOUNTER — APPOINTMENT (OUTPATIENT)
Dept: CARDIOLOGY | Facility: CLINIC | Age: 60
End: 2025-05-15
Payer: COMMERCIAL

## 2025-05-15 ENCOUNTER — NON-APPOINTMENT (OUTPATIENT)
Age: 60
End: 2025-05-15

## 2025-05-15 VITALS
HEART RATE: 74 BPM | OXYGEN SATURATION: 99 % | SYSTOLIC BLOOD PRESSURE: 130 MMHG | DIASTOLIC BLOOD PRESSURE: 80 MMHG | BODY MASS INDEX: 39.05 KG/M2 | RESPIRATION RATE: 16 BRPM | HEIGHT: 66 IN | WEIGHT: 243 LBS

## 2025-05-15 DIAGNOSIS — F17.210 NICOTINE DEPENDENCE, CIGARETTES, UNCOMPLICATED: ICD-10-CM

## 2025-05-15 DIAGNOSIS — I10 ESSENTIAL (PRIMARY) HYPERTENSION: ICD-10-CM

## 2025-05-15 DIAGNOSIS — I45.10 UNSPECIFIED RIGHT BUNDLE-BRANCH BLOCK: ICD-10-CM

## 2025-05-15 PROCEDURE — 99407 BEHAV CHNG SMOKING > 10 MIN: CPT

## 2025-05-15 PROCEDURE — 93000 ELECTROCARDIOGRAM COMPLETE: CPT

## 2025-05-15 PROCEDURE — 99214 OFFICE O/P EST MOD 30 MIN: CPT | Mod: 25

## 2025-05-19 RX ORDER — HYALURONATE SODIUM 20 MG/2 ML
20 SYRINGE (ML) INTRAARTICULAR
Qty: 6 | Refills: 0 | Status: ACTIVE | COMMUNITY
Start: 2025-05-13

## 2025-05-22 ENCOUNTER — APPOINTMENT (OUTPATIENT)
Dept: ORTHOPEDIC SURGERY | Facility: HOSPITAL | Age: 60
End: 2025-05-22

## 2025-05-22 ENCOUNTER — TRANSCRIPTION ENCOUNTER (OUTPATIENT)
Age: 60
End: 2025-05-22

## 2025-05-23 PROBLEM — N20.0 CALCULUS OF KIDNEY: Chronic | Status: ACTIVE | Noted: 2025-05-22

## 2025-06-22 NOTE — REVIEW OF SYSTEMS
Gave ativan before lab draw and bath to help pt tolerate and not fight w/staff;however, pt still hollered at lab and tried to hit/kick,also tried swinging and kicking while bathing pt,pt still confused,uncooperative. After bath and no longer stimulating pt he relaxed and went back to sleep.   [Fever] : no fever [Chills] : no chills [Chest Pain] : no chest pain [Palpitations] : no palpitations [Lower Ext Edema] : no lower extremity edema [Shortness Of Breath] : no shortness of breath [Wheezing] : no wheezing [Cough] : no cough [Abdominal Pain] : no abdominal pain

## (undated) DEVICE — DRSG WEBRIL 3"

## (undated) DEVICE — VENODYNE/SCD SLEEVE CALF MEDIUM

## (undated) DEVICE — SUT POLYSORB 2-0 36" GS-21 UNDYED

## (undated) DEVICE — SPECIMEN CONTAINER PET

## (undated) DEVICE — DRAPE TOWEL BLUE 17" X 24"

## (undated) DEVICE — GLV 7 PROTEXIS (WHITE)

## (undated) DEVICE — SUT MONOCRYL 5-0 18" P-3 UNDYED

## (undated) DEVICE — SUT MONOSOF 5-0 18" P-12

## (undated) DEVICE — DRAPE C ARM MINI

## (undated) DEVICE — POSITIONER BOOT CRADLE

## (undated) DEVICE — LAP PAD W RING 18 X 18"

## (undated) DEVICE — CATH IV SAFE INSYTE 24G X 3/4" (YELLOW)

## (undated) DEVICE — DRSG XEROFORM 5 X 9"

## (undated) DEVICE — PREP BETADINE KIT

## (undated) DEVICE — POSITIONER FOAM HEAD CRADLE (PINK)

## (undated) DEVICE — WARMING BLANKET LOWER ADULT

## (undated) DEVICE — DRSG KLING 4"

## (undated) DEVICE — DRSG MASTISOL

## (undated) DEVICE — SOL IRR POUR NS 0.9% 500ML

## (undated) DEVICE — SOL INJ LR 500ML

## (undated) DEVICE — GLV 7.5 ULTRAFREE MAX

## (undated) DEVICE — DRAPE 3/4 SHEET 52X76"

## (undated) DEVICE — SUT VICRYL 3-0 27" RB-1 UNDYED

## (undated) DEVICE — PACK UPPER EXTREMITY

## (undated) DEVICE — NDL HYPO REGULAR BEVEL 25G X 1.5" (BLUE)

## (undated) DEVICE — TOURNIQUET ESMARK 4"

## (undated) DEVICE — DRSG XEROFORM 1 X 8"

## (undated) DEVICE — DRSG ACE BANDAGE 4"

## (undated) DEVICE — POSITIONER STRAP ARMBOARD VELCRO TS-30

## (undated) DEVICE — SOL IRR POUR H2O 1500ML

## (undated) DEVICE — TOURNIQUET CUFF 18" DUAL PORT SINGLE BLADDER W PLC  (BLACK)

## (undated) DEVICE — DRAPE SURGICAL #1010

## (undated) DEVICE — WARMING BLANKET FULL ADULT

## (undated) DEVICE — DRILL BIT MEDARTIS TWIST 2X40X91MM

## (undated) DEVICE — GLV 7.5 PROTEXIS (BLUE)

## (undated) DEVICE — DRSG CURITY GAUZE SPONGE 4 X 4" 12-PLY

## (undated) DEVICE — PACK IV START WITH CHG

## (undated) DEVICE — NDL SPINAL 22G X 3.5" QUINCKE

## (undated) DEVICE — SLING SHOULDER IMMOBILIZER CLINIC LARGE

## (undated) DEVICE — BLADE SCREWDRIVER 2.5/2.8MM HD7

## (undated) DEVICE — TRAY EPIDURAL SINGLE DOSE

## (undated) DEVICE — CATH IV SAFE BC 22G X 1" (BLUE)

## (undated) DEVICE — SUT MONOCRYL 4-0 27" PS-2 UNDYED

## (undated) DEVICE — DRSG STERISTRIPS 0.5 X 4"